# Patient Record
Sex: MALE | Race: WHITE | NOT HISPANIC OR LATINO | Employment: OTHER | URBAN - METROPOLITAN AREA
[De-identification: names, ages, dates, MRNs, and addresses within clinical notes are randomized per-mention and may not be internally consistent; named-entity substitution may affect disease eponyms.]

---

## 2023-02-27 LAB
ANION GAP IN SER/PLAS: 14 MMOL/L (ref 10–20)
CALCIUM (MG/DL) IN SER/PLAS: 9.9 MG/DL (ref 8.6–10.3)
CARBON DIOXIDE, TOTAL (MMOL/L) IN SER/PLAS: 28 MMOL/L (ref 21–32)
CHLORIDE (MMOL/L) IN SER/PLAS: 104 MMOL/L (ref 98–107)
CREATININE (MG/DL) IN SER/PLAS: 0.92 MG/DL (ref 0.5–1.3)
ERYTHROCYTE DISTRIBUTION WIDTH (RATIO) BY AUTOMATED COUNT: 11.9 % (ref 11.5–14.5)
ERYTHROCYTE MEAN CORPUSCULAR HEMOGLOBIN CONCENTRATION (G/DL) BY AUTOMATED: 32.7 G/DL (ref 32–36)
ERYTHROCYTE MEAN CORPUSCULAR VOLUME (FL) BY AUTOMATED COUNT: 91 FL (ref 80–100)
ERYTHROCYTES (10*6/UL) IN BLOOD BY AUTOMATED COUNT: 4.66 X10E12/L (ref 4.5–5.9)
GFR MALE: >90 ML/MIN/1.73M2
GLUCOSE (MG/DL) IN SER/PLAS: 93 MG/DL (ref 74–99)
HEMATOCRIT (%) IN BLOOD BY AUTOMATED COUNT: 42.5 % (ref 41–52)
HEMOGLOBIN (G/DL) IN BLOOD: 13.9 G/DL (ref 13.5–17.5)
LEUKOCYTES (10*3/UL) IN BLOOD BY AUTOMATED COUNT: 7.8 X10E9/L (ref 4.4–11.3)
PLATELETS (10*3/UL) IN BLOOD AUTOMATED COUNT: 214 X10E9/L (ref 150–450)
POTASSIUM (MMOL/L) IN SER/PLAS: 4.5 MMOL/L (ref 3.5–5.3)
SODIUM (MMOL/L) IN SER/PLAS: 141 MMOL/L (ref 136–145)
UREA NITROGEN (MG/DL) IN SER/PLAS: 17 MG/DL (ref 6–23)

## 2023-04-11 ENCOUNTER — TELEPHONE (OUTPATIENT)
Dept: PRIMARY CARE | Facility: CLINIC | Age: 68
End: 2023-04-11
Payer: MEDICARE

## 2023-05-02 LAB
ALBUMIN (G/DL) IN SER/PLAS: 4.7 G/DL (ref 3.4–5)
ALBUMIN (MG/L) IN URINE: 12.1 MG/L
ALBUMIN/CREATININE (UG/MG) IN URINE: 7.3 UG/MG CRT (ref 0–30)
ANION GAP IN SER/PLAS: 12 MMOL/L (ref 10–20)
CALCIUM (MG/DL) IN SER/PLAS: 9.8 MG/DL (ref 8.6–10.3)
CARBON DIOXIDE, TOTAL (MMOL/L) IN SER/PLAS: 29 MMOL/L (ref 21–32)
CHLORIDE (MMOL/L) IN SER/PLAS: 104 MMOL/L (ref 98–107)
CREATININE (MG/DL) IN SER/PLAS: 0.96 MG/DL (ref 0.5–1.3)
CREATININE (MG/DL) IN URINE: 165 MG/DL (ref 20–370)
ERYTHROCYTE DISTRIBUTION WIDTH (RATIO) BY AUTOMATED COUNT: 11.9 % (ref 11.5–14.5)
ERYTHROCYTE MEAN CORPUSCULAR HEMOGLOBIN CONCENTRATION (G/DL) BY AUTOMATED: 32.9 G/DL (ref 32–36)
ERYTHROCYTE MEAN CORPUSCULAR VOLUME (FL) BY AUTOMATED COUNT: 92 FL (ref 80–100)
ERYTHROCYTES (10*6/UL) IN BLOOD BY AUTOMATED COUNT: 4.56 X10E12/L (ref 4.5–5.9)
GFR MALE: 87 ML/MIN/1.73M2
GLUCOSE (MG/DL) IN SER/PLAS: 81 MG/DL (ref 74–99)
HEMATOCRIT (%) IN BLOOD BY AUTOMATED COUNT: 42 % (ref 41–52)
HEMOGLOBIN (G/DL) IN BLOOD: 13.8 G/DL (ref 13.5–17.5)
LEUKOCYTES (10*3/UL) IN BLOOD BY AUTOMATED COUNT: 7 X10E9/L (ref 4.4–11.3)
PHOSPHATE (MG/DL) IN SER/PLAS: 3.5 MG/DL (ref 2.5–4.9)
PLATELETS (10*3/UL) IN BLOOD AUTOMATED COUNT: 192 X10E9/L (ref 150–450)
POTASSIUM (MMOL/L) IN SER/PLAS: 4.4 MMOL/L (ref 3.5–5.3)
SODIUM (MMOL/L) IN SER/PLAS: 141 MMOL/L (ref 136–145)
UREA NITROGEN (MG/DL) IN SER/PLAS: 18 MG/DL (ref 6–23)

## 2023-10-18 ENCOUNTER — HOSPITAL ENCOUNTER (OUTPATIENT)
Dept: RADIOLOGY | Facility: HOSPITAL | Age: 68
Discharge: HOME | End: 2023-10-18
Payer: MEDICARE

## 2023-10-18 DIAGNOSIS — N28.1 CYST OF KIDNEY, ACQUIRED: ICD-10-CM

## 2023-10-18 PROCEDURE — 76770 US EXAM ABDO BACK WALL COMP: CPT

## 2023-10-18 PROCEDURE — 76770 US EXAM ABDO BACK WALL COMP: CPT | Performed by: RADIOLOGY

## 2023-10-25 ENCOUNTER — LAB (OUTPATIENT)
Dept: LAB | Facility: LAB | Age: 68
End: 2023-10-25
Payer: MEDICARE

## 2023-10-25 ENCOUNTER — OFFICE VISIT (OUTPATIENT)
Dept: PRIMARY CARE | Facility: CLINIC | Age: 68
End: 2023-10-25
Payer: MEDICARE

## 2023-10-25 VITALS
WEIGHT: 206 LBS | RESPIRATION RATE: 17 BRPM | HEART RATE: 67 BPM | SYSTOLIC BLOOD PRESSURE: 122 MMHG | BODY MASS INDEX: 33.11 KG/M2 | TEMPERATURE: 97.8 F | DIASTOLIC BLOOD PRESSURE: 82 MMHG | HEIGHT: 66 IN

## 2023-10-25 DIAGNOSIS — Z23 NEED FOR VACCINATION: ICD-10-CM

## 2023-10-25 DIAGNOSIS — Z00.00 MEDICARE ANNUAL WELLNESS VISIT, SUBSEQUENT: ICD-10-CM

## 2023-10-25 DIAGNOSIS — I25.10 CORONARY ARTERY DISEASE INVOLVING NATIVE CORONARY ARTERY OF NATIVE HEART, UNSPECIFIED WHETHER ANGINA PRESENT: ICD-10-CM

## 2023-10-25 DIAGNOSIS — N40.1 BPH ASSOCIATED WITH NOCTURIA: ICD-10-CM

## 2023-10-25 DIAGNOSIS — G47.33 OBSTRUCTIVE SLEEP APNEA: ICD-10-CM

## 2023-10-25 DIAGNOSIS — K21.9 GERD WITHOUT ESOPHAGITIS: ICD-10-CM

## 2023-10-25 DIAGNOSIS — R35.1 BPH ASSOCIATED WITH NOCTURIA: ICD-10-CM

## 2023-10-25 DIAGNOSIS — L40.9 PSORIASIS: ICD-10-CM

## 2023-10-25 DIAGNOSIS — R53.83 FATIGUE, UNSPECIFIED TYPE: ICD-10-CM

## 2023-10-25 DIAGNOSIS — Z00.00 MEDICARE ANNUAL WELLNESS VISIT, SUBSEQUENT: Primary | ICD-10-CM

## 2023-10-25 DIAGNOSIS — R73.9 HYPERGLYCEMIA: ICD-10-CM

## 2023-10-25 PROBLEM — L82.1 OTHER SEBORRHEIC KERATOSIS: Status: ACTIVE | Noted: 2022-03-22

## 2023-10-25 PROBLEM — L57.0 ACTINIC KERATOSIS: Status: ACTIVE | Noted: 2023-10-25

## 2023-10-25 PROBLEM — E66.9 OBESITY, CLASS I, BMI 30-34.9: Status: ACTIVE | Noted: 2018-06-01

## 2023-10-25 PROBLEM — R09.89 CHRONIC THROAT CLEARING: Status: ACTIVE | Noted: 2023-10-25

## 2023-10-25 PROBLEM — R93.1 ABNORMAL COMPUTED TOMOGRAPHY ANGIOGRAPHY OF HEART: Status: ACTIVE | Noted: 2023-10-25

## 2023-10-25 PROBLEM — J31.0 CHRONIC RHINITIS: Status: ACTIVE | Noted: 2023-10-25

## 2023-10-25 PROBLEM — R07.9 CHEST PAIN: Status: ACTIVE | Noted: 2023-10-25

## 2023-10-25 PROBLEM — L57.3 POIKILODERMA OF CIVATTE: Status: ACTIVE | Noted: 2022-03-22

## 2023-10-25 PROBLEM — R25.1 OCCASIONAL TREMORS: Status: ACTIVE | Noted: 2023-10-25

## 2023-10-25 PROBLEM — E78.5 HYPERLIPIDEMIA: Status: ACTIVE | Noted: 2023-10-25

## 2023-10-25 PROBLEM — G44.009 CLUSTER HEADACHES: Status: ACTIVE | Noted: 2023-10-25

## 2023-10-25 PROBLEM — N45.1 EPIDIDYMITIS: Status: ACTIVE | Noted: 2023-10-25

## 2023-10-25 PROBLEM — L72.0 EPIDERMAL CYST: Status: ACTIVE | Noted: 2022-03-22

## 2023-10-25 PROBLEM — E66.811 OBESITY, CLASS I, BMI 30-34.9: Status: ACTIVE | Noted: 2018-06-01

## 2023-10-25 PROBLEM — D48.5 NEOPLASM OF UNCERTAIN BEHAVIOR OF SKIN: Status: ACTIVE | Noted: 2022-03-22

## 2023-10-25 PROBLEM — L72.3 INFECTED SEBACEOUS CYST: Status: ACTIVE | Noted: 2023-10-25

## 2023-10-25 PROBLEM — N28.1 RENAL CYST, RIGHT: Status: ACTIVE | Noted: 2023-10-25

## 2023-10-25 PROBLEM — R35.81 NOCTURNAL POLYURIA: Status: ACTIVE | Noted: 2023-10-25

## 2023-10-25 PROBLEM — K63.5 COLON POLYPS: Status: ACTIVE | Noted: 2023-10-25

## 2023-10-25 PROBLEM — D18.01 HEMANGIOMA OF SKIN AND SUBCUTANEOUS TISSUE: Status: ACTIVE | Noted: 2022-03-22

## 2023-10-25 PROBLEM — R21 RASH AND OTHER NONSPECIFIC SKIN ERUPTION: Status: ACTIVE | Noted: 2022-03-22

## 2023-10-25 PROBLEM — B07.8 OTHER VIRAL WARTS: Status: ACTIVE | Noted: 2022-03-22

## 2023-10-25 PROBLEM — R10.31 RIGHT GROIN PAIN: Status: ACTIVE | Noted: 2023-10-25

## 2023-10-25 PROBLEM — L91.8 OTHER HYPERTROPHIC DISORDERS OF THE SKIN: Status: ACTIVE | Noted: 2022-03-22

## 2023-10-25 PROBLEM — R35.0 URINARY FREQUENCY: Status: ACTIVE | Noted: 2023-10-25

## 2023-10-25 PROBLEM — L08.9 INFECTED SEBACEOUS CYST: Status: ACTIVE | Noted: 2023-10-25

## 2023-10-25 PROBLEM — I10 BENIGN ESSENTIAL HYPERTENSION: Status: ACTIVE | Noted: 2023-10-25

## 2023-10-25 PROBLEM — M19.90 ARTHRITIS: Status: ACTIVE | Noted: 2023-10-25

## 2023-10-25 LAB
ALBUMIN SERPL BCP-MCNC: 4.3 G/DL (ref 3.4–5)
ALP SERPL-CCNC: 58 U/L (ref 33–136)
ALT SERPL W P-5'-P-CCNC: 38 U/L (ref 10–52)
ANION GAP SERPL CALC-SCNC: 11 MMOL/L (ref 10–20)
AST SERPL W P-5'-P-CCNC: 24 U/L (ref 9–39)
BASOPHILS # BLD AUTO: 0.07 X10*3/UL (ref 0–0.1)
BASOPHILS NFR BLD AUTO: 1.2 %
BILIRUB SERPL-MCNC: 1.2 MG/DL (ref 0–1.2)
BUN SERPL-MCNC: 16 MG/DL (ref 6–23)
CALCIUM SERPL-MCNC: 9 MG/DL (ref 8.6–10.3)
CHLORIDE SERPL-SCNC: 106 MMOL/L (ref 98–107)
CHOLEST SERPL-MCNC: 106 MG/DL (ref 0–199)
CHOLESTEROL/HDL RATIO: 2.7
CO2 SERPL-SCNC: 30 MMOL/L (ref 21–32)
CREAT SERPL-MCNC: 0.88 MG/DL (ref 0.5–1.3)
EOSINOPHIL # BLD AUTO: 0.24 X10*3/UL (ref 0–0.7)
EOSINOPHIL NFR BLD AUTO: 4.3 %
ERYTHROCYTE [DISTWIDTH] IN BLOOD BY AUTOMATED COUNT: 11.9 % (ref 11.5–14.5)
EST. AVERAGE GLUCOSE BLD GHB EST-MCNC: 103 MG/DL
GFR SERPL CREATININE-BSD FRML MDRD: >90 ML/MIN/1.73M*2
GLUCOSE SERPL-MCNC: 100 MG/DL (ref 74–99)
HBA1C MFR BLD: 5.2 %
HCT VFR BLD AUTO: 43.9 % (ref 41–52)
HDLC SERPL-MCNC: 40 MG/DL
HGB BLD-MCNC: 14.1 G/DL (ref 13.5–17.5)
IMM GRANULOCYTES # BLD AUTO: 0.02 X10*3/UL (ref 0–0.7)
IMM GRANULOCYTES NFR BLD AUTO: 0.4 % (ref 0–0.9)
LDLC SERPL CALC-MCNC: 55 MG/DL
LYMPHOCYTES # BLD AUTO: 1.3 X10*3/UL (ref 1.2–4.8)
LYMPHOCYTES NFR BLD AUTO: 23.2 %
MCH RBC QN AUTO: 29.4 PG (ref 26–34)
MCHC RBC AUTO-ENTMCNC: 32.1 G/DL (ref 32–36)
MCV RBC AUTO: 92 FL (ref 80–100)
MONOCYTES # BLD AUTO: 0.41 X10*3/UL (ref 0.1–1)
MONOCYTES NFR BLD AUTO: 7.3 %
NEUTROPHILS # BLD AUTO: 3.57 X10*3/UL (ref 1.2–7.7)
NEUTROPHILS NFR BLD AUTO: 63.6 %
NON HDL CHOLESTEROL: 66 MG/DL (ref 0–149)
NRBC BLD-RTO: 0 /100 WBCS (ref 0–0)
PLATELET # BLD AUTO: 179 X10*3/UL (ref 150–450)
PMV BLD AUTO: 9.6 FL (ref 7.5–11.5)
POTASSIUM SERPL-SCNC: 4.5 MMOL/L (ref 3.5–5.3)
PROT SERPL-MCNC: 6.4 G/DL (ref 6.4–8.2)
PSA SERPL-MCNC: 0.61 NG/ML
RBC # BLD AUTO: 4.79 X10*6/UL (ref 4.5–5.9)
SODIUM SERPL-SCNC: 142 MMOL/L (ref 136–145)
TRIGL SERPL-MCNC: 56 MG/DL (ref 0–149)
TSH SERPL-ACNC: 2.16 MIU/L (ref 0.44–3.98)
VLDL: 11 MG/DL (ref 0–40)
WBC # BLD AUTO: 5.6 X10*3/UL (ref 4.4–11.3)

## 2023-10-25 PROCEDURE — 84153 ASSAY OF PSA TOTAL: CPT

## 2023-10-25 PROCEDURE — 36415 COLL VENOUS BLD VENIPUNCTURE: CPT

## 2023-10-25 PROCEDURE — 83036 HEMOGLOBIN GLYCOSYLATED A1C: CPT

## 2023-10-25 PROCEDURE — 84443 ASSAY THYROID STIM HORMONE: CPT

## 2023-10-25 PROCEDURE — 80061 LIPID PANEL: CPT

## 2023-10-25 PROCEDURE — G0439 PPPS, SUBSEQ VISIT: HCPCS | Performed by: FAMILY MEDICINE

## 2023-10-25 PROCEDURE — 85025 COMPLETE CBC W/AUTO DIFF WBC: CPT

## 2023-10-25 PROCEDURE — G0009 ADMIN PNEUMOCOCCAL VACCINE: HCPCS | Performed by: FAMILY MEDICINE

## 2023-10-25 PROCEDURE — 90677 PCV20 VACCINE IM: CPT | Performed by: FAMILY MEDICINE

## 2023-10-25 PROCEDURE — 80053 COMPREHEN METABOLIC PANEL: CPT

## 2023-10-25 RX ORDER — ISOSORBIDE MONONITRATE 30 MG/1
30 TABLET, EXTENDED RELEASE ORAL EVERY 24 HOURS
Qty: 90 TABLET | Refills: 3 | Status: SHIPPED | OUTPATIENT
Start: 2023-10-25

## 2023-10-25 RX ORDER — AMLODIPINE BESYLATE 5 MG/1
5 TABLET ORAL DAILY
Qty: 90 TABLET | Refills: 3 | Status: SHIPPED | OUTPATIENT
Start: 2023-10-25

## 2023-10-25 RX ORDER — AMLODIPINE BESYLATE 5 MG/1
5 TABLET ORAL DAILY
COMMUNITY
Start: 2023-02-01 | End: 2023-10-25 | Stop reason: SDUPTHER

## 2023-10-25 RX ORDER — MOMETASONE FUROATE 1 MG/G
OINTMENT TOPICAL DAILY
Qty: 45 G | Refills: 2 | Status: SHIPPED | OUTPATIENT
Start: 2023-10-25 | End: 2024-10-24

## 2023-10-25 RX ORDER — ISOSORBIDE MONONITRATE 30 MG/1
TABLET, EXTENDED RELEASE ORAL EVERY 24 HOURS
COMMUNITY
Start: 2023-02-27 | End: 2023-10-25 | Stop reason: SDUPTHER

## 2023-10-25 RX ORDER — ATORVASTATIN CALCIUM 40 MG/1
TABLET, FILM COATED ORAL EVERY 24 HOURS
COMMUNITY
Start: 2023-02-01 | End: 2023-10-25 | Stop reason: SDUPTHER

## 2023-10-25 RX ORDER — NAPROXEN SODIUM 220 MG/1
1 TABLET, FILM COATED ORAL DAILY
COMMUNITY
Start: 2023-01-30 | End: 2023-10-25 | Stop reason: SDUPTHER

## 2023-10-25 RX ORDER — NITROGLYCERIN 0.4 MG/1
TABLET SUBLINGUAL
COMMUNITY
Start: 2023-01-30

## 2023-10-25 RX ORDER — ATORVASTATIN CALCIUM 40 MG/1
40 TABLET, FILM COATED ORAL DAILY
Qty: 90 TABLET | Refills: 3 | Status: SHIPPED | OUTPATIENT
Start: 2023-10-25

## 2023-10-25 RX ORDER — NAPROXEN SODIUM 220 MG/1
81 TABLET, FILM COATED ORAL DAILY
Qty: 90 TABLET | Refills: 3 | Status: SHIPPED | OUTPATIENT
Start: 2023-10-25

## 2023-10-25 ASSESSMENT — ACTIVITIES OF DAILY LIVING (ADL)
DRESSING: INDEPENDENT
TAKING_MEDICATION: INDEPENDENT
BATHING: INDEPENDENT
MANAGING_FINANCES: INDEPENDENT
DOING_HOUSEWORK: INDEPENDENT
GROCERY_SHOPPING: INDEPENDENT

## 2023-10-25 ASSESSMENT — ENCOUNTER SYMPTOMS
MUSCULOSKELETAL NEGATIVE: 1
ALLERGIC/IMMUNOLOGIC NEGATIVE: 1
ENDOCRINE NEGATIVE: 1
CONSTITUTIONAL NEGATIVE: 1
CARDIOVASCULAR NEGATIVE: 1
NEUROLOGICAL NEGATIVE: 1
PSYCHIATRIC NEGATIVE: 1
GASTROINTESTINAL NEGATIVE: 1
RESPIRATORY NEGATIVE: 1
HEMATOLOGIC/LYMPHATIC NEGATIVE: 1
EYES NEGATIVE: 1

## 2023-10-25 ASSESSMENT — PATIENT HEALTH QUESTIONNAIRE - PHQ9
1. LITTLE INTEREST OR PLEASURE IN DOING THINGS: NOT AT ALL
2. FEELING DOWN, DEPRESSED OR HOPELESS: NOT AT ALL
SUM OF ALL RESPONSES TO PHQ9 QUESTIONS 1 AND 2: 0

## 2023-10-25 NOTE — PROGRESS NOTES
"Subjective   Reason for Visit: Demetrio Malave is an 67 y.o. male here for a Medicare Wellness visit.     Past Medical, Surgical, and Family History reviewed and updated in chart.    Reviewed all medications by prescribing practitioner or clinical pharmacist (such as prescriptions, OTCs, herbal therapies and supplements) and documented in the medical record.    HPI    Patient Care Team:  Rizwan Norris DO as PCP - General  Darrell Durand DO as PCP - [facility] [place] Attributed Provider     Review of Systems   Constitutional: Negative.    HENT: Negative.     Eyes: Negative.    Respiratory: Negative.     Cardiovascular: Negative.    Gastrointestinal: Negative.    Endocrine: Negative.    Genitourinary: Negative.    Musculoskeletal: Negative.    Skin: Negative.    Allergic/Immunologic: Negative.    Neurological: Negative.    Hematological: Negative.    Psychiatric/Behavioral: Negative.         Objective   Vitals:  /82   Pulse 67   Temp 36.6 °C (97.8 °F)   Resp 17   Ht 1.676 m (5' 6\")   Wt 93.4 kg (206 lb)   BMI 33.25 kg/m²       Physical Exam  Vitals and nursing note reviewed.   Constitutional:       Appearance: Normal appearance.   HENT:      Head: Normocephalic and atraumatic.      Nose: Nose normal.      Mouth/Throat:      Pharynx: Oropharynx is clear.   Eyes:      Extraocular Movements: Extraocular movements intact.      Conjunctiva/sclera: Conjunctivae normal.      Pupils: Pupils are equal, round, and reactive to light.   Neck:      Vascular: No carotid bruit.   Cardiovascular:      Rate and Rhythm: Normal rate and regular rhythm.      Pulses: Normal pulses.      Heart sounds: Normal heart sounds.   Pulmonary:      Effort: Pulmonary effort is normal. No respiratory distress.      Breath sounds: Normal breath sounds. No wheezing, rhonchi or rales.   Abdominal:      General: Abdomen is flat. Bowel sounds are normal. There is no distension.      Palpations: Abdomen is soft.      Tenderness: There is no " abdominal tenderness.      Hernia: No hernia is present.   Musculoskeletal:         General: No swelling or tenderness. Normal range of motion.      Cervical back: Normal range of motion and neck supple. No tenderness.   Lymphadenopathy:      Cervical: No cervical adenopathy.   Skin:     General: Skin is warm and dry.      Capillary Refill: Capillary refill takes less than 2 seconds.   Neurological:      General: No focal deficit present.      Mental Status: He is alert and oriented to person, place, and time.      Cranial Nerves: No cranial nerve deficit.   Psychiatric:         Attention and Perception: Attention and perception normal.         Mood and Affect: Mood normal.         Behavior: Behavior normal.         Thought Content: Thought content normal.         Judgment: Judgment normal.         Assessment/Plan   Problem List Items Addressed This Visit       BPH associated with nocturia    Relevant Orders    CBC and Auto Differential (Completed)    Comprehensive Metabolic Panel    Hemoglobin A1C    Lipid Panel    Prostate Specific Antigen    TSH with reflex to Free T4 if abnormal    CAD (coronary artery disease)    Relevant Medications    amLODIPine (Norvasc) 5 mg tablet    isosorbide mononitrate ER (Imdur) 30 mg 24 hr tablet    nitroglycerin (Nitrostat) 0.4 mg SL tablet    Other Relevant Orders    CBC and Auto Differential (Completed)    Comprehensive Metabolic Panel    Hemoglobin A1C    Lipid Panel    Prostate Specific Antigen    TSH with reflex to Free T4 if abnormal    GERD without esophagitis    Relevant Orders    CBC and Auto Differential (Completed)    Comprehensive Metabolic Panel    Hemoglobin A1C    Lipid Panel    Prostate Specific Antigen    TSH with reflex to Free T4 if abnormal    Obstructive sleep apnea    Relevant Orders    CBC and Auto Differential (Completed)    Comprehensive Metabolic Panel    Hemoglobin A1C    Lipid Panel    Prostate Specific Antigen    TSH with reflex to Free T4 if abnormal      Other Visit Diagnoses       Medicare annual wellness visit, subsequent    -  Primary    Relevant Orders    CBC and Auto Differential (Completed)    Comprehensive Metabolic Panel    Hemoglobin A1C    Lipid Panel    Prostate Specific Antigen    TSH with reflex to Free T4 if abnormal    Need for vaccination        Relevant Orders    Pneumococcal conjugate vaccine, 20-valent (PREVNAR 20) (Completed)    CBC and Auto Differential (Completed)    Comprehensive Metabolic Panel    Hemoglobin A1C    Lipid Panel    Prostate Specific Antigen    TSH with reflex to Free T4 if abnormal    Hyperglycemia        Relevant Orders    Hemoglobin A1C    Fatigue, unspecified type        Relevant Orders    TSH with reflex to Free T4 if abnormal    Psoriasis        Relevant Medications    mometasone (Elocon) 0.1 % ointment

## 2024-01-02 ENCOUNTER — PATIENT MESSAGE (OUTPATIENT)
Dept: PRIMARY CARE | Facility: CLINIC | Age: 69
End: 2024-01-02
Payer: MEDICARE

## 2024-01-02 DIAGNOSIS — R25.1 TREMOR: Primary | ICD-10-CM

## 2024-01-02 NOTE — TELEPHONE ENCOUNTER
From: Demetrio Malave  To: Rizwan Norris,   Sent: 1/2/2024 12:15 PM EST  Subject: Tremors    Dr. Norris hope all is well. Happy New Year.    When I saw you in October we talked about my tremors. I think i should get things checked out further. When I try to clip my finger nails it is difficult to keep steady. Also when i am trying to cut vegetables up to cook.     I was wondering if you could recommend a Neurologist for me one that specializes in tremors. I really do not want to go out of the area. I don’t mind going to [facility] ([place]) or [place] or even [facility]. Or [place].  I really would like to stay in this area.    Thank You Senthil Malave

## 2024-02-02 ENCOUNTER — APPOINTMENT (OUTPATIENT)
Dept: RADIOLOGY | Facility: HOSPITAL | Age: 69
End: 2024-02-02
Payer: MEDICARE

## 2024-02-02 ENCOUNTER — HOSPITAL ENCOUNTER (EMERGENCY)
Facility: HOSPITAL | Age: 69
Discharge: HOME | End: 2024-02-02
Attending: EMERGENCY MEDICINE
Payer: MEDICARE

## 2024-02-02 ENCOUNTER — APPOINTMENT (OUTPATIENT)
Dept: CARDIOLOGY | Facility: HOSPITAL | Age: 69
End: 2024-02-02
Payer: MEDICARE

## 2024-02-02 VITALS
WEIGHT: 200 LBS | BODY MASS INDEX: 32.14 KG/M2 | OXYGEN SATURATION: 99 % | RESPIRATION RATE: 17 BRPM | HEART RATE: 71 BPM | TEMPERATURE: 97.7 F | DIASTOLIC BLOOD PRESSURE: 68 MMHG | SYSTOLIC BLOOD PRESSURE: 109 MMHG | HEIGHT: 66 IN

## 2024-02-02 DIAGNOSIS — N20.1 URETERAL STONE: Primary | ICD-10-CM

## 2024-02-02 LAB
ALBUMIN SERPL BCP-MCNC: 4.9 G/DL (ref 3.4–5)
ALP SERPL-CCNC: 68 U/L (ref 33–136)
ALT SERPL W P-5'-P-CCNC: 60 U/L (ref 10–52)
ANION GAP SERPL CALC-SCNC: 14 MMOL/L (ref 10–20)
APPEARANCE UR: CLEAR
AST SERPL W P-5'-P-CCNC: 36 U/L (ref 9–39)
ATRIAL RATE: 81 BPM
BASOPHILS # BLD AUTO: 0.08 X10*3/UL (ref 0–0.1)
BASOPHILS NFR BLD AUTO: 1.1 %
BILIRUB SERPL-MCNC: 1.7 MG/DL (ref 0–1.2)
BILIRUB UR STRIP.AUTO-MCNC: NEGATIVE MG/DL
BNP SERPL-MCNC: 25 PG/ML (ref 0–99)
BUN SERPL-MCNC: 14 MG/DL (ref 6–23)
CALCIUM SERPL-MCNC: 9.8 MG/DL (ref 8.6–10.3)
CARDIAC TROPONIN I PNL SERPL HS: 5 NG/L (ref 0–20)
CARDIAC TROPONIN I PNL SERPL HS: 5 NG/L (ref 0–20)
CHLORIDE SERPL-SCNC: 104 MMOL/L (ref 98–107)
CO2 SERPL-SCNC: 27 MMOL/L (ref 21–32)
COLOR UR: NORMAL
CREAT SERPL-MCNC: 0.87 MG/DL (ref 0.5–1.3)
EGFRCR SERPLBLD CKD-EPI 2021: >90 ML/MIN/1.73M*2
EOSINOPHIL # BLD AUTO: 0.27 X10*3/UL (ref 0–0.7)
EOSINOPHIL NFR BLD AUTO: 3.8 %
ERYTHROCYTE [DISTWIDTH] IN BLOOD BY AUTOMATED COUNT: 11.9 % (ref 11.5–14.5)
GLUCOSE SERPL-MCNC: 95 MG/DL (ref 74–99)
GLUCOSE UR STRIP.AUTO-MCNC: NEGATIVE MG/DL
HCT VFR BLD AUTO: 45.6 % (ref 41–52)
HGB BLD-MCNC: 15.4 G/DL (ref 13.5–17.5)
HOLD SPECIMEN: NORMAL
IMM GRANULOCYTES # BLD AUTO: 0.02 X10*3/UL (ref 0–0.7)
IMM GRANULOCYTES NFR BLD AUTO: 0.3 % (ref 0–0.9)
KETONES UR STRIP.AUTO-MCNC: NEGATIVE MG/DL
LEUKOCYTE ESTERASE UR QL STRIP.AUTO: NEGATIVE
LIPASE SERPL-CCNC: 20 U/L (ref 9–82)
LYMPHOCYTES # BLD AUTO: 1.42 X10*3/UL (ref 1.2–4.8)
LYMPHOCYTES NFR BLD AUTO: 20.2 %
MAGNESIUM SERPL-MCNC: 2.15 MG/DL (ref 1.6–2.4)
MCH RBC QN AUTO: 30 PG (ref 26–34)
MCHC RBC AUTO-ENTMCNC: 33.8 G/DL (ref 32–36)
MCV RBC AUTO: 89 FL (ref 80–100)
MONOCYTES # BLD AUTO: 0.51 X10*3/UL (ref 0.1–1)
MONOCYTES NFR BLD AUTO: 7.3 %
NEUTROPHILS # BLD AUTO: 4.73 X10*3/UL (ref 1.2–7.7)
NEUTROPHILS NFR BLD AUTO: 67.3 %
NITRITE UR QL STRIP.AUTO: NEGATIVE
NRBC BLD-RTO: 0 /100 WBCS (ref 0–0)
P AXIS: 50 DEGREES
P OFFSET: 179 MS
P ONSET: 124 MS
PH UR STRIP.AUTO: 7 [PH]
PLATELET # BLD AUTO: 199 X10*3/UL (ref 150–450)
POTASSIUM SERPL-SCNC: 3.9 MMOL/L (ref 3.5–5.3)
PR INTERVAL: 172 MS
PROT SERPL-MCNC: 7.3 G/DL (ref 6.4–8.2)
PROT UR STRIP.AUTO-MCNC: NEGATIVE MG/DL
Q ONSET: 210 MS
QRS COUNT: 13 BEATS
QRS DURATION: 94 MS
QT INTERVAL: 370 MS
QTC CALCULATION(BAZETT): 429 MS
QTC FREDERICIA: 408 MS
R AXIS: -52 DEGREES
RBC # BLD AUTO: 5.13 X10*6/UL (ref 4.5–5.9)
RBC # UR STRIP.AUTO: NEGATIVE /UL
SODIUM SERPL-SCNC: 141 MMOL/L (ref 136–145)
SP GR UR STRIP.AUTO: 1.01
T AXIS: 44 DEGREES
T OFFSET: 395 MS
UROBILINOGEN UR STRIP.AUTO-MCNC: <2 MG/DL
VENTRICULAR RATE: 81 BPM
WBC # BLD AUTO: 7 X10*3/UL (ref 4.4–11.3)

## 2024-02-02 PROCEDURE — 83690 ASSAY OF LIPASE: CPT | Performed by: EMERGENCY MEDICINE

## 2024-02-02 PROCEDURE — 84484 ASSAY OF TROPONIN QUANT: CPT | Performed by: EMERGENCY MEDICINE

## 2024-02-02 PROCEDURE — 76870 US EXAM SCROTUM: CPT | Mod: FOREIGN READ | Performed by: RADIOLOGY

## 2024-02-02 PROCEDURE — 2500000004 HC RX 250 GENERAL PHARMACY W/ HCPCS (ALT 636 FOR OP/ED): Performed by: PHYSICIAN ASSISTANT

## 2024-02-02 PROCEDURE — 81003 URINALYSIS AUTO W/O SCOPE: CPT | Performed by: PHYSICIAN ASSISTANT

## 2024-02-02 PROCEDURE — 36415 COLL VENOUS BLD VENIPUNCTURE: CPT | Performed by: EMERGENCY MEDICINE

## 2024-02-02 PROCEDURE — 96375 TX/PRO/DX INJ NEW DRUG ADDON: CPT

## 2024-02-02 PROCEDURE — 84075 ASSAY ALKALINE PHOSPHATASE: CPT | Performed by: EMERGENCY MEDICINE

## 2024-02-02 PROCEDURE — 71045 X-RAY EXAM CHEST 1 VIEW: CPT | Mod: FR

## 2024-02-02 PROCEDURE — 83880 ASSAY OF NATRIURETIC PEPTIDE: CPT | Performed by: EMERGENCY MEDICINE

## 2024-02-02 PROCEDURE — 99285 EMERGENCY DEPT VISIT HI MDM: CPT | Performed by: EMERGENCY MEDICINE

## 2024-02-02 PROCEDURE — 74176 CT ABD & PELVIS W/O CONTRAST: CPT | Mod: FOREIGN READ | Performed by: RADIOLOGY

## 2024-02-02 PROCEDURE — 2500000001 HC RX 250 WO HCPCS SELF ADMINISTERED DRUGS (ALT 637 FOR MEDICARE OP): Performed by: PHYSICIAN ASSISTANT

## 2024-02-02 PROCEDURE — 83735 ASSAY OF MAGNESIUM: CPT | Performed by: EMERGENCY MEDICINE

## 2024-02-02 PROCEDURE — 96374 THER/PROPH/DIAG INJ IV PUSH: CPT

## 2024-02-02 PROCEDURE — 93005 ELECTROCARDIOGRAM TRACING: CPT

## 2024-02-02 PROCEDURE — 74176 CT ABD & PELVIS W/O CONTRAST: CPT

## 2024-02-02 PROCEDURE — 85025 COMPLETE CBC W/AUTO DIFF WBC: CPT | Performed by: EMERGENCY MEDICINE

## 2024-02-02 PROCEDURE — 76870 US EXAM SCROTUM: CPT

## 2024-02-02 PROCEDURE — 71045 X-RAY EXAM CHEST 1 VIEW: CPT | Mod: FOREIGN READ | Performed by: RADIOLOGY

## 2024-02-02 RX ORDER — NALOXONE HYDROCHLORIDE 4 MG/.1ML
4 SPRAY NASAL AS NEEDED
Qty: 2 EACH | Refills: 0 | Status: SHIPPED | OUTPATIENT
Start: 2024-02-02 | End: 2024-04-15 | Stop reason: WASHOUT

## 2024-02-02 RX ORDER — MORPHINE SULFATE 4 MG/ML
4 INJECTION, SOLUTION INTRAMUSCULAR; INTRAVENOUS ONCE
Status: COMPLETED | OUTPATIENT
Start: 2024-02-02 | End: 2024-02-02

## 2024-02-02 RX ORDER — OXYCODONE AND ACETAMINOPHEN 5; 325 MG/1; MG/1
1 TABLET ORAL EVERY 8 HOURS PRN
Qty: 10 TABLET | Refills: 0 | Status: SHIPPED | OUTPATIENT
Start: 2024-02-02 | End: 2024-02-05

## 2024-02-02 RX ORDER — ONDANSETRON HYDROCHLORIDE 2 MG/ML
4 INJECTION, SOLUTION INTRAVENOUS ONCE
Status: COMPLETED | OUTPATIENT
Start: 2024-02-02 | End: 2024-02-02

## 2024-02-02 RX ORDER — KETOROLAC TROMETHAMINE 10 MG/1
10 TABLET, FILM COATED ORAL EVERY 6 HOURS PRN
Qty: 20 TABLET | Refills: 0 | Status: SHIPPED | OUTPATIENT
Start: 2024-02-02 | End: 2024-02-07

## 2024-02-02 RX ORDER — ASPIRIN 325 MG
325 TABLET ORAL ONCE
Status: COMPLETED | OUTPATIENT
Start: 2024-02-02 | End: 2024-02-02

## 2024-02-02 RX ORDER — ONDANSETRON 4 MG/1
4 TABLET, ORALLY DISINTEGRATING ORAL EVERY 8 HOURS PRN
Qty: 15 TABLET | Refills: 0 | Status: SHIPPED | OUTPATIENT
Start: 2024-02-02 | End: 2024-02-07

## 2024-02-02 RX ORDER — TAMSULOSIN HYDROCHLORIDE 0.4 MG/1
0.4 CAPSULE ORAL DAILY
Qty: 7 CAPSULE | Refills: 0 | Status: SHIPPED | OUTPATIENT
Start: 2024-02-02 | End: 2024-02-09

## 2024-02-02 RX ADMIN — MORPHINE SULFATE 4 MG: 4 INJECTION, SOLUTION INTRAMUSCULAR; INTRAVENOUS at 11:48

## 2024-02-02 RX ADMIN — ONDANSETRON 4 MG: 2 INJECTION INTRAMUSCULAR; INTRAVENOUS at 11:46

## 2024-02-02 RX ADMIN — ASPIRIN 325 MG: 325 TABLET ORAL at 11:45

## 2024-02-02 ASSESSMENT — PAIN SCALES - GENERAL
PAINLEVEL_OUTOF10: 4
PAINLEVEL_OUTOF10: 0 - NO PAIN
PAINLEVEL_OUTOF10: 3
PAINLEVEL_OUTOF10: 6
PAINLEVEL_OUTOF10: 3

## 2024-02-02 ASSESSMENT — LIFESTYLE VARIABLES
HAVE PEOPLE ANNOYED YOU BY CRITICIZING YOUR DRINKING: NO
EVER FELT BAD OR GUILTY ABOUT YOUR DRINKING: NO
EVER HAD A DRINK FIRST THING IN THE MORNING TO STEADY YOUR NERVES TO GET RID OF A HANGOVER: NO
HAVE YOU EVER FELT YOU SHOULD CUT DOWN ON YOUR DRINKING: NO

## 2024-02-02 ASSESSMENT — PAIN - FUNCTIONAL ASSESSMENT
PAIN_FUNCTIONAL_ASSESSMENT: 0-10
PAIN_FUNCTIONAL_ASSESSMENT: 0-10

## 2024-02-02 ASSESSMENT — COLUMBIA-SUICIDE SEVERITY RATING SCALE - C-SSRS
2. HAVE YOU ACTUALLY HAD ANY THOUGHTS OF KILLING YOURSELF?: NO
1. IN THE PAST MONTH, HAVE YOU WISHED YOU WERE DEAD OR WISHED YOU COULD GO TO SLEEP AND NOT WAKE UP?: NO
6. HAVE YOU EVER DONE ANYTHING, STARTED TO DO ANYTHING, OR PREPARED TO DO ANYTHING TO END YOUR LIFE?: NO

## 2024-02-02 NOTE — ED PROVIDER NOTES
HPI   Chief Complaint   Patient presents with    Chest Pain     Pt c/o chest pain and right flank pain       A 68-year-old male patient with history of hypertension, hyperlipidemia comes into the emergency department today with complaints of right-sided flank pain that radiates down to his right testicle.  States this started couple days ago then he felt okay then started again yesterday evening.  States since that time he has developed left-sided chest pain and some tingling in the left arm.  States about a year ago he had a cardiac cath but he was not clear enough for stent.  Otherwise denies any other cardiac conditions.  Denies a history of kidney stones.  Denies any scrotal swelling, erythema.  For this purpose patient comes in the emergency department today for further evaluation.  He does describe urinary frequency, urgency and was up all night frequently having out of the bathroom.                          Lisa Coma Scale Score: 15                  Patient History   History reviewed. No pertinent past medical history.  Past Surgical History:   Procedure Laterality Date    CT ANGIO CORONARY ART WITH HEARTFLOW IF SCORE >30%  2/22/2023    CT HEART CORONARY ANGIOGRAM [facility]Y [id number] CT    HERNIA REPAIR  02/16/2017    Hernia Repair    OTHER SURGICAL HISTORY  12/17/2018    Shoulder surgery     No family history on file.  Social History     Tobacco Use    Smoking status: Former     Types: Cigarettes    Smokeless tobacco: Never   Substance Use Topics    Alcohol use: Not Currently    Drug use: Never       Physical Exam   ED Triage Vitals [02/02/24 1022]   Temperature Heart Rate Respirations BP   36.5 °C (97.7 °F) 81 16 146/87      Pulse Ox Temp src Heart Rate Source Patient Position   98 % -- Monitor --      BP Location FiO2 (%)     -- --       Physical Exam  Constitutional:       General: He is in acute distress (Moderate distress).      Appearance: Normal appearance. He is not ill-appearing or diaphoretic.   HENT:       Head: Normocephalic and atraumatic.      Nose: Nose normal.   Eyes:      Extraocular Movements: Extraocular movements intact.      Conjunctiva/sclera: Conjunctivae normal.      Pupils: Pupils are equal, round, and reactive to light.   Cardiovascular:      Rate and Rhythm: Normal rate and regular rhythm.   Pulmonary:      Effort: Pulmonary effort is normal. No respiratory distress.      Breath sounds: Normal breath sounds. No stridor. No wheezing.   Chest:      Chest wall: No deformity, tenderness or crepitus.   Abdominal:      Tenderness: There is abdominal tenderness (Tenderness palpation in the right side of the abdomen.). There is no guarding.   Genitourinary:     Comments: No scrotal swelling, edema, mild tenderness palpation of the right testicle, no inguinal hernia  Musculoskeletal:         General: Normal range of motion.      Cervical back: Normal range of motion.   Skin:     General: Skin is warm and dry.   Neurological:      General: No focal deficit present.      Mental Status: He is alert and oriented to person, place, and time. Mental status is at baseline.   Psychiatric:         Mood and Affect: Mood normal.         ED Course & MDM   Diagnoses as of 02/02/24 1326   Ureteral stone       Medical Decision Making  A 68-year-old male patient with history of hypertension, hyperlipidemia comes into the emergency department today with complaints of right-sided flank pain that radiates down to his right testicle.  States this started couple days ago then he felt okay then started again yesterday evening.  States since that time he has developed left-sided chest pain and some tingling in the left arm.  States about a year ago he had a cardiac cath but he was not clear enough for stent.  Otherwise denies any other cardiac conditions.  Denies a history of kidney stones.  Denies any scrotal swelling, erythema.  For this purpose patient comes in the emergency department today for further evaluation.  He does  describe urinary frequency, urgency and was up all night frequently having out of the bathroom.    EKG, chest x-ray, laboratory studies are ordered as well as ultrasound of the scrotum CT of the abdomen pelvis.  Rule ACS, arrhythmia, pneumothorax, pneumonia, acute kidney injury, UTI, pyelonephritis, kidney stone, testicular torsion or epididymitis.    Patient's urinalysis is negative, negative lipase negative BNP negative troponins negative EKG findings negative CMP other than a total bili of 1.7 ALT of 60.  Patient CBC is negative.  CT scan is consistent with a 2 mm stone at the left UVJ.  Patient's scrotal ultrasound is negative as well.  Will discharge patient home with follow-up with urology.  Patient agrees with this plan expressed full verbal understanding.  Questions answered.  Will send patient home p.o. pain medication, p.o. Flomax, p.o. Zofran.  Patient agrees this plan expressed full verbal understanding.  Questions answered.    Historians patient    Diagnosis: Ureteral stone        Labs Reviewed   COMPREHENSIVE METABOLIC PANEL - Abnormal       Result Value    Glucose 95      Sodium 141      Potassium 3.9      Chloride 104      Bicarbonate 27      Anion Gap 14      Urea Nitrogen 14      Creatinine 0.87      eGFR >90      Calcium 9.8      Albumin 4.9      Alkaline Phosphatase 68      Total Protein 7.3      AST 36      Bilirubin, Total 1.7 (*)     ALT 60 (*)    MAGNESIUM - Normal    Magnesium 2.15     B-TYPE NATRIURETIC PEPTIDE - Normal    BNP 25      Narrative:        <100 pg/mL - Heart failure unlikely  100-299 pg/mL - Intermediate probability of acute heart                  failure exacerbation. Correlate with clinical                  context and patient history.    >=300 pg/mL - Heart Failure likely. Correlate with clinical                  context and patient history.    BNP testing is performed using different testing methodology at [facility] than at other [facility]. Direct  result comparisons should only be made within the same method.      SERIAL TROPONIN-INITIAL - Normal    Troponin I, High Sensitivity 5      Narrative:     Less than 99th percentile of normal range cutoff-  Female and children under 18 years old <14 ng/L; Male <21 ng/L: Negative  Repeat testing should be performed if clinically indicated.     Female and children under 18 years old 14-50 ng/L; Male 21-50 ng/L:  Consistent with possible cardiac damage and possible increased clinical   risk. Serial measurements may help to assess extent of myocardial damage.     >50 ng/L: Consistent with cardiac damage, increased clinical risk and  myocardial infarction. Serial measurements may help assess extent of   myocardial damage.      NOTE: Children less than 1 year old may have higher baseline troponin   levels and results should be interpreted in conjunction with the overall   clinical context.     NOTE: Troponin I testing is performed using a different   testing methodology at [facility] than at other   [facility]. Direct result comparisons should only   be made within the same method.   URINALYSIS WITH REFLEX CULTURE AND MICROSCOPIC - Normal    Color, Urine Straw      Appearance, Urine Clear      Specific Gravity, Urine 1.009      pH, Urine 7.0      Protein, Urine NEGATIVE      Glucose, Urine NEGATIVE      Blood, Urine NEGATIVE      Ketones, Urine NEGATIVE      Bilirubin, Urine NEGATIVE      Urobilinogen, Urine <2.0      Nitrite, Urine NEGATIVE      Leukocyte Esterase, Urine NEGATIVE     SERIAL TROPONIN, 1 HOUR - Normal    Troponin I, High Sensitivity 5      Narrative:     Less than 99th percentile of normal range cutoff-  Female and children under 18 years old <14 ng/L; Male <21 ng/L: Negative  Repeat testing should be performed if clinically indicated.     Female and children under 18 years old 14-50 ng/L; Male 21-50 ng/L:  Consistent with possible cardiac damage and possible increased clinical   risk.  Serial measurements may help to assess extent of myocardial damage.     >50 ng/L: Consistent with cardiac damage, increased clinical risk and  myocardial infarction. Serial measurements may help assess extent of   myocardial damage.      NOTE: Children less than 1 year old may have higher baseline troponin   levels and results should be interpreted in conjunction with the overall   clinical context.     NOTE: Troponin I testing is performed using a different   testing methodology at [facility] than at other   [facility]. Direct result comparisons should only   be made within the same method.   LIPASE - Normal    Lipase 20      Narrative:     Venipuncture immediately after or during the administration of Metamizole may lead to falsely low results. Testing should be performed immediately prior to Metamizole dosing.   TROPONIN SERIES- (INITIAL, 1 HR)    Narrative:     The following orders were created for panel order Troponin I Series, High Sensitivity (0, 1 HR).  Procedure                               Abnormality         Status                     ---------                               -----------         ------                     Troponin I, High Sensiti...[[id number]]  Normal              Final result               Troponin, High Sensitivi...[[id number]]  Normal              Final result                 Please view results for these tests on the individual orders.   CBC WITH AUTO DIFFERENTIAL    WBC 7.0      nRBC 0.0      RBC 5.13      Hemoglobin 15.4      Hematocrit 45.6      MCV 89      MCH 30.0      MCHC 33.8      RDW 11.9      Platelets 199      Neutrophils % 67.3      Immature Granulocytes %, Automated 0.3      Lymphocytes % 20.2      Monocytes % 7.3      Eosinophils % 3.8      Basophils % 1.1      Neutrophils Absolute 4.73      Immature Granulocytes Absolute, Automated 0.02      Lymphocytes Absolute 1.42      Monocytes Absolute 0.51      Eosinophils Absolute 0.27      Basophils Absolute  0.08     GRAY TOP    Extra Tube Hold for add-ons.     URINALYSIS WITH REFLEX CULTURE AND MICROSCOPIC    Narrative:     The following orders were created for panel order Urinalysis with Reflex Culture and Microscopic.  Procedure                               Abnormality         Status                     ---------                               -----------         ------                     Urinalysis with Reflex C...[[id number]]  Normal              Final result               Extra Urine Gray Tube[[id number]]                            In process                   Please view results for these tests on the individual orders.   EXTRA URINE GRAY TUBE        CT abdomen pelvis wo IV contrast   Final Result   2 mm nonobstructing stone at the left UVJ. No other renal or ureteral   calculi.   Cholelithiasis.   Scattered diverticula on the colon without diverticulitis.   No free air or fluid.   Normal appendix.   Signed by Louis Amaya MD      US scrotum   Final Result   Hypoechoic areas within both testes of uncertain etiology.  No   evidence of testicular torsion or orchitis   Signed by Boo Rizzo MD      XR chest 1 view   Final Result   No acute process.   Signed by Louis Amaya MD            Shared TARA Attestation:    This patient was seen by the advanced practice provider.  I personally saw the patient and made/approved the management plan and take responsibility for the patient management.    History: 68-year-old male presents with right flank pain.    Exam: Regular rate and rhythm cardiac exam with clear breath sounds bilaterally.  Abdomen is soft with some mild tenderness to the right abdomen.  Neurological exam is grossly intact.  Negative Homans' sign bilaterally.    MDM: Renal colic, pyelonephritis, epididymitis, ACS, muscle strain    I have seen and examined the patient, agree with the workup, evaluation, medical decision making, management and diagnosis.  The care plan has been discussed.    Aravind GROSS  MD Pranay        Procedure  ECG 12 lead    Performed by: Kelvin Coburn PA-C  Authorized by: Aravind EVANS MD    Interpretation:     Details:  My EKG interpretation  Rate:     ECG rate:  81    ECG rate assessment: normal    Rhythm:     Rhythm: sinus rhythm    ST segments:     ST segments:  Normal  T waves:     T waves: normal         Kelvin Coburn PA-C  02/02/24 1322

## 2024-02-03 DIAGNOSIS — N20.1 CALCULUS OF URETEROVESICAL JUNCTION (UVJ): ICD-10-CM

## 2024-02-03 LAB
ATRIAL RATE: 58 BPM
P AXIS: 41 DEGREES
P OFFSET: 187 MS
P ONSET: 135 MS
PR INTERVAL: 168 MS
Q ONSET: 219 MS
QRS COUNT: 10 BEATS
QRS DURATION: 98 MS
QT INTERVAL: 414 MS
QTC CALCULATION(BAZETT): 406 MS
QTC FREDERICIA: 409 MS
R AXIS: -43 DEGREES
T AXIS: 20 DEGREES
T OFFSET: 426 MS
VENTRICULAR RATE: 58 BPM

## 2024-02-05 ENCOUNTER — LAB (OUTPATIENT)
Dept: LAB | Facility: LAB | Age: 69
End: 2024-02-05
Payer: MEDICARE

## 2024-02-05 DIAGNOSIS — N20.1 CALCULUS OF URETEROVESICAL JUNCTION (UVJ): ICD-10-CM

## 2024-02-05 PROCEDURE — 87086 URINE CULTURE/COLONY COUNT: CPT

## 2024-02-05 PROCEDURE — 88112 CYTOPATH CELL ENHANCE TECH: CPT | Performed by: PATHOLOGY

## 2024-02-05 PROCEDURE — 88112 CYTOPATH CELL ENHANCE TECH: CPT

## 2024-02-06 ENCOUNTER — HOSPITAL ENCOUNTER (OUTPATIENT)
Dept: RADIOLOGY | Facility: HOSPITAL | Age: 69
Discharge: HOME | End: 2024-02-06
Payer: MEDICARE

## 2024-02-06 DIAGNOSIS — N20.1 CALCULUS OF URETEROVESICAL JUNCTION (UVJ): ICD-10-CM

## 2024-02-06 LAB
BACTERIA UR CULT: NO GROWTH
LABORATORY COMMENT REPORT: NORMAL
LABORATORY COMMENT REPORT: NORMAL
PATH REPORT.FINAL DX SPEC: NORMAL
PATH REPORT.GROSS SPEC: NORMAL
PATH REPORT.RELEVANT HX SPEC: NORMAL
PATH REPORT.TOTAL CANCER: NORMAL

## 2024-02-06 PROCEDURE — 74178 CT ABD&PLV WO CNTR FLWD CNTR: CPT | Performed by: RADIOLOGY

## 2024-02-06 PROCEDURE — 76377 3D RENDER W/INTRP POSTPROCES: CPT

## 2024-02-06 PROCEDURE — 2550000001 HC RX 255 CONTRASTS

## 2024-02-06 PROCEDURE — 76377 3D RENDER W/INTRP POSTPROCES: CPT | Performed by: RADIOLOGY

## 2024-02-06 RX ADMIN — IOHEXOL 100 ML: 350 INJECTION, SOLUTION INTRAVENOUS at 13:52

## 2024-02-19 ENCOUNTER — OFFICE VISIT (OUTPATIENT)
Dept: UROLOGY | Facility: CLINIC | Age: 69
End: 2024-02-19
Payer: MEDICARE

## 2024-02-19 VITALS
WEIGHT: 214.07 LBS | HEART RATE: 68 BPM | SYSTOLIC BLOOD PRESSURE: 134 MMHG | BODY MASS INDEX: 34.4 KG/M2 | HEIGHT: 66 IN | DIASTOLIC BLOOD PRESSURE: 84 MMHG | RESPIRATION RATE: 16 BRPM

## 2024-02-19 DIAGNOSIS — N20.1 URETERAL CALCULUS: ICD-10-CM

## 2024-02-19 DIAGNOSIS — N40.1 BPH ASSOCIATED WITH NOCTURIA: Primary | ICD-10-CM

## 2024-02-19 DIAGNOSIS — R35.1 BPH ASSOCIATED WITH NOCTURIA: Primary | ICD-10-CM

## 2024-02-19 PROCEDURE — 1159F MED LIST DOCD IN RCRD: CPT | Performed by: UROLOGY

## 2024-02-19 PROCEDURE — 1160F RVW MEDS BY RX/DR IN RCRD: CPT | Performed by: UROLOGY

## 2024-02-19 PROCEDURE — 1126F AMNT PAIN NOTED NONE PRSNT: CPT | Performed by: UROLOGY

## 2024-02-19 PROCEDURE — 3075F SYST BP GE 130 - 139MM HG: CPT | Performed by: UROLOGY

## 2024-02-19 PROCEDURE — 3079F DIAST BP 80-89 MM HG: CPT | Performed by: UROLOGY

## 2024-02-19 PROCEDURE — 99204 OFFICE O/P NEW MOD 45 MIN: CPT | Performed by: UROLOGY

## 2024-02-19 PROCEDURE — 1036F TOBACCO NON-USER: CPT | Performed by: UROLOGY

## 2024-02-19 ASSESSMENT — PAIN SCALES - GENERAL: PAINLEVEL: 0-NO PAIN

## 2024-02-19 ASSESSMENT — ENCOUNTER SYMPTOMS
HEMATURIA: 0
FLANK PAIN: 1
DYSURIA: 0
DIFFICULTY URINATING: 1
FREQUENCY: 0

## 2024-02-19 NOTE — PROGRESS NOTES
68-year-old white male referred by [facility] emergency room, Dr. Aravind Pires..  Creatinine 0.87.  Urinalysis is negative for blood.  PSA 0.61.  CAT scan identifies a 2 mm left UVJ stone.  Patient is retired in 2013 from a [employer] company.  No family history of prostate or breast cancer.  The patient has never smoked cigarettes.    February 19, 2024, patient arrives with his wife of 49 years, Ольга.  He states that he has nocturia x 8, however this has been consistent for the past 30 years and is not interested in evaluation at this time.  CT urogram does not show any evidence of nephrolithiasis, hydronephrosis or ureteral calculi.  3.5 cm right simple renal cyst.  Urinalysis is negative for blood and urine cytology showed no malignant cells.  Urine culture no growth.  He will return in November.    PLAN:    1.  The patient will return November 2024 with a PSA and renal colic CAT scan    Physical Exam  Vitals and nursing note reviewed.   Constitutional:       Appearance: Normal appearance.   HENT:      Head: Normocephalic and atraumatic.   Pulmonary:      Effort: Pulmonary effort is normal.   Abdominal:      Palpations: Abdomen is soft.      Tenderness: There is no abdominal tenderness.   Musculoskeletal:         General: Normal range of motion.      Cervical back: Normal range of motion and neck supple.   Neurological:      General: No focal deficit present.      Mental Status: He is alert and oriented to person, place, and time.   Psychiatric:         Mood and Affect: Mood normal.         Behavior: Behavior normal.        This note was created with voice-recognition software and was not corrected for typographical or grammatical errors.

## 2024-02-19 NOTE — PATIENT INSTRUCTIONS
It was very nice to meet you and your wife Ольга.  We reviewed your recent CT urogram which does not show any stones in the urinary tract.  You do have a simple, benign-appearing cyst in the right kidney which we will continue to monitor.  No blood in the urine, and no urinary tract infections noted.  Also, no malignant cells in the urine.  You are not having any pain at this time.  He also do wake at night approximately 8 times to urinate.  However you state that this is routine for you over the past 30 years and you are not interested in further evaluation at this time.  I will see you again in November.     This note was created with voice-recognition software and was not corrected for typographical or grammatical errors.

## 2024-02-19 NOTE — PROGRESS NOTES
Patient present with wife, Ольга. Patient denies any pain today. Patient has not had any recent surgeries or hospital admits. Patient denies any concerns about falling or safety. Patient has concerns for nocturia x8, weak stream. Per patient Dr. Bedolla told him he needs to see urology. CV     Review of Systems   Genitourinary:  Positive for difficulty urinating, enuresis and flank pain. Negative for dysuria, frequency, hematuria, penile pain, penile swelling, scrotal swelling, testicular pain and urgency.

## 2024-02-19 NOTE — LETTER
February 19, 2024     Rizwan Norris DO  [address]  [address]  [facility] [address]    Patient: Demetrio Malave   YOB: 1955   Date of Visit: 2/19/2024       Dear Dr. Rizwan Norris DO:    Thank you for referring Demetrio Malave to me for evaluation. Below are my notes for this consultation.  If you have questions, please do not hesitate to call me. I look forward to following your patient along with you.       Sincerely,     Renny Rain MD      CC: No Recipients  ______________________________________________________________________________________    68-year-old white male referred by [facility] emergency room, Dr. Aravind Pires..  Creatinine 0.87.  Urinalysis is negative for blood.  PSA 0.61.  CAT scan identifies a 2 mm left UVJ stone.  Patient is retired in 2013 from a [employer] company.  No family history of prostate or breast cancer.  The patient has never smoked cigarettes.    February 19, 2024, patient arrives with his wife of 49 years, Ольга.  He states that he has nocturia x 8, however this has been consistent for the past 30 years and is not interested in evaluation at this time.  CT urogram does not show any evidence of nephrolithiasis, hydronephrosis or ureteral calculi.  3.5 cm right simple renal cyst.  Urinalysis is negative for blood and urine cytology showed no malignant cells.  Urine culture no growth.  He will return in November.    PLAN:    1.  The patient will return November 2024 with a PSA and renal colic CAT scan    Physical Exam  Vitals and nursing note reviewed.   Constitutional:       Appearance: Normal appearance.   HENT:      Head: Normocephalic and atraumatic.   Pulmonary:      Effort: Pulmonary effort is normal.   Abdominal:      Palpations: Abdomen is soft.      Tenderness: There is no abdominal tenderness.   Musculoskeletal:         General: Normal range of motion.      Cervical back: Normal range of motion and neck supple.   Neurological:       General: No focal deficit present.      Mental Status: He is alert and oriented to person, place, and time.   Psychiatric:         Mood and Affect: Mood normal.         Behavior: Behavior normal.        This note was created with voice-recognition software and was not corrected for typographical or grammatical errors.

## 2024-02-28 DIAGNOSIS — Z12.5 SCREENING PSA (PROSTATE SPECIFIC ANTIGEN): ICD-10-CM

## 2024-02-28 DIAGNOSIS — N28.1 RENAL CYST: ICD-10-CM

## 2024-03-14 ENCOUNTER — LAB (OUTPATIENT)
Dept: LAB | Facility: LAB | Age: 69
End: 2024-03-14
Payer: MEDICARE

## 2024-03-14 DIAGNOSIS — I10 ESSENTIAL (PRIMARY) HYPERTENSION: ICD-10-CM

## 2024-03-14 DIAGNOSIS — E78.5 HYPERLIPIDEMIA, UNSPECIFIED: Primary | ICD-10-CM

## 2024-03-14 LAB
ALBUMIN SERPL BCP-MCNC: 4.3 G/DL (ref 3.4–5)
ALP SERPL-CCNC: 58 U/L (ref 33–136)
ALT SERPL W P-5'-P-CCNC: 34 U/L (ref 10–52)
ANION GAP SERPL CALC-SCNC: 9 MMOL/L (ref 10–20)
AST SERPL W P-5'-P-CCNC: 27 U/L (ref 9–39)
BILIRUB SERPL-MCNC: 1.4 MG/DL (ref 0–1.2)
BUN SERPL-MCNC: 18 MG/DL (ref 6–23)
CALCIUM SERPL-MCNC: 8.9 MG/DL (ref 8.6–10.3)
CHLORIDE SERPL-SCNC: 105 MMOL/L (ref 98–107)
CHOLEST SERPL-MCNC: 89 MG/DL (ref 0–199)
CHOLESTEROL/HDL RATIO: 2.4
CO2 SERPL-SCNC: 30 MMOL/L (ref 21–32)
CREAT SERPL-MCNC: 0.87 MG/DL (ref 0.5–1.3)
EGFRCR SERPLBLD CKD-EPI 2021: >90 ML/MIN/1.73M*2
GLUCOSE SERPL-MCNC: 88 MG/DL (ref 74–99)
HDLC SERPL-MCNC: 36.9 MG/DL
LDLC SERPL CALC-MCNC: 40 MG/DL
NON HDL CHOLESTEROL: 52 MG/DL (ref 0–149)
POTASSIUM SERPL-SCNC: 4.2 MMOL/L (ref 3.5–5.3)
PROT SERPL-MCNC: 6.3 G/DL (ref 6.4–8.2)
SODIUM SERPL-SCNC: 140 MMOL/L (ref 136–145)
TRIGL SERPL-MCNC: 62 MG/DL (ref 0–149)
VLDL: 12 MG/DL (ref 0–40)

## 2024-03-14 PROCEDURE — 36415 COLL VENOUS BLD VENIPUNCTURE: CPT

## 2024-03-14 PROCEDURE — 80053 COMPREHEN METABOLIC PANEL: CPT

## 2024-03-14 PROCEDURE — 80061 LIPID PANEL: CPT

## 2024-03-20 ENCOUNTER — HOSPITAL ENCOUNTER (EMERGENCY)
Facility: HOSPITAL | Age: 69
Discharge: HOME | End: 2024-03-20
Payer: MEDICARE

## 2024-03-20 ENCOUNTER — APPOINTMENT (OUTPATIENT)
Dept: RADIOLOGY | Facility: HOSPITAL | Age: 69
End: 2024-03-20
Payer: MEDICARE

## 2024-03-20 VITALS
BODY MASS INDEX: 32.95 KG/M2 | OXYGEN SATURATION: 98 % | RESPIRATION RATE: 17 BRPM | HEART RATE: 71 BPM | HEIGHT: 66 IN | DIASTOLIC BLOOD PRESSURE: 81 MMHG | TEMPERATURE: 97.9 F | WEIGHT: 205 LBS | SYSTOLIC BLOOD PRESSURE: 157 MMHG

## 2024-03-20 DIAGNOSIS — M25.562 ACUTE PAIN OF LEFT KNEE: Primary | ICD-10-CM

## 2024-03-20 PROCEDURE — 73700 CT LOWER EXTREMITY W/O DYE: CPT | Mod: LEFT SIDE | Performed by: RADIOLOGY

## 2024-03-20 PROCEDURE — 73590 X-RAY EXAM OF LOWER LEG: CPT | Mod: LT

## 2024-03-20 PROCEDURE — 2500000001 HC RX 250 WO HCPCS SELF ADMINISTERED DRUGS (ALT 637 FOR MEDICARE OP): Performed by: PHYSICIAN ASSISTANT

## 2024-03-20 PROCEDURE — 73590 X-RAY EXAM OF LOWER LEG: CPT | Mod: LEFT SIDE | Performed by: RADIOLOGY

## 2024-03-20 PROCEDURE — 73564 X-RAY EXAM KNEE 4 OR MORE: CPT | Mod: LEFT SIDE | Performed by: RADIOLOGY

## 2024-03-20 PROCEDURE — 99284 EMERGENCY DEPT VISIT MOD MDM: CPT | Mod: 25

## 2024-03-20 PROCEDURE — 73564 X-RAY EXAM KNEE 4 OR MORE: CPT | Mod: LT

## 2024-03-20 PROCEDURE — 73700 CT LOWER EXTREMITY W/O DYE: CPT | Mod: LT

## 2024-03-20 RX ORDER — HYDROCODONE BITARTRATE AND ACETAMINOPHEN 5; 325 MG/1; MG/1
1 TABLET ORAL ONCE
Status: COMPLETED | OUTPATIENT
Start: 2024-03-20 | End: 2024-03-20

## 2024-03-20 RX ADMIN — HYDROCODONE BITARTRATE AND ACETAMINOPHEN 1 TABLET: 5; 325 TABLET ORAL at 01:01

## 2024-03-20 ASSESSMENT — PAIN DESCRIPTION - PAIN TYPE: TYPE: ACUTE PAIN

## 2024-03-20 ASSESSMENT — COLUMBIA-SUICIDE SEVERITY RATING SCALE - C-SSRS
2. HAVE YOU ACTUALLY HAD ANY THOUGHTS OF KILLING YOURSELF?: NO
6. HAVE YOU EVER DONE ANYTHING, STARTED TO DO ANYTHING, OR PREPARED TO DO ANYTHING TO END YOUR LIFE?: NO
1. IN THE PAST MONTH, HAVE YOU WISHED YOU WERE DEAD OR WISHED YOU COULD GO TO SLEEP AND NOT WAKE UP?: NO

## 2024-03-20 ASSESSMENT — PAIN DESCRIPTION - LOCATION: LOCATION: KNEE

## 2024-03-20 ASSESSMENT — PAIN - FUNCTIONAL ASSESSMENT: PAIN_FUNCTIONAL_ASSESSMENT: 0-10

## 2024-03-20 ASSESSMENT — PAIN DESCRIPTION - DESCRIPTORS
DESCRIPTORS: ACHING
DESCRIPTORS: ACHING

## 2024-03-20 ASSESSMENT — PAIN SCALES - GENERAL
PAINLEVEL_OUTOF10: 8
PAINLEVEL_OUTOF10: 9

## 2024-03-20 ASSESSMENT — PAIN DESCRIPTION - ORIENTATION: ORIENTATION: LEFT

## 2024-03-20 NOTE — ED PROVIDER NOTES
"HPI   Chief Complaint   Patient presents with    Knee Pain     Left  knee pain that started this aternnon. Pt denies any injury o trauma. States pain \" came out of nowhere\"       68-year-old male with a history of hypertension, hyperlipidemia presenting to the ED today with pain to his left knee that started around 7 or 8 PM this evening.  Patient tells me at the time of the pain starting he was not doing any strenuous activity but he normally does walk on a treadmill every day however his pain started afterwards.  He did not fall onto the knee or have any twisting injury.  He states his pain is on the outside portion of the knee anytime he puts weight on it or tries to fully extend it.  He is also having pain in the front of the knee more towards the top of his tibia.  He has not noticed any bruising or redness or swelling and he does not have numbness, weakness or paresthesias.  He does not have any pain in the back portion of the knee or into the calf.  No previous surgeries or injuries to this knee or history of gout.  No further complaints.      History provided by:  Patient                      [place] Coma Scale Score: 15                     Patient History   Past Medical History:   Diagnosis Date    Prostatitis      Past Surgical History:   Procedure Laterality Date    CT ANGIO CORONARY ART WITH HEARTFLOW IF SCORE >30%  2023    CT HEART CORONARY ANGIOGRAM ELY [id number] CT    HERNIA REPAIR  2017    Hernia Repair    OTHER SURGICAL HISTORY  2018    Shoulder surgery     Family History   Problem Relation Name Age of Onset    Leukemia Father      Colon cancer Father       Social History     Tobacco Use    Smoking status: Former     Types: Cigars     Quit date: 1980     Years since quittin.2    Smokeless tobacco: Never   Substance Use Topics    Alcohol use: Not Currently    Drug use: Never       Physical Exam   ED Triage Vitals [24 0013]   Temperature Heart Rate Respirations BP   36.6 °C " (97.9 °F) 71 17 157/81      Pulse Ox Temp Source Heart Rate Source Patient Position   98 % Tympanic Monitor Sitting      BP Location FiO2 (%)     Right arm --       Physical Exam  Constitutional:       General: He is not in acute distress.  Eyes:      Conjunctiva/sclera: Conjunctivae normal.   Cardiovascular:      Comments: No peripheral edema.  Distal pulses intact and symmetric.  Cap refill less than 2 seconds.  Musculoskeletal:      Comments: Tenderness to lateral aspect of left knee and to proximal tibia without edema, crepitus, erythema or ecchymosis.  No calf tenderness or swelling bilaterally.  No popliteal tenderness or palpable cord.  Decreased extension and flexion secondary to pain.  Negative anterior/posterior drawer sign.  No patellar instability or patellar tenderness. NVI   Skin:     General: Skin is warm.      Capillary Refill: Capillary refill takes less than 2 seconds.   Neurological:      Mental Status: He is alert and oriented to person, place, and time.       ED Course & MDM   Diagnoses as of 03/20/24 0307   Acute pain of left knee       Medical Decision Making  68-year-old male with a history of hypertension, hyperlipidemia presenting to the ED today with anterior and lateral left knee pain that started around 7 or 8 PM without known trauma or injury or twisting mechanism.  There is no swelling redness or bruising to the knee and he denies numbness weakness or paresthesia.  Pain is exacerbated by extension and ambulating on the knee.  No further complaints and he arrives afebrile with stable vital signs.  On my exam there is no obvious signs of trauma or infection in the knee.  He is tender over the proximal tibia and the lateral aspect of the knee but there is no patellar tenderness or instability, negative anterior/posterior drawer sign.  He does not have any edema throughout the leg and there is no calf tenderness or swelling.  He has good distal pulses and is neurovascularly intact.  X-rays  ordered as well as pain medication.    X-rays of the left knee and tib-fib today show no acute osseous abnormality.  There is trace meniscal calcifications but no other acute abnormalities.  After pain medication we did attempt to ambulate the patient but he is stating that it is too painful to walk on.  With the patient's tenderness, pain and  inability to put weight through the leg due to the pain we will obtain a CT for further evaluation.    CT of the left knee shows no acute displaced fracture or dislocation.  There is some prepatellar soft tissue swelling and osteoarthrosis but no other acute abnormality.  Patient states that after pain medication has kicked him out while his pain has resolved and he is able to put some pressure through the leg.  He states that when he twisted his lower leg medially he is feeling the pain on the lateral aspect of the knee.  I do feel this could be from possible strain versus his arthritis.  We will discharge him home I discussed RICE therapy as well as use of Tylenol for pain relief and follow-up with orthopedics.  He expressed understanding and agreed with this plan.    Labs Reviewed - No data to display    CT knee left wo IV contrast   Final Result   No acute displaced fracture or dislocation. Prepatellar soft tissue   swelling. Patellofemoral joint osteoarthrosis.   Punctate areas of increased density in both the lateral and medial   compartments which may be seen with chondrocalcinosis.        MACRO:   None.        Signed by: Evan Finkelstein 3/20/2024 2:42 AM   Dictation workstation:   [id number]      XR knee left 4+ views   Final Result   1. No acute osseous abnormality identified.   2. Trace meniscal calcifications suggesting calcium pyrophosphate   deposition.                  Signed by: Silvio Wiggins 3/20/2024 1:23 AM   Dictation workstation:   [id number]      XR tibia fibula left 2 views   Final Result   1. No acute osseous abnormality identified.   2. Trace  meniscal calcifications suggesting calcium pyrophosphate   deposition.                  Signed by: Silvio Wiggins 3/20/2024 1:23 AM   Dictation workstation:   [id number]            Procedure  Procedures     Stefania Paniagua PA-C  03/20/24 0313

## 2024-04-15 ENCOUNTER — OFFICE VISIT (OUTPATIENT)
Dept: NEUROLOGY | Facility: CLINIC | Age: 69
End: 2024-04-15
Payer: MEDICARE

## 2024-04-15 VITALS
HEART RATE: 63 BPM | HEIGHT: 66 IN | DIASTOLIC BLOOD PRESSURE: 72 MMHG | SYSTOLIC BLOOD PRESSURE: 115 MMHG | BODY MASS INDEX: 34.67 KG/M2 | WEIGHT: 215.7 LBS

## 2024-04-15 DIAGNOSIS — R25.1 TREMOR: ICD-10-CM

## 2024-04-15 PROCEDURE — 1036F TOBACCO NON-USER: CPT | Performed by: PSYCHIATRY & NEUROLOGY

## 2024-04-15 PROCEDURE — 99204 OFFICE O/P NEW MOD 45 MIN: CPT | Performed by: PSYCHIATRY & NEUROLOGY

## 2024-04-15 PROCEDURE — 3074F SYST BP LT 130 MM HG: CPT | Performed by: PSYCHIATRY & NEUROLOGY

## 2024-04-15 PROCEDURE — 1160F RVW MEDS BY RX/DR IN RCRD: CPT | Performed by: PSYCHIATRY & NEUROLOGY

## 2024-04-15 PROCEDURE — 3078F DIAST BP <80 MM HG: CPT | Performed by: PSYCHIATRY & NEUROLOGY

## 2024-04-15 PROCEDURE — 1159F MED LIST DOCD IN RCRD: CPT | Performed by: PSYCHIATRY & NEUROLOGY

## 2024-04-15 ASSESSMENT — FAHN TOLOSA MARTIN TREMOR (FTM)
DRAWING B LEFT SCORE: 0
RLE TOTAL SCORE: 0
DRAWING A LEFT SCORE: 0
HANDWRITING WITH DOMINANT HAND: 0
PART A SUBTOTAL SCORE: 3
RUE TOTAL SCORE: 1
UE ARM AT REST: 0
UE ARMS OUTSTRECHED WRISTS MILDLY EXTENDED FINGERS SPREAD APART: 0
DRAWING C RIGHT SCORE: 1
UE FINGER TO NOSE AND OTHER ACTIONS: 1
LE LEG FLEXED AT HIPS AND KNEES AT POSTURE: 0
UE ARM AT REST: 0
LE AT REST: 0
FACE IN REPOSE: 0
LE TOE TO FINGER IN A FLEXED POSTURE: 0
DRAWING B RGHT SCORE: 2
LE TOE TO FINGER IN A FLEXED POSTURE: 0
TOUNGE AT REST: 0
UE FINGER TO NOSE AND OTHER ACTIONS: 1
TRUNK TOTAL SCORE: 0
UE ARMS OUTSTRECHED WRISTS MILDLY EXTENDED FINGERS SPREAD APART: 1
HEAD IN REPOSE: 0
HEAD TOTAL SCORE: 0
RUE TOTAL SCORE: 2
TRUNK  WHEN SITTING OR STANDING: 0
LE AT REST: 0
HEAD AT POSTURE WHEN STANDING OR SITTING: 0
VOICE IN ACTION: 0
TOUNGE WHEN PROTUDED: 0
FACE TOTAL: 0
DRAWING B TOTAL SCORE: 2
FACE AT POSTURE WHEN STANDING OR SITTING: 0
TRUNK IN REPOSE: 0
VOICE TOTAL SCORE: 0
LLE TOTAL SCORE: 0
DRAWING C LEFT SCORE: 0
DRAWING A RIGHT SCORE: 1
LE LEG FLEXED AT HIPS AND KNEES AT POSTURE: 0
DRAWING C TOTAL SCORE: 1
DRAWING A TOTAL SCORE: 1
TOUNGE TOTAL SCORE: 0

## 2024-04-15 ASSESSMENT — UNIFIED PARKINSONS DISEASE RATING SCALE (UPDRS)
TOETAPPING_RIGHT: 0
TOETAPPING_LEFT: 1
AMPLITUDE_RUE: 0
FINGER_TAPPING_LEFT: 0
CONSTANCY_TREMOR_ATREST: 0
RIGIDITY_RLE: 0
FACIAL_EXPRESSION: 0
AMPLITUDE_RLE: 0
AMPLITUDE_LLE: 0
GAIT: 0
RIGIDITY_RUE: 0
LEG_AGILITY_LEFT: 0
RIGIDITY_LLE: 0
FINGER_TAPPING_RIGHT: 0
LEG_AGILITY_RIGHT: 0
LEVODOPA: NO
RIGIDITY_NECK: 0
PRONATION_SUPINATION_RIGHT: 0
RIGIDITY_LUE: 0
SPEECH: 1
POSTURE: 0
TOTAL_SCORE: 5
FREEZING_GAIT: 0
HANDMOVEMENTS_RIGHT: 0
PRONATION_SUPINATION_LEFT: 0
POSTURAL_STABILITY: 0
POSTURAL_TREMOR_RIGHTHAND: 1
SPONTANEITY_OF_MOVEMENT: 0
POSTURAL_TREMOR_LEFTHAND: 0
KINETIC_TREMOR_LEFTHAND: 1
KINETIC_TREMOR_RIGHTHAND: 1
AMPLITUDE_LUE: 0
AMPLITUDE_LIP_JAW: 0
CHAIR_RISING_SCALE: 0

## 2024-04-15 ASSESSMENT — PATIENT HEALTH QUESTIONNAIRE - PHQ9
1. LITTLE INTEREST OR PLEASURE IN DOING THINGS: NOT AT ALL
1. LITTLE INTEREST OR PLEASURE IN DOING THINGS: NOT AT ALL
2. FEELING DOWN, DEPRESSED OR HOPELESS: NOT AT ALL
2. FEELING DOWN, DEPRESSED OR HOPELESS: NOT AT ALL
SUM OF ALL RESPONSES TO PHQ9 QUESTIONS 1 & 2: 0
SUM OF ALL RESPONSES TO PHQ9 QUESTIONS 1 AND 2: 0

## 2024-04-15 ASSESSMENT — ENCOUNTER SYMPTOMS
OCCASIONAL FEELINGS OF UNSTEADINESS: 0
DEPRESSION: 0
LOSS OF SENSATION IN FEET: 0

## 2024-04-15 NOTE — PATIENT INSTRUCTIONS
It was nice to see you today.     You have a mild tremor, but otherwise the rest of the exam was normal.   You have a strong family hx of Parkinson's disease, but there were findings to suggest same at this time on your exam. I will watch you over time, if needed we can do a Ashtyn scan - this is a dopamine transporter scan - in the future.     Please stay physically active (get heart rate up at least 3 times per week) as well as a healthy diet.     RTC in 9 months to a year or call for sooner appointment if something changes.     Tell your sisters about the PDGeneration study - which is offered both here at  or at [facility] if they get their care there, which checks for genetic markers for Parkinson's disease.

## 2024-04-15 NOTE — LETTER
April 15, 2024     Rizwan Norris DO  [address]  [address]  [facility] [address]    Patient: Demetrio Malave   YOB: 1955   Date of Visit: 4/15/2024       Dear Dr. Rizwan Norris DO:    Thank you for referring Demetrio Malave to me for evaluation. Below are my notes for this consultation.  If you have questions, please do not hesitate to call me. I look forward to following your patient along with you.       Sincerely,     Roxanne Gallo MD      CC: No Recipients  ______________________________________________________________________________________    Subjective    Demetrio Malave is a right handed  68 y.o. year old male who presents with Tremors (NPV FOR TREMORS). Patient is accompanied by: spouse  Visit type: new patient visit     Tremor of hands for many years, maybe 30 years back, has gradually worsened over the last few years, more so.  Was mostly in R hand but has progressed to involve the L hand. Tremor is mostly w fine motor skills, drinking something or eating something. Slicing is difficult.   Tries to stay away from soups etc. Tremor gets worse when excited or nervous. No clear etoh response, but does not drink much at all. Raymundo coffee cup up full. Shaves w normal razor. When was working sometimes had to hold w both hands, to prevent shaking.   No voice, head or leg tremor.     Mother had PD, oldest brother had PD and dementia (diagnosed around late 50s, early 60s, and passed away at 73). Two older sisters have PD, at age 78, and 73.   He is number 8 of 12 siblings.   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   Stiffness/rigidity: feels stiff and has some body aches also has  arthritis  Slowness:  little slow when first gets up from being seated for a while.   Trouble walking: no shuffling, can keep up w pairs.   Freezing of gait:  denies  Balance problems:  mild dizziness due to BP issues.   Falls:  denies  Changes in speech:  no change.   Swallowing difficulties:  denies  Abnormal postures:  denies  Handwriting: most of time okay, if nervous it is hard, messy, no micrographia  Activities of daily living (buttoning clothes, bathing, cutting food, etc):  independent    Review of Non-Motor symptoms:  Cognition:  Memory- some changes, can forget names of thing. Wife does not have concerns apart from being a little slower.          Hallucinations-  denies        Mood:        Depression-  denies                       Anxiety: denies  Sleep disturbances including REM behavior disorder: broken sleep, gets 5-7 hours per night, does fling out as part of dreams sometimes.   Sensory changes (ie, smell or taste):  ? Hyposmia, states he does not smell things as acutely as his wife.   Cramps/pain:  adeel horses at times  Gastrointestinal complaints/Constipation:  denies  Urinary retention or frequency:  frequency, especially at night.   Positional lightheadedness and/or syncope:  dizzy when stands up. Subsides quickly.   Excessive saliva/drooling:  denies.  Fatigue:  denies    Patient Active Problem List   Diagnosis   • Abnormal computed tomography angiography of heart   • Actinic keratosis   • Other seborrheic keratosis   • Arthritis   • BPH associated with nocturia   • Benign essential hypertension   • CAD (coronary artery disease)   • Chest pain   • Chronic rhinitis   • Chronic throat clearing   • Cluster headaches   • Colon polyps   • Epidermal cyst   • Epididymitis   • GERD without esophagitis   • Hemangioma of skin and subcutaneous tissue   • Hyperlipidemia   • Infected sebaceous cyst   • Neoplasm of uncertain behavior of skin   • Nocturnal polyuria   • Obesity, Class I, BMI 30-34.9   •  Obstructive sleep apnea   • Occasional tremors   • Other hypertrophic disorders of the skin   • Other viral warts   • Poikiloderma of [place]   • Rash and other nonspecific skin eruption   • Renal cyst, right   • Right groin pain   • Urinary frequency   • Ureteral calculus      Past Medical History:   Diagnosis Date   • Prostatitis       Past Surgical History:   Procedure Laterality Date   • CT ANGIO CORONARY ART WITH HEARTFLOW IF SCORE >30%  2023    CT HEART CORONARY ANGIOGRAM ELY [id number] CT   • HERNIA REPAIR  2017    Hernia Repair   • OTHER SURGICAL HISTORY  2018    Shoulder surgery      Social History     Socioeconomic History   • Marital status:      Spouse name: Not on file   • Number of children: Not on file   • Years of education: Not on file   • Highest education level: Not on file   Occupational History   • Not on file   Tobacco Use   • Smoking status: Former     Types: Cigars     Quit date:      Years since quittin.3   • Smokeless tobacco: Never   Substance and Sexual Activity   • Alcohol use: Not Currently   • Drug use: Never   • Sexual activity: Not on file   Other Topics Concern   • Not on file   Social History Narrative   • Not on file     Social Determinants of Health     Financial Resource Strain: Not on file   Food Insecurity: Not on file   Transportation Needs: Not on file   Physical Activity: Not on file   Stress: Not on file   Social Connections: Not on file   Intimate Partner Violence: Not on file   Housing Stability: Not on file      Family History   Problem Relation Name Age of Onset   • Leukemia Father     • Colon cancer Father        Patient Health Questionnaire-2 Score: 0        No Known Allergies       Current Outpatient Medications on File Prior to Visit   Medication Sig Dispense Refill   • amLODIPine (Norvasc) 5 mg tablet Take 1 tablet (5 mg) by mouth once daily. 90 tablet 3   • aspirin 81 mg chewable tablet Chew 1 tablet (81 mg) once daily. 90 tablet 3    • atorvastatin (Lipitor) 40 mg tablet Take 1 tablet (40 mg) by mouth once daily. 90 tablet 3   • isosorbide mononitrate ER (Imdur) 30 mg 24 hr tablet Take 1 tablet (30 mg) by mouth once every 24 hours. 90 tablet 3   • mometasone (Elocon) 0.1 % ointment Apply topically once daily. 45 g 2   • nitroglycerin (Nitrostat) 0.4 mg SL tablet as directed Sublingual     • [DISCONTINUED] naloxone (Narcan) 4 mg/0.1 mL nasal spray Administer 1 spray (4 mg) into affected nostril(s) if needed for opioid reversal or respiratory depression for up to 1 dose. May repeat every 2-3 minutes if needed, alternating nostrils, until medical assistance becomes available. (Patient not taking: Reported on 4/15/2024) 2 each 0     No current facility-administered medications on file prior to visit.        Review of Systems  All other system have been reviewed and are negative for complaint.  Objective  Vitals:    04/15/24 1330   BP: 115/72   Pulse: 63      Neurological Exam  Mental Status  Awake, alert and oriented to person, place and time.    Cranial Nerves  CN II: Visual fields full to confrontation.  CN III, IV, VI: Extraocular movements intact bilaterally. Pupils equal round and reactive to light bilaterally.  CN V: Facial sensation is normal.  CN VII: Full and symmetric facial movement.  CN VIII: Hearing is normal.  CN IX, X: Palate elevates symmetrically    Motor  Normal muscle bulk throughout. Strength is 5/5 throughout all four extremities.    Sensory  Light touch is normal in upper and lower extremities. Pinprick is normal in upper and lower extremities. Vibration is normal in upper and lower extremities.     Reflexes                                            Right                      Left  Brachioradialis                    2+                         2+  Biceps                                                         2+  Triceps                                2+                         2+  Patellar                                3+                          3+  Achilles                                2+                         2+  Right Plantar: downgoing  Left Plantar: downgoing    Coordination  Right: Finger-to-nose normal. Rapid alternating movement normal. Heel-to-shin normal.Left: Finger-to-nose normal. Rapid alternating movement normal. Heel-to-shin normal.    Gait  Normal casual, toe, heel and tandem gait.        MDS UPDRS 1st Score: Motor Examination  Is the patient on Levodopa?: No  Speech: 1  Facial Expression: 0  Rigidty Neck: 0  Rigidty RUE: 0  Rigidity - LUE: 0  Rigidity RLE: 0  Rigidity LLE: 0  Finger Tapping Right Hand: 0  Finger Tapping Left Hand: 0  Hand Movements- Right Hand: 0  Hand Movements- Left Hand: 0  Pronatiaon-Supination Movments - Right Hand: 0  Pronatiaon-Supination Movments Left Hand: 0  Toe Tapping Right Foot: 0  Toe Tapping - Left Foot: 1  Leg Agility - Right Le  Leg Agility - Left le  Arising from Chair: 0  Gait: 0  Freezing of Gait: 0  Postural Stability: 0  Posture: 0  Global Spontanteity of Movment ( Body Bradykinesia): 0  Postural Tremor - Right Hand: 1  Postural Tremor - Left hand: 0  Kinetic Tremor - Right hand: 1  Kinetic Tremor - Left hand: 1  Rest Tremor Amplitude - RUE: 0  Rest Tremor Amplitude - LUE: 0  Rest Tremor Amplitude - RLE: 0  Rest Tremor Amplitude - LLE: 0  Rest Tremor Amplitude - Lip/Jaw: 0  Constancy of Rest Tremor: 0  MDS UPDRS Total Score: 5      Fahn Dre García Tremor rating scale:   PART A Face at rest: 0, Face at posture: 0, Face Total: 0, Tongue at rest: 0, Tongue at posture: 0, Tongue total: 0, Voice in action: 0, Voice total: 0, Head at rest: 0, Head at posture: 0, Head total: 0, RUE at rest: 0, RUE at posture: 1, RUE in action: 1, RUE total: 2, LUE at rest: 0, LUE at posture: 0, LUE in action: 1, RUE total: 1, Trunk at rest: 0, Trunk at posture: 0, Trunk total: 0, RLE at rest: 0, RLE at posture: 0, RLE in action: 0, RLE total: 0, LLE at rest: 0, LLE at posture: 0, LLE in  action: 0, LLE total: 0, Part A subtotal: 3 PART B Handwriting dominant: 0, Drawing A - Right: 1, Drawing A - Left: 0, Drawing A total: 1, Drawing B - Right: 2, Drawing B - Left: 0, Drawing B total: 2, Drawing C - Right: 1, Drawing C - Left: 0, Drawing C total: 1         Hemoglobin A1C   Date Value Ref Range Status   10/25/2023 5.2 see below % Final     Estimated Average Glucose   Date Value Ref Range Status   10/25/2023 103 Not Established mg/dL Final     Thyroid Stimulating Hormone   Date Value Ref Range Status   10/25/2023 2.16 0.44 - 3.98 mIU/L Final           Assessment/Plan      Demetrio Malave is a 68 y.o. year old male here for evaluation of tremor, which is longstanding, and occurs primarily with fine motor tasks. He has a strong family hx of PD in his mother, and now 3 of his 11 siblings (12 children total). His exam shows a very slight kinetic tremor on FNF bilaterally and L hand w posture, but no significant parkinsonism including bradykinesia or rigidity. Walks briskly.  His exam and history are otherwise more consistent w essential tremor.   Does endorse maybe some slight hyposmia and maybe some dream enactment? And thus should be monitored for emerging parkinsonism. Discussed clinical monitoring and possibility of Ashtyn scan in future.   Also encouraged him to let his recently diagnosed sisters about PDGeneration study, which would allow for free genetic screening for markers for PD, offered her at  or also [facility] if this is where they get their care.   RTC in 9-12 months. All questions were answered.

## 2024-04-15 NOTE — PROGRESS NOTES
Subjective     Demetrio Malave is a right handed  68 y.o. year old male who presents with Tremors (NPV FOR TREMORS). Patient is accompanied by: spouse  Visit type: new patient visit     Tremor of hands for many years, maybe 30 years back, has gradually worsened over the last few years, more so.  Was mostly in R hand but has progressed to involve the L hand. Tremor is mostly w fine motor skills, drinking something or eating something. Slicing is difficult.   Tries to stay away from soups etc. Tremor gets worse when excited or nervous. No clear etoh response, but does not drink much at all. Raymundo coffee cup up full. Shaves w normal razor. When was working sometimes had to hold w both hands, to prevent shaking.   No voice, head or leg tremor.     Mother had PD, oldest brother had PD and dementia (diagnosed around late 50s, early 60s, and passed away at 73). Two older sisters have PD, at age 78, and 73.   He is number 8 of 12 siblings.   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   Stiffness/rigidity: feels stiff and has some body aches also has arthritis  Slowness:  little slow when first gets up from being seated for a while.   Trouble walking: no shuffling, can keep up w pairs.   Freezing of gait:  denies  Balance problems:  mild dizziness due to BP issues.   Falls:  denies  Changes in speech:  no change.   Swallowing difficulties:  denies  Abnormal postures:  denies  Handwriting: most of time okay, if nervous it is hard, messy, no micrographia  Activities of daily living (buttoning clothes, bathing, cutting food, etc):  independent    Review of Non-Motor symptoms:  Cognition:  Memory- some changes, can forget names  of thing. Wife does not have concerns apart from being a little slower.          Hallucinations-  denies        Mood:        Depression-  denies                       Anxiety: denies  Sleep disturbances including REM behavior disorder: broken sleep, gets 5-7 hours per night, does fling out as part of dreams sometimes.   Sensory changes (ie, smell or taste):  ? Hyposmia, states he does not smell things as acutely as his wife.   Cramps/pain:  adeel horses at times  Gastrointestinal complaints/Constipation:  denies  Urinary retention or frequency:  frequency, especially at night.   Positional lightheadedness and/or syncope:  dizzy when stands up. Subsides quickly.   Excessive saliva/drooling:  denies.  Fatigue:  denies    Patient Active Problem List   Diagnosis    Abnormal computed tomography angiography of heart    Actinic keratosis    Other seborrheic keratosis    Arthritis    BPH associated with nocturia    Benign essential hypertension    CAD (coronary artery disease)    Chest pain    Chronic rhinitis    Chronic throat clearing    Cluster headaches    Colon polyps    Epidermal cyst    Epididymitis    GERD without esophagitis    Hemangioma of skin and subcutaneous tissue    Hyperlipidemia    Infected sebaceous cyst    Neoplasm of uncertain behavior of skin    Nocturnal polyuria    Obesity, Class I, BMI 30-34.9    Obstructive sleep apnea    Occasional tremors    Other hypertrophic disorders of the skin    Other viral warts    Poikiloderma of [place]    Rash and other nonspecific skin eruption    Renal cyst, right    Right groin pain    Urinary frequency    Ureteral calculus      Past Medical History:   Diagnosis Date    Prostatitis       Past Surgical History:   Procedure Laterality Date    CT ANGIO CORONARY ART WITH HEARTFLOW IF SCORE >30%  2/22/2023    CT HEART CORONARY ANGIOGRAM [id number] CT    HERNIA REPAIR  02/16/2017    Hernia Repair    OTHER SURGICAL HISTORY  12/17/2018    Shoulder surgery      Social  History     Socioeconomic History    Marital status:      Spouse name: Not on file    Number of children: Not on file    Years of education: Not on file    Highest education level: Not on file   Occupational History    Not on file   Tobacco Use    Smoking status: Former     Types: Cigars     Quit date:      Years since quittin.3    Smokeless tobacco: Never   Substance and Sexual Activity    Alcohol use: Not Currently    Drug use: Never    Sexual activity: Not on file   Other Topics Concern    Not on file   Social History Narrative    Not on file     Social Determinants of Health     Financial Resource Strain: Not on file   Food Insecurity: Not on file   Transportation Needs: Not on file   Physical Activity: Not on file   Stress: Not on file   Social Connections: Not on file   Intimate Partner Violence: Not on file   Housing Stability: Not on file      Family History   Problem Relation Name Age of Onset    Leukemia Father      Colon cancer Father        Patient Health Questionnaire-2 Score: 0        No Known Allergies       Current Outpatient Medications on File Prior to Visit   Medication Sig Dispense Refill    amLODIPine (Norvasc) 5 mg tablet Take 1 tablet (5 mg) by mouth once daily. 90 tablet 3    aspirin 81 mg chewable tablet Chew 1 tablet (81 mg) once daily. 90 tablet 3    atorvastatin (Lipitor) 40 mg tablet Take 1 tablet (40 mg) by mouth once daily. 90 tablet 3    isosorbide mononitrate ER (Imdur) 30 mg 24 hr tablet Take 1 tablet (30 mg) by mouth once every 24 hours. 90 tablet 3    mometasone (Elocon) 0.1 % ointment Apply topically once daily. 45 g 2    nitroglycerin (Nitrostat) 0.4 mg SL tablet as directed Sublingual      [DISCONTINUED] naloxone (Narcan) 4 mg/0.1 mL nasal spray Administer 1 spray (4 mg) into affected nostril(s) if needed for opioid reversal or respiratory depression for up to 1 dose. May repeat every 2-3 minutes if needed, alternating nostrils, until medical assistance becomes  available. (Patient not taking: Reported on 4/15/2024) 2 each 0     No current facility-administered medications on file prior to visit.        Review of Systems  All other system have been reviewed and are negative for complaint.  Objective   Vitals:    04/15/24 1330   BP: 115/72   Pulse: 63      Neurological Exam  Mental Status  Awake, alert and oriented to person, place and time.    Cranial Nerves  CN II: Visual fields full to confrontation.  CN III, IV, VI: Extraocular movements intact bilaterally. Pupils equal round and reactive to light bilaterally.  CN V: Facial sensation is normal.  CN VII: Full and symmetric facial movement.  CN VIII: Hearing is normal.  CN IX, X: Palate elevates symmetrically    Motor  Normal muscle bulk throughout. Strength is 5/5 throughout all four extremities.    Sensory  Light touch is normal in upper and lower extremities. Pinprick is normal in upper and lower extremities. Vibration is normal in upper and lower extremities.     Reflexes                                            Right                      Left  Brachioradialis                    2+                         2+  Biceps                                                         2+  Triceps                                2+                         2+  Patellar                                3+                         3+  Achilles                                2+                         2+  Right Plantar: downgoing  Left Plantar: downgoing    Coordination  Right: Finger-to-nose normal. Rapid alternating movement normal. Heel-to-shin normal.Left: Finger-to-nose normal. Rapid alternating movement normal. Heel-to-shin normal.    Gait  Normal casual, toe, heel and tandem gait.        MDS UPDRS 1st Score: Motor Examination  Is the patient on Levodopa?: No  Speech: 1  Facial Expression: 0  Rigidty Neck: 0  Rigidty RUE: 0  Rigidity - LUE: 0  Rigidity RLE: 0  Rigidity LLE: 0  Finger Tapping Right Hand: 0  Finger Tapping Left Hand:  0  Hand Movements- Right Hand: 0  Hand Movements- Left Hand: 0  Pronatiaon-Supination Movments - Right Hand: 0  Pronatiaon-Supination Movments Left Hand: 0  Toe Tapping Right Foot: 0  Toe Tapping - Left Foot: 1  Leg Agility - Right Le  Leg Agility - Left le  Arising from Chair: 0  Gait: 0  Freezing of Gait: 0  Postural Stability: 0  Posture: 0  Global Spontanteity of Movment ( Body Bradykinesia): 0  Postural Tremor - Right Hand: 1  Postural Tremor - Left hand: 0  Kinetic Tremor - Right hand: 1  Kinetic Tremor - Left hand: 1  Rest Tremor Amplitude - RUE: 0  Rest Tremor Amplitude - LUE: 0  Rest Tremor Amplitude - RLE: 0  Rest Tremor Amplitude - LLE: 0  Rest Tremor Amplitude - Lip/Jaw: 0  Constancy of Rest Tremor: 0  MDS UPDRS Total Score: 5      [facility] T[facility] scale:   PART A Face at rest: 0, Face at posture: 0, Face Total: 0, Tongue at rest: 0, Tongue at posture: 0, Tongue total: 0, Voice in action: 0, Voice total: 0, Head at rest: 0, Head at posture: 0, Head total: 0, RUE at rest: 0, RUE at posture: 1, RUE in action: 1, RUE total: 2, LUE at rest: 0, LUE at posture: 0, LUE in action: 1, RUE total: 1, Trunk at rest: 0, Trunk at posture: 0, Trunk total: 0, RLE at rest: 0, RLE at posture: 0, RLE in action: 0, RLE total: 0, LLE at rest: 0, LLE at posture: 0, LLE in action: 0, LLE total: 0, Part A subtotal: 3 PART B Handwriting dominant: 0, Drawing A - Right: 1, Drawing A - Left: 0, Drawing A total: 1, Drawing B - Right: 2, Drawing B - Left: 0, Drawing B total: 2, Drawing C - Right: 1, Drawing C - Left: 0, Drawing C total: 1         Hemoglobin A1C   Date Value Ref Range Status   10/25/2023 5.2 see below % Final     Estimated Average Glucose   Date Value Ref Range Status   10/25/2023 103 Not Established mg/dL Final     Thyroid Stimulating Hormone   Date Value Ref Range Status   10/25/2023 2.16 0.44 - 3.98 mIU/L Final           Assessment/Plan       Demetrio SIERRA Ananda is a 68 y.o. year old male here for  evaluation of tremor, which is longstanding, and occurs primarily with fine motor tasks. He has a strong family hx of PD in his mother, and now 3 of his 11 siblings (12 children total). His exam shows a very slight kinetic tremor on FNF bilaterally and L hand w posture, but no significant parkinsonism including bradykinesia or rigidity. Walks briskly.  His exam and history are otherwise more consistent w essential tremor.   Does endorse maybe some slight hyposmia and maybe some dream enactment? And thus should be monitored for emerging parkinsonism. Discussed clinical monitoring and possibility of Ashtyn scan in future.   Also encouraged him to let his recently diagnosed sisters about PDGeneration study, which would allow for free genetic screening for markers for PD, offered her at  or also [facility] if this is where they get their care.   RTC in 9-12 months. All questions were answered.

## 2024-04-22 ENCOUNTER — OFFICE VISIT (OUTPATIENT)
Dept: CARDIOLOGY | Facility: CLINIC | Age: 69
End: 2024-04-22
Payer: MEDICARE

## 2024-04-22 VITALS
WEIGHT: 213.6 LBS | DIASTOLIC BLOOD PRESSURE: 82 MMHG | HEIGHT: 67 IN | HEART RATE: 61 BPM | SYSTOLIC BLOOD PRESSURE: 124 MMHG | BODY MASS INDEX: 33.53 KG/M2

## 2024-04-22 DIAGNOSIS — G47.33 OBSTRUCTIVE SLEEP APNEA: ICD-10-CM

## 2024-04-22 DIAGNOSIS — I10 BENIGN ESSENTIAL HYPERTENSION: ICD-10-CM

## 2024-04-22 DIAGNOSIS — Z87.891 FORMER SMOKER: ICD-10-CM

## 2024-04-22 DIAGNOSIS — E78.2 MIXED HYPERLIPIDEMIA: Primary | ICD-10-CM

## 2024-04-22 DIAGNOSIS — I25.10 CORONARY ARTERY DISEASE INVOLVING NATIVE CORONARY ARTERY OF NATIVE HEART, UNSPECIFIED WHETHER ANGINA PRESENT: ICD-10-CM

## 2024-04-22 PROCEDURE — 1160F RVW MEDS BY RX/DR IN RCRD: CPT | Performed by: INTERNAL MEDICINE

## 2024-04-22 PROCEDURE — 99214 OFFICE O/P EST MOD 30 MIN: CPT | Performed by: INTERNAL MEDICINE

## 2024-04-22 PROCEDURE — 3079F DIAST BP 80-89 MM HG: CPT | Performed by: INTERNAL MEDICINE

## 2024-04-22 PROCEDURE — 1159F MED LIST DOCD IN RCRD: CPT | Performed by: INTERNAL MEDICINE

## 2024-04-22 PROCEDURE — 3008F BODY MASS INDEX DOCD: CPT | Performed by: INTERNAL MEDICINE

## 2024-04-22 PROCEDURE — 3074F SYST BP LT 130 MM HG: CPT | Performed by: INTERNAL MEDICINE

## 2024-04-22 NOTE — PROGRESS NOTES
CARDIOLOGY OFFICE VISIT      CHIEF COMPLAINT  Chief Complaint   Patient presents with    Patient here for a 1 year follow up        HISTORY OF PRESENT ILLNESS  Patient is 68-year-old  male with past medical history significant for coronary artery disease, hypertension, hyperlipidemia, obstructive sleep apnea, BPH who presents for follow-up cardiovascular care.     Patient has infrequent brief chest pain unrelated to activity.  Denies dyspnea, palpitations, nausea, vomiting.  Occasional dizziness standing up without near syncope or manohar syncope.  Denies edema.  Currently drinks 34-64 ounces water daily.  Was seen in the ER for abdominal pain that lasted 2 days.  He had a CT that identified diverticulosis and cholelithiasis.  He is also had issues with nephrolithiasis.  He did not have follow-up with gastroenterology after this episode.  Patient has previously seen Dr. Jackson. He walks 3 times a week for 1 hour.     Past medical history:  As above     Past surgical history:  Right upper chest vascular trauma with repair 1975  Hernia repair x3     Social history:  Patient remotely smokes cigars quit 40 years ago  Rare alcohol use     Family history:  Father history of myocardial infarction  at age 72 from complications of diabetes and leukemia  Mother  in her late 80s     Review of systems have been reviewed and are negative, noncontributory, or as previous mentioned x12 systems.     I personally reviewed EKG 2023: Normal sinus rhythm     Assessment:  Coronary artery disease 50% LAD, 50% right PDA on cath 2023  Hypertension  Hyperlipidemia  Obstructive sleep apnea  BPH  For lithiasis  Cholelithiasis  Diverticulosis     Recommendations:  Patient appears to be stable from a cardiac standpoint. Symptoms have improved with medical therapy. No severe obstructive coronary artery disease on recent cardiac cath. Continue medical therapy as prescribed.  No evidence of heart failure.   No symptomatic arrhythmia.  I advised that the patient follow-up with his gastroenterologist to get input of recent abdominal pain and cholelithiasis identified on CT abdomen.  Advise diet, weight loss, exercise program 30 minutes/day. Increase water intake to 64 ounces per day. Follow-up in 1 year. Check CMP, lipid profile at that time.     Please excuse any errors in grammar or translation related to this dictation. Voice recognition software was utilized to prepare this document.     Recent Cardiovascular Testing:  The following results have been reviewed and updated.   Echo 1/30/2023  EF 60-65%  Negative Bubble Study     CTA Coronaries 2/21/23  Significant stenosis of mesfin mid right posterior descending artery with mixed plaque. Mid PDA FFR 0.73  LAD 50-70% luminal stenosis. Mid LAD FFR 0.86  Coronary Artery Calcium Score 228, 67% percentile      [facility] 3/2/23  1. Mid LAD 50% stenosis  2. Mid PDA RCA 50% stenosis  3. EF 55%      VITALS  Vitals:    04/22/24 1139   BP: 124/82   Pulse: 61     Wt Readings from Last 4 Encounters:   04/22/24 96.9 kg (213 lb 9.6 oz)   04/15/24 97.8 kg (215 lb 11.2 oz)   03/20/24 93 kg (205 lb)   02/19/24 97.1 kg (214 lb 1.1 oz)       PHYSICAL EXAM:  GENERAL:  Well developed, well nourished, in no acute distress.  CHEST:  Symmetric and nontender.  NEURO/PSYCH:  Alert and oriented times three with approppriate behavior and responses.  NECK:  Supple, no JVD, no bruit.  LUNGS:  Clear to auscultation bilaterally, normal respiratory effort.  HEART:  Rate and rhythm regularly with no evident murmur, no gallop appreciated.    There are no rubs, clicks or heaves.  EXTREMITIES:  Warm with good color, no clubbing or cyanosis.  There is no edema noted.  PERIPHERAL VASCULAR:  Pulses present and equally palpable; 2+ throughout.    ASSESSMENT AND PLAN  Diagnoses and all orders for this visit:  Mixed hyperlipidemia  -     Lipid panel; Future  Coronary artery disease involving native coronary artery of  native heart, unspecified whether angina present  -     Comprehensive metabolic panel; Future  Benign essential hypertension  Obstructive sleep apnea  BMI 33.0-33.9,adult  Former smoker      Past Medical History  Past Medical History:   Diagnosis Date    Prostatitis        Social History  Social History     Tobacco Use    Smoking status: Former     Types: Cigars     Quit date:      Years since quittin.3    Smokeless tobacco: Never   Substance Use Topics    Alcohol use: Not Currently    Drug use: Never       Family History     Family History   Problem Relation Name Age of Onset    Leukemia Father      Colon cancer Father          Allergies:  No Known Allergies     Outpatient Medications:  Current Outpatient Medications   Medication Instructions    amLODIPine (NORVASC) 5 mg, oral, Daily    aspirin 81 mg, oral, Daily    atorvastatin (LIPITOR) 40 mg, oral, Daily    isosorbide mononitrate ER (IMDUR) 30 mg, oral, Every 24 hours    mometasone (Elocon) 0.1 % ointment Topical, Daily    nitroglycerin (Nitrostat) 0.4 mg SL tablet as directed Sublingual        Recent Lab Results:    CMP:    Lab Results   Component Value Date     2024    K 4.2 2024     2024    CO2 30 2024    BUN 18 2024    CREATININE 0.87 2024    GLUCOSE 88 2024    CALCIUM 8.9 2024       Magnesium:    Lab Results   Component Value Date    MG 2.15 2024       Lipid Profile:    Lab Results   Component Value Date    TRIG 62 2024    HDL 36.9 2024    LDLCALC 40 2024       TSH:    Lab Results   Component Value Date    TSH 2.16 10/25/2023       BNP:   Lab Results   Component Value Date    BNP 25 2024        PT/INR:    Lab Results   Component Value Date    PROTIME 11.8 2023    INR 1.0 2023       HgBA1c:    Lab Results   Component Value Date    HGBA1C 5.2 10/25/2023       BMP:  Lab Results   Component Value Date     2024     2024      10/25/2023    K 4.2 03/14/2024    K 3.9 02/02/2024    K 4.5 10/25/2023     03/14/2024     02/02/2024     10/25/2023    CO2 30 03/14/2024    CO2 27 02/02/2024    CO2 30 10/25/2023    BUN 18 03/14/2024    BUN 14 02/02/2024    BUN 16 10/25/2023    CREATININE 0.87 03/14/2024    CREATININE 0.87 02/02/2024    CREATININE 0.88 10/25/2023       CBC:  Lab Results   Component Value Date    WBC 7.0 02/02/2024    WBC 5.6 10/25/2023    WBC 7.0 05/02/2023    WBC 7.0 04/11/2023    WBC 7.8 02/27/2023    RBC 5.13 02/02/2024    RBC 4.79 10/25/2023    RBC 4.56 05/02/2023    RBC 4.46 (L) 04/11/2023    RBC 4.66 02/27/2023    HGB 15.4 02/02/2024    HGB 14.1 10/25/2023    HGB 13.8 05/02/2023    HGB 13.4 (L) 04/11/2023    HGB 13.9 02/27/2023    HCT 45.6 02/02/2024    HCT 43.9 10/25/2023    HCT 42.0 05/02/2023    HCT 40.0 (L) 04/11/2023    HCT 42.5 02/27/2023    MCV 89 02/02/2024    MCV 92 10/25/2023    MCV 92 05/02/2023    MCV 90 04/11/2023    MCV 91 02/27/2023    MCH 30.0 02/02/2024    MCH 29.4 10/25/2023    MCHC 33.8 02/02/2024    MCHC 32.1 10/25/2023    MCHC 32.9 05/02/2023    MCHC 33.5 04/11/2023    MCHC 32.7 02/27/2023    RDW 11.9 02/02/2024    RDW 11.9 10/25/2023    RDW 11.9 05/02/2023    RDW 11.8 04/11/2023    RDW 11.9 02/27/2023     02/02/2024     10/25/2023     05/02/2023     04/11/2023     02/27/2023    MPV 9.6 10/25/2023       Cardiac Enzymes:    Lab Results   Component Value Date    TROPHS 5 02/02/2024    TROPHS 5 02/02/2024    TROPHS 4 04/11/2023       Hepatic Function Panel:    Lab Results   Component Value Date    ALKPHOS 58 03/14/2024    ALT 34 03/14/2024    AST 27 03/14/2024    PROT 6.3 (L) 03/14/2024    BILITOT 1.4 (H) 03/14/2024           Darrell Durand DO

## 2024-04-22 NOTE — PATIENT INSTRUCTIONS
FOLLOW UP WITH YOUR GI DOCTOR FOR YOUR STOMACH PAIN    FOLLOW UP IN 1 YEAR    OBTAIN LAB WORK PRIOR TO THIS VISIT, THIS WILL BE FASTING

## 2024-04-25 ENCOUNTER — APPOINTMENT (OUTPATIENT)
Dept: RADIOLOGY | Facility: HOSPITAL | Age: 69
End: 2024-04-25
Payer: MEDICARE

## 2024-05-28 ENCOUNTER — OFFICE VISIT (OUTPATIENT)
Dept: ORTHOPEDIC SURGERY | Facility: CLINIC | Age: 69
End: 2024-05-28
Payer: MEDICARE

## 2024-05-28 ENCOUNTER — HOSPITAL ENCOUNTER (OUTPATIENT)
Dept: RADIOLOGY | Facility: CLINIC | Age: 69
Discharge: HOME | End: 2024-05-28
Payer: MEDICARE

## 2024-05-28 DIAGNOSIS — M24.851 SNAPPING HIP SYNDROME, RIGHT: ICD-10-CM

## 2024-05-28 DIAGNOSIS — M54.40 ACUTE LOW BACK PAIN WITH SCIATICA, SCIATICA LATERALITY UNSPECIFIED, UNSPECIFIED BACK PAIN LATERALITY: ICD-10-CM

## 2024-05-28 DIAGNOSIS — M25.551 PAIN OF RIGHT HIP: ICD-10-CM

## 2024-05-28 DIAGNOSIS — S76.019A HIP STRAIN, INITIAL ENCOUNTER: ICD-10-CM

## 2024-05-28 PROCEDURE — 99214 OFFICE O/P EST MOD 30 MIN: CPT | Performed by: FAMILY MEDICINE

## 2024-05-28 PROCEDURE — 73502 X-RAY EXAM HIP UNI 2-3 VIEWS: CPT | Mod: RT

## 2024-05-28 PROCEDURE — 1160F RVW MEDS BY RX/DR IN RCRD: CPT | Performed by: FAMILY MEDICINE

## 2024-05-28 PROCEDURE — 72100 X-RAY EXAM L-S SPINE 2/3 VWS: CPT | Performed by: FAMILY MEDICINE

## 2024-05-28 PROCEDURE — 1036F TOBACCO NON-USER: CPT | Performed by: FAMILY MEDICINE

## 2024-05-28 PROCEDURE — 72100 X-RAY EXAM L-S SPINE 2/3 VWS: CPT

## 2024-05-28 PROCEDURE — 73502 X-RAY EXAM HIP UNI 2-3 VIEWS: CPT | Mod: RIGHT SIDE | Performed by: FAMILY MEDICINE

## 2024-05-28 PROCEDURE — 99204 OFFICE O/P NEW MOD 45 MIN: CPT | Performed by: FAMILY MEDICINE

## 2024-05-28 PROCEDURE — 3008F BODY MASS INDEX DOCD: CPT | Performed by: FAMILY MEDICINE

## 2024-05-28 PROCEDURE — 1159F MED LIST DOCD IN RCRD: CPT | Performed by: FAMILY MEDICINE

## 2024-05-28 RX ORDER — METHYLPREDNISOLONE 4 MG/1
TABLET ORAL
Qty: 1 EACH | Refills: 0 | Status: SHIPPED | OUTPATIENT
Start: 2024-05-28

## 2024-05-28 RX ORDER — NAPROXEN 500 MG/1
500 TABLET ORAL
Qty: 28 TABLET | Refills: 0 | Status: SHIPPED | OUTPATIENT
Start: 2024-05-28 | End: 2024-06-11

## 2024-05-29 NOTE — PROGRESS NOTES
Acute Injury New Patient Visit    CC:   Chief Complaint   Patient presents with    Right Hip - Pain    Lower Back - Pain       HPI: Demetrio is a 68 y.o.male who presents today with new complaints of no significant recent injury or trauma.  Has right-sided lateral hip and low back pain and discomfort.  He presents here today for further evaluation, at times he feels like his hip is weak and gives out often clicking and popping mostly with lateral sided discomfort.  He did state he noticed tingling or burning.  Denies any bowel or bladder incontinence.  No accident injury or trauma that he can recall.        Review of Systems   GENERAL: Negative for malaise, significant weight loss, fever  MUSCULOSKELETAL: See HPI  NEURO: Negative for numbness / tingling     Past Medical History  Past Medical History:   Diagnosis Date    Coronary artery disease     Hyperlipidemia     Hypertension     Prostatitis        Medication review  Medication Documentation Review Audit       Reviewed by Cole C Budinsky, MD (Physician) on 05/28/24 at 2221      Medication Order Taking? Sig Documenting Provider Last Dose Status   amLODIPine (Norvasc) 5 mg tablet [id number] No Take 1 tablet (5 mg) by mouth once daily. Rizwan Norris, DO Taking Active   aspirin 81 mg chewable tablet [id number] No Chew 1 tablet (81 mg) once daily. Rizwan Norris, DO Taking Active   atorvastatin (Lipitor) 40 mg tablet [id number] No Take 1 tablet (40 mg) by mouth once daily. Rizwan Norris, DO Taking Active   isosorbide mononitrate ER (Imdur) 30 mg 24 hr tablet [id number] No Take 1 tablet (30 mg) by mouth once every 24 hours. Rizwan Norris, DO Taking Active   methylPREDNISolone (Medrol Dospak) 4 mg tablets [id number]  Follow schedule on package instructions Cole C Budinsky, MD  Active   mometasone (Elocon) 0.1 % ointment [id number] No Apply topically once daily. Rizwan Norris DO Taking Active   naproxen (Naprosyn) 500 mg tablet [id number]  Take 1 tablet (500 mg) by  mouth 2 times daily (morning and late afternoon) for 14 days. Cole C Budinsky, MD  Active   nitroglycerin (Nitrostat) 0.4 mg SL tablet [id number] No as directed Sublingual Historical Provider, MD Taking Active                    Allergies  No Known Allergies    Social History  Social History     Socioeconomic History    Marital status:      Spouse name: Not on file    Number of children: Not on file    Years of education: Not on file    Highest education level: Not on file   Occupational History    Not on file   Tobacco Use    Smoking status: Former     Types: Cigars     Quit date:      Years since quittin.4    Smokeless tobacco: Never   Substance and Sexual Activity    Alcohol use: Not Currently    Drug use: Never    Sexual activity: Defer   Other Topics Concern    Not on file   Social History Narrative    Not on file     Social Determinants of Health     Financial Resource Strain: Not on file   Food Insecurity: Not on file   Transportation Needs: Not on file   Physical Activity: Not on file   Stress: Not on file   Social Connections: Not on file   Intimate Partner Violence: Not on file   Housing Stability: Not on file       Surgical History  Past Surgical History:   Procedure Laterality Date    CT ANGIO CORONARY ART WITH HEARTFLOW IF SCORE >30%  2023    CT HEART CORONARY ANGIOGRAM ELY [id number] CT    HERNIA REPAIR  2017    Hernia Repair    OTHER SURGICAL HISTORY  2018    Shoulder surgery       Physical Exam:  GENERAL:  Patient is awake, alert, and oriented to person place and time.  Patient appears well nourished and well kept.  Affect Calm, Not Acutely Distressed.  HEENT:  Normocephalic, Atraumatic, EOMI  CARDIOVASCULAR:  Hemodynamically stable.  RESPIRATORY:  Normal respirations with unlabored breathing.  NEURO: Gross sensation intact to the lower extremities bilaterally.  Extremity: Right hip exam demonstrates negative logroll mild soft tissue tenderness over the lateral aspect of  the soft tissues proximal to the greater trochanter bursa.  No significant bursa pain no significant IT band pain full intact extensor mechanism 5 out of 5 straight leg extension and hip flexion abduction and adduction.  Normal internal and external rotation about the bilateral hips.  Bilateral calves are soft and nontender.      Diagnostics: No significant osteoarthritic changes, no presence for fracture or dislocation about the hip or pelvis        Procedure: None  Procedures    Assessment:   Problem List Items Addressed This Visit    None  Visit Diagnoses       Acute low back pain with sciatica, sciatica laterality unspecified, unspecified back pain laterality        Relevant Medications    methylPREDNISolone (Medrol Dospak) 4 mg tablets    naproxen (Naprosyn) 500 mg tablet    Other Relevant Orders    XR lumbar spine 2-3 views    Referral to Physical Therapy    Pain of right hip        Relevant Medications    methylPREDNISolone (Medrol Dospak) 4 mg tablets    naproxen (Naprosyn) 500 mg tablet    Other Relevant Orders    XR hip right with pelvis when performed 2 or 3 views    Referral to Physical Therapy    Hip strain, initial encounter        Relevant Medications    methylPREDNISolone (Medrol Dospak) 4 mg tablets    naproxen (Naprosyn) 500 mg tablet    Other Relevant Orders    Referral to Physical Therapy    Snapping hip syndrome, right        Relevant Medications    methylPREDNISolone (Medrol Dospak) 4 mg tablets    naproxen (Naprosyn) 500 mg tablet    Other Relevant Orders    Referral to Physical Therapy             Plan: Discussed with the patient no evidence of obvious avulsion injury or significant bony abnormality, will consider physical therapy oral steroid anti-inflammatory going forward.  Should there be any worsening issues or concerns we will consider further advanced imaging.  We could also consider potential soft tissue injection.  No need for repeat x-rays at follow-up.  Orders Placed This Encounter     XR hip right with pelvis when performed 2 or 3 views    XR lumbar spine 2-3 views    Referral to Physical Therapy    methylPREDNISolone (Medrol Dospak) 4 mg tablets    naproxen (Naprosyn) 500 mg tablet      At the conclusion of the visit there were no further questions by the patient/family regarding their plan of care.  Patient was instructed to call or return with any issues, questions, or concerns regarding their injury and/or treatment plan described above.     05/28/24 at 10:25 PM - Cole C Budinsky, MD    Office: [phone]    This note was prepared using voice recognition software.  The details of this note are correct and have been reviewed, and corrected to the best of my ability.  Some grammatical errors may persist related to the Dragon software.

## 2024-07-10 ENCOUNTER — APPOINTMENT (OUTPATIENT)
Dept: ORTHOPEDIC SURGERY | Facility: CLINIC | Age: 69
End: 2024-07-10
Payer: MEDICARE

## 2024-10-10 ENCOUNTER — LAB (OUTPATIENT)
Dept: LAB | Facility: LAB | Age: 69
End: 2024-10-10
Payer: MEDICARE

## 2024-10-10 DIAGNOSIS — Z12.5 SCREENING PSA (PROSTATE SPECIFIC ANTIGEN): ICD-10-CM

## 2024-10-10 LAB — PSA SERPL-MCNC: 0.87 NG/ML

## 2024-10-10 PROCEDURE — G0103 PSA SCREENING: HCPCS

## 2024-10-21 ENCOUNTER — HOSPITAL ENCOUNTER (OUTPATIENT)
Dept: RADIOLOGY | Facility: HOSPITAL | Age: 69
Discharge: HOME | End: 2024-10-21
Payer: MEDICARE

## 2024-10-21 DIAGNOSIS — N28.1 RENAL CYST: ICD-10-CM

## 2024-10-21 PROCEDURE — 74176 CT ABD & PELVIS W/O CONTRAST: CPT

## 2024-10-25 ENCOUNTER — APPOINTMENT (OUTPATIENT)
Dept: PRIMARY CARE | Facility: CLINIC | Age: 69
End: 2024-10-25
Payer: MEDICARE

## 2024-10-25 VITALS
BODY MASS INDEX: 35.2 KG/M2 | HEART RATE: 62 BPM | SYSTOLIC BLOOD PRESSURE: 126 MMHG | DIASTOLIC BLOOD PRESSURE: 80 MMHG | RESPIRATION RATE: 17 BRPM | OXYGEN SATURATION: 98 % | TEMPERATURE: 97 F | HEIGHT: 66 IN | WEIGHT: 219 LBS

## 2024-10-25 DIAGNOSIS — R73.9 HYPERGLYCEMIA: ICD-10-CM

## 2024-10-25 DIAGNOSIS — N40.1 BPH ASSOCIATED WITH NOCTURIA: ICD-10-CM

## 2024-10-25 DIAGNOSIS — Z00.00 MEDICARE ANNUAL WELLNESS VISIT, SUBSEQUENT: Primary | ICD-10-CM

## 2024-10-25 DIAGNOSIS — R35.1 BPH ASSOCIATED WITH NOCTURIA: ICD-10-CM

## 2024-10-25 DIAGNOSIS — L40.9 PSORIASIS: ICD-10-CM

## 2024-10-25 DIAGNOSIS — I25.10 CORONARY ARTERY DISEASE INVOLVING NATIVE CORONARY ARTERY OF NATIVE HEART, UNSPECIFIED WHETHER ANGINA PRESENT: ICD-10-CM

## 2024-10-25 PROCEDURE — 99214 OFFICE O/P EST MOD 30 MIN: CPT | Performed by: FAMILY MEDICINE

## 2024-10-25 PROCEDURE — G0439 PPPS, SUBSEQ VISIT: HCPCS | Performed by: FAMILY MEDICINE

## 2024-10-25 RX ORDER — AMLODIPINE BESYLATE 5 MG/1
5 TABLET ORAL DAILY
Qty: 90 TABLET | Refills: 3 | Status: SHIPPED | OUTPATIENT
Start: 2024-10-25

## 2024-10-25 RX ORDER — ISOSORBIDE MONONITRATE 30 MG/1
30 TABLET, EXTENDED RELEASE ORAL EVERY 24 HOURS
Qty: 90 TABLET | Refills: 3 | Status: SHIPPED | OUTPATIENT
Start: 2024-10-25

## 2024-10-25 RX ORDER — NAPROXEN SODIUM 220 MG/1
81 TABLET, FILM COATED ORAL DAILY
Qty: 90 TABLET | Refills: 3 | Status: SHIPPED | OUTPATIENT
Start: 2024-10-25

## 2024-10-25 RX ORDER — NITROGLYCERIN 0.4 MG/1
0.4 TABLET SUBLINGUAL EVERY 5 MIN PRN
Qty: 30 TABLET | Refills: 2 | Status: SHIPPED | OUTPATIENT
Start: 2024-10-25 | End: 2025-10-25

## 2024-10-25 RX ORDER — MOMETASONE FUROATE 1 MG/G
OINTMENT TOPICAL DAILY
Qty: 45 G | Refills: 3 | Status: SHIPPED | OUTPATIENT
Start: 2024-10-25 | End: 2025-10-25

## 2024-10-25 RX ORDER — ATORVASTATIN CALCIUM 40 MG/1
40 TABLET, FILM COATED ORAL DAILY
Qty: 90 TABLET | Refills: 3 | Status: SHIPPED | OUTPATIENT
Start: 2024-10-25

## 2024-10-25 ASSESSMENT — ACTIVITIES OF DAILY LIVING (ADL)
BATHING: INDEPENDENT
DRESSING: INDEPENDENT
DOING_HOUSEWORK: INDEPENDENT
MANAGING_FINANCES: INDEPENDENT
GROCERY_SHOPPING: INDEPENDENT
TAKING_MEDICATION: INDEPENDENT

## 2024-10-25 ASSESSMENT — ENCOUNTER SYMPTOMS
NUMBNESS: 0
WEAKNESS: 0
MYALGIAS: 0
POLYDIPSIA: 0
VOMITING: 0
ARTHRALGIAS: 0
FREQUENCY: 0
DIARRHEA: 0
POLYPHAGIA: 0
FATIGUE: 0
NAUSEA: 0
HEADACHES: 0
DIZZINESS: 0
SHORTNESS OF BREATH: 0
APPETITE CHANGE: 0
CHEST TIGHTNESS: 0

## 2024-10-25 ASSESSMENT — PATIENT HEALTH QUESTIONNAIRE - PHQ9
2. FEELING DOWN, DEPRESSED OR HOPELESS: NOT AT ALL
1. LITTLE INTEREST OR PLEASURE IN DOING THINGS: NOT AT ALL
SUM OF ALL RESPONSES TO PHQ9 QUESTIONS 1 AND 2: 0

## 2024-10-25 NOTE — ASSESSMENT & PLAN NOTE
Orders:    amLODIPine (Norvasc) 5 mg tablet; Take 1 tablet (5 mg) by mouth once daily.    aspirin 81 mg chewable tablet; Chew 1 tablet (81 mg) once daily.

## 2024-10-25 NOTE — PROGRESS NOTES
"Subjective   Reason for Visit: Demetrio Malave is an 68 y.o. male here for a Medicare Wellness visit.     Past Medical, Surgical, and Family History reviewed and updated in chart.    Reviewed all medications by prescribing practitioner or clinical pharmacist (such as prescriptions, OTCs, herbal therapies and supplements) and documented in the medical record.    HPI    Patient Care Team:  Rizwan Norris DO as PCP - General  Darrell Durand DO as PCP - [facility] Attributed Provider     Review of Systems   Constitutional:  Negative for appetite change and fatigue.   Eyes:  Negative for visual disturbance.   Respiratory:  Negative for chest tightness and shortness of breath.    Cardiovascular:  Negative for chest pain and leg swelling.   Gastrointestinal:  Negative for diarrhea, nausea and vomiting.   Endocrine: Negative for polydipsia, polyphagia and polyuria.   Genitourinary:  Negative for frequency and urgency.   Musculoskeletal:  Negative for arthralgias and myalgias.   Neurological:  Negative for dizziness, syncope, weakness, numbness and headaches.       Objective   Vitals:  /80   Pulse 62   Temp 36.1 °C (97 °F)   Resp 17   Ht 1.676 m (5' 6\")   Wt 99.3 kg (219 lb)   SpO2 98%   BMI 35.35 kg/m²       Physical Exam  Constitutional:       Appearance: Normal appearance.   HENT:      Head: Normocephalic.      Right Ear: Tympanic membrane, ear canal and external ear normal.      Left Ear: Tympanic membrane, ear canal and external ear normal.      Nose: Nose normal.      Mouth/Throat:      Mouth: Mucous membranes are moist.      Pharynx: Oropharynx is clear.   Eyes:      Conjunctiva/sclera: Conjunctivae normal.   Cardiovascular:      Rate and Rhythm: Normal rate and regular rhythm.   Pulmonary:      Effort: Pulmonary effort is normal.      Breath sounds: Normal breath sounds.   Musculoskeletal:         General: Normal range of motion.      Cervical back: Neck supple.   Skin:     General: Skin is warm and " dry.   Neurological:      General: No focal deficit present.      Mental Status: He is alert and oriented to person, place, and time.   Psychiatric:         Mood and Affect: Mood normal.         Assessment & Plan  Medicare annual wellness visit, subsequent    Orders:    Follow Up In [facility] - PCP; Future    isosorbide mononitrate ER (Imdur) 30 mg 24 hr tablet; Take 1 tablet (30 mg) by mouth once every 24 hours.    Psoriasis    Orders:    mometasone (Elocon) 0.1 % ointment; Apply topically once daily.    BPH associated with nocturia    Orders:    amLODIPine (Norvasc) 5 mg tablet; Take 1 tablet (5 mg) by mouth once daily.    aspirin 81 mg chewable tablet; Chew 1 tablet (81 mg) once daily.    Hyperglycemia    Orders:    atorvastatin (Lipitor) 40 mg tablet; Take 1 tablet (40 mg) by mouth once daily.    Coronary artery disease involving native coronary artery of native heart, unspecified whether angina present    Orders:    nitroglycerin (Nitrostat) 0.4 mg SL tablet; Place 1 tablet (0.4 mg) under the tongue every 5 minutes if needed for chest pain. as directed

## 2024-10-25 NOTE — ASSESSMENT & PLAN NOTE
Orders:    nitroglycerin (Nitrostat) 0.4 mg SL tablet; Place 1 tablet (0.4 mg) under the tongue every 5 minutes if needed for chest pain. as directed

## 2024-10-28 ENCOUNTER — APPOINTMENT (OUTPATIENT)
Dept: UROLOGY | Facility: CLINIC | Age: 69
End: 2024-10-28
Payer: MEDICARE

## 2024-10-28 VITALS
SYSTOLIC BLOOD PRESSURE: 135 MMHG | WEIGHT: 220.46 LBS | BODY MASS INDEX: 35.43 KG/M2 | HEIGHT: 66 IN | DIASTOLIC BLOOD PRESSURE: 75 MMHG | RESPIRATION RATE: 16 BRPM | HEART RATE: 77 BPM

## 2024-10-28 DIAGNOSIS — N40.1 BPH ASSOCIATED WITH NOCTURIA: Primary | ICD-10-CM

## 2024-10-28 DIAGNOSIS — N45.1 EPIDIDYMITIS: ICD-10-CM

## 2024-10-28 DIAGNOSIS — N40.1 BPH ASSOCIATED WITH NOCTURIA: ICD-10-CM

## 2024-10-28 DIAGNOSIS — R35.1 BPH ASSOCIATED WITH NOCTURIA: ICD-10-CM

## 2024-10-28 DIAGNOSIS — N20.1 URETERAL CALCULUS: ICD-10-CM

## 2024-10-28 DIAGNOSIS — R35.1 BPH ASSOCIATED WITH NOCTURIA: Primary | ICD-10-CM

## 2024-10-28 DIAGNOSIS — N28.1 RENAL CYST: ICD-10-CM

## 2024-10-28 PROCEDURE — 1036F TOBACCO NON-USER: CPT | Performed by: UROLOGY

## 2024-10-28 PROCEDURE — 1123F ACP DISCUSS/DSCN MKR DOCD: CPT | Performed by: UROLOGY

## 2024-10-28 PROCEDURE — 1159F MED LIST DOCD IN RCRD: CPT | Performed by: UROLOGY

## 2024-10-28 PROCEDURE — 3078F DIAST BP <80 MM HG: CPT | Performed by: UROLOGY

## 2024-10-28 PROCEDURE — 3008F BODY MASS INDEX DOCD: CPT | Performed by: UROLOGY

## 2024-10-28 PROCEDURE — 1126F AMNT PAIN NOTED NONE PRSNT: CPT | Performed by: UROLOGY

## 2024-10-28 PROCEDURE — 1160F RVW MEDS BY RX/DR IN RCRD: CPT | Performed by: UROLOGY

## 2024-10-28 PROCEDURE — 3075F SYST BP GE 130 - 139MM HG: CPT | Performed by: UROLOGY

## 2024-10-28 PROCEDURE — 99214 OFFICE O/P EST MOD 30 MIN: CPT | Performed by: UROLOGY

## 2024-10-28 ASSESSMENT — PAIN SCALES - GENERAL: PAINLEVEL_OUTOF10: 0-NO PAIN

## 2024-10-28 ASSESSMENT — ENCOUNTER SYMPTOMS
HEMATURIA: 0
FREQUENCY: 0
DYSURIA: 0
DIFFICULTY URINATING: 0

## 2025-01-11 ENCOUNTER — APPOINTMENT (OUTPATIENT)
Dept: CARDIOLOGY | Facility: HOSPITAL | Age: 70
DRG: 287 | End: 2025-01-11
Payer: MEDICARE

## 2025-01-11 ENCOUNTER — HOSPITAL ENCOUNTER (INPATIENT)
Facility: HOSPITAL | Age: 70
End: 2025-01-11
Attending: EMERGENCY MEDICINE | Admitting: HOSPITALIST
Payer: MEDICARE

## 2025-01-11 ENCOUNTER — APPOINTMENT (OUTPATIENT)
Dept: RADIOLOGY | Facility: HOSPITAL | Age: 70
DRG: 287 | End: 2025-01-11
Payer: MEDICARE

## 2025-01-11 DIAGNOSIS — I25.110 CORONARY ARTERY DISEASE INVOLVING NATIVE CORONARY ARTERY OF NATIVE HEART WITH UNSTABLE ANGINA PECTORIS: ICD-10-CM

## 2025-01-11 DIAGNOSIS — R07.9 CHEST PAIN, UNSPECIFIED TYPE: Primary | ICD-10-CM

## 2025-01-11 DIAGNOSIS — I20.0 UNSTABLE ANGINA PECTORIS (MULTI): ICD-10-CM

## 2025-01-11 DIAGNOSIS — I20.9 ANGINA PECTORIS, UNSPECIFIED: ICD-10-CM

## 2025-01-11 LAB
ALBUMIN SERPL BCP-MCNC: 4.7 G/DL (ref 3.4–5)
ALP SERPL-CCNC: 70 U/L (ref 33–136)
ALT SERPL W P-5'-P-CCNC: 25 U/L (ref 10–52)
ANION GAP SERPL CALC-SCNC: 11 MMOL/L (ref 10–20)
AST SERPL W P-5'-P-CCNC: 18 U/L (ref 9–39)
ATRIAL RATE: 56 BPM
ATRIAL RATE: 65 BPM
BASOPHILS # BLD AUTO: 0.06 X10*3/UL (ref 0–0.1)
BASOPHILS NFR BLD AUTO: 0.9 %
BILIRUB SERPL-MCNC: 1.1 MG/DL (ref 0–1.2)
BNP SERPL-MCNC: 20 PG/ML (ref 0–99)
BUN SERPL-MCNC: 16 MG/DL (ref 6–23)
CALCIUM SERPL-MCNC: 9.6 MG/DL (ref 8.6–10.3)
CARDIAC TROPONIN I PNL SERPL HS: 5 NG/L (ref 0–20)
CARDIAC TROPONIN I PNL SERPL HS: 5 NG/L (ref 0–20)
CHLORIDE SERPL-SCNC: 106 MMOL/L (ref 98–107)
CO2 SERPL-SCNC: 26 MMOL/L (ref 21–32)
CREAT SERPL-MCNC: 0.86 MG/DL (ref 0.5–1.3)
D DIMER PPP FEU-MCNC: 235 NG/ML FEU
EGFRCR SERPLBLD CKD-EPI 2021: >90 ML/MIN/1.73M*2
EOSINOPHIL # BLD AUTO: 0.2 X10*3/UL (ref 0–0.7)
EOSINOPHIL NFR BLD AUTO: 3 %
ERYTHROCYTE [DISTWIDTH] IN BLOOD BY AUTOMATED COUNT: 11.9 % (ref 11.5–14.5)
GLUCOSE SERPL-MCNC: 124 MG/DL (ref 74–99)
HCT VFR BLD AUTO: 42.7 % (ref 41–52)
HGB BLD-MCNC: 14.4 G/DL (ref 13.5–17.5)
IMM GRANULOCYTES # BLD AUTO: 0.03 X10*3/UL (ref 0–0.7)
IMM GRANULOCYTES NFR BLD AUTO: 0.5 % (ref 0–0.9)
LIPASE SERPL-CCNC: 22 U/L (ref 9–82)
LYMPHOCYTES # BLD AUTO: 1.27 X10*3/UL (ref 1.2–4.8)
LYMPHOCYTES NFR BLD AUTO: 19.1 %
MAGNESIUM SERPL-MCNC: 2.15 MG/DL (ref 1.6–2.4)
MCH RBC QN AUTO: 30 PG (ref 26–34)
MCHC RBC AUTO-ENTMCNC: 33.7 G/DL (ref 32–36)
MCV RBC AUTO: 89 FL (ref 80–100)
MONOCYTES # BLD AUTO: 0.47 X10*3/UL (ref 0.1–1)
MONOCYTES NFR BLD AUTO: 7.1 %
NEUTROPHILS # BLD AUTO: 4.61 X10*3/UL (ref 1.2–7.7)
NEUTROPHILS NFR BLD AUTO: 69.4 %
NRBC BLD-RTO: 0 /100 WBCS (ref 0–0)
P AXIS: 57 DEGREES
P AXIS: 63 DEGREES
P OFFSET: 177 MS
P OFFSET: 190 MS
P ONSET: 119 MS
P ONSET: 135 MS
PLATELET # BLD AUTO: 184 X10*3/UL (ref 150–450)
POTASSIUM SERPL-SCNC: 4.1 MMOL/L (ref 3.5–5.3)
PR INTERVAL: 174 MS
PR INTERVAL: 176 MS
PROT SERPL-MCNC: 7.2 G/DL (ref 6.4–8.2)
Q ONSET: 207 MS
Q ONSET: 222 MS
QRS COUNT: 11 BEATS
QRS COUNT: 9 BEATS
QRS DURATION: 106 MS
QRS DURATION: 106 MS
QT INTERVAL: 408 MS
QT INTERVAL: 426 MS
QTC CALCULATION(BAZETT): 411 MS
QTC CALCULATION(BAZETT): 424 MS
QTC FREDERICIA: 416 MS
QTC FREDERICIA: 418 MS
R AXIS: -18 DEGREES
R AXIS: -39 DEGREES
RBC # BLD AUTO: 4.8 X10*6/UL (ref 4.5–5.9)
SODIUM SERPL-SCNC: 139 MMOL/L (ref 136–145)
T AXIS: 40 DEGREES
T AXIS: 69 DEGREES
T OFFSET: 411 MS
T OFFSET: 435 MS
VENTRICULAR RATE: 56 BPM
VENTRICULAR RATE: 65 BPM
WBC # BLD AUTO: 6.6 X10*3/UL (ref 4.4–11.3)

## 2025-01-11 PROCEDURE — 84484 ASSAY OF TROPONIN QUANT: CPT | Performed by: EMERGENCY MEDICINE

## 2025-01-11 PROCEDURE — 2500000004 HC RX 250 GENERAL PHARMACY W/ HCPCS (ALT 636 FOR OP/ED): Performed by: NURSE PRACTITIONER

## 2025-01-11 PROCEDURE — G0378 HOSPITAL OBSERVATION PER HR: HCPCS

## 2025-01-11 PROCEDURE — 96374 THER/PROPH/DIAG INJ IV PUSH: CPT | Mod: 59

## 2025-01-11 PROCEDURE — 2500000001 HC RX 250 WO HCPCS SELF ADMINISTERED DRUGS (ALT 637 FOR MEDICARE OP): Performed by: EMERGENCY MEDICINE

## 2025-01-11 PROCEDURE — 71045 X-RAY EXAM CHEST 1 VIEW: CPT

## 2025-01-11 PROCEDURE — 93005 ELECTROCARDIOGRAM TRACING: CPT

## 2025-01-11 PROCEDURE — 36415 COLL VENOUS BLD VENIPUNCTURE: CPT | Performed by: EMERGENCY MEDICINE

## 2025-01-11 PROCEDURE — 96375 TX/PRO/DX INJ NEW DRUG ADDON: CPT

## 2025-01-11 PROCEDURE — 85379 FIBRIN DEGRADATION QUANT: CPT | Performed by: EMERGENCY MEDICINE

## 2025-01-11 PROCEDURE — 80053 COMPREHEN METABOLIC PANEL: CPT | Performed by: EMERGENCY MEDICINE

## 2025-01-11 PROCEDURE — 71045 X-RAY EXAM CHEST 1 VIEW: CPT | Mod: FOREIGN READ | Performed by: RADIOLOGY

## 2025-01-11 PROCEDURE — 2500000001 HC RX 250 WO HCPCS SELF ADMINISTERED DRUGS (ALT 637 FOR MEDICARE OP): Performed by: NURSE PRACTITIONER

## 2025-01-11 PROCEDURE — 85025 COMPLETE CBC W/AUTO DIFF WBC: CPT | Performed by: EMERGENCY MEDICINE

## 2025-01-11 PROCEDURE — 96372 THER/PROPH/DIAG INJ SC/IM: CPT | Performed by: NURSE PRACTITIONER

## 2025-01-11 PROCEDURE — 99222 1ST HOSP IP/OBS MODERATE 55: CPT | Performed by: NURSE PRACTITIONER

## 2025-01-11 PROCEDURE — 83880 ASSAY OF NATRIURETIC PEPTIDE: CPT | Performed by: EMERGENCY MEDICINE

## 2025-01-11 PROCEDURE — 2500000004 HC RX 250 GENERAL PHARMACY W/ HCPCS (ALT 636 FOR OP/ED): Performed by: EMERGENCY MEDICINE

## 2025-01-11 PROCEDURE — 83690 ASSAY OF LIPASE: CPT | Performed by: EMERGENCY MEDICINE

## 2025-01-11 PROCEDURE — 99285 EMERGENCY DEPT VISIT HI MDM: CPT | Performed by: EMERGENCY MEDICINE

## 2025-01-11 PROCEDURE — 83735 ASSAY OF MAGNESIUM: CPT | Performed by: EMERGENCY MEDICINE

## 2025-01-11 RX ORDER — ACETAMINOPHEN 650 MG/1
650 SUPPOSITORY RECTAL EVERY 4 HOURS PRN
Status: ACTIVE | OUTPATIENT
Start: 2025-01-11

## 2025-01-11 RX ORDER — HEPARIN SODIUM 5000 [USP'U]/ML
5000 INJECTION, SOLUTION INTRAVENOUS; SUBCUTANEOUS EVERY 8 HOURS
Status: DISPENSED | OUTPATIENT
Start: 2025-01-11

## 2025-01-11 RX ORDER — ONDANSETRON HYDROCHLORIDE 2 MG/ML
4 INJECTION, SOLUTION INTRAVENOUS ONCE
Status: COMPLETED | OUTPATIENT
Start: 2025-01-11 | End: 2025-01-11

## 2025-01-11 RX ORDER — NITROGLYCERIN 0.4 MG/1
0.4 TABLET SUBLINGUAL EVERY 5 MIN PRN
Status: DISCONTINUED | OUTPATIENT
Start: 2025-01-11 | End: 2025-01-11

## 2025-01-11 RX ORDER — NAPROXEN SODIUM 220 MG/1
324 TABLET, FILM COATED ORAL ONCE
Status: COMPLETED | OUTPATIENT
Start: 2025-01-11 | End: 2025-01-11

## 2025-01-11 RX ORDER — NITROGLYCERIN 0.4 MG/1
0.4 TABLET SUBLINGUAL EVERY 5 MIN PRN
Status: ACTIVE | OUTPATIENT
Start: 2025-01-11

## 2025-01-11 RX ORDER — ACETAMINOPHEN 325 MG/1
650 TABLET ORAL EVERY 4 HOURS PRN
Status: ACTIVE | OUTPATIENT
Start: 2025-01-11

## 2025-01-11 RX ORDER — AMLODIPINE BESYLATE 5 MG/1
5 TABLET ORAL DAILY
Status: DISPENSED | OUTPATIENT
Start: 2025-01-12

## 2025-01-11 RX ORDER — MORPHINE SULFATE 4 MG/ML
4 INJECTION, SOLUTION INTRAMUSCULAR; INTRAVENOUS ONCE
Status: COMPLETED | OUTPATIENT
Start: 2025-01-11 | End: 2025-01-11

## 2025-01-11 RX ORDER — NAPROXEN SODIUM 220 MG/1
81 TABLET, FILM COATED ORAL DAILY
Status: DISPENSED | OUTPATIENT
Start: 2025-01-12

## 2025-01-11 RX ORDER — ACETAMINOPHEN 160 MG/5ML
650 SOLUTION ORAL EVERY 4 HOURS PRN
Status: ACTIVE | OUTPATIENT
Start: 2025-01-11

## 2025-01-11 RX ORDER — ISOSORBIDE MONONITRATE 30 MG/1
30 TABLET, EXTENDED RELEASE ORAL EVERY 24 HOURS
Status: DISPENSED | OUTPATIENT
Start: 2025-01-12

## 2025-01-11 RX ORDER — ATORVASTATIN CALCIUM 80 MG/1
80 TABLET, FILM COATED ORAL NIGHTLY
Status: DISPENSED | OUTPATIENT
Start: 2025-01-11

## 2025-01-11 RX ADMIN — ASPIRIN 324 MG: 81 TABLET, CHEWABLE ORAL at 15:32

## 2025-01-11 RX ADMIN — ATORVASTATIN CALCIUM 80 MG: 80 TABLET, FILM COATED ORAL at 22:20

## 2025-01-11 RX ADMIN — HEPARIN SODIUM 5000 UNITS: 5000 INJECTION INTRAVENOUS; SUBCUTANEOUS at 22:20

## 2025-01-11 RX ADMIN — ONDANSETRON 4 MG: 2 INJECTION INTRAMUSCULAR; INTRAVENOUS at 13:55

## 2025-01-11 RX ADMIN — MORPHINE SULFATE 4 MG: 4 INJECTION, SOLUTION INTRAMUSCULAR; INTRAVENOUS at 13:55

## 2025-01-11 SDOH — HEALTH STABILITY: MENTAL HEALTH: HOW OFTEN DO YOU HAVE SIX OR MORE DRINKS ON ONE OCCASION?: NEVER

## 2025-01-11 SDOH — SOCIAL STABILITY: SOCIAL NETWORK: HOW OFTEN DO YOU ATTEND MEETINGS OF THE CLUBS OR ORGANIZATIONS YOU BELONG TO?: NEVER

## 2025-01-11 SDOH — ECONOMIC STABILITY: HOUSING INSECURITY: AT ANY TIME IN THE PAST 12 MONTHS, WERE YOU HOMELESS OR LIVING IN A SHELTER (INCLUDING NOW)?: NO

## 2025-01-11 SDOH — SOCIAL STABILITY: SOCIAL INSECURITY: ARE YOU MARRIED, WIDOWED, DIVORCED, SEPARATED, NEVER MARRIED, OR LIVING WITH A PARTNER?: MARRIED

## 2025-01-11 SDOH — ECONOMIC STABILITY: TRANSPORTATION INSECURITY: IN THE PAST 12 MONTHS, HAS LACK OF TRANSPORTATION KEPT YOU FROM MEDICAL APPOINTMENTS OR FROM GETTING MEDICATIONS?: NO

## 2025-01-11 SDOH — SOCIAL STABILITY: SOCIAL INSECURITY
WITHIN THE LAST YEAR, HAVE YOU BEEN KICKED, HIT, SLAPPED, OR OTHERWISE PHYSICALLY HURT BY YOUR PARTNER OR EX-PARTNER?: NO

## 2025-01-11 SDOH — HEALTH STABILITY: MENTAL HEALTH: HOW MANY DRINKS CONTAINING ALCOHOL DO YOU HAVE ON A TYPICAL DAY WHEN YOU ARE DRINKING?: PATIENT DOES NOT DRINK

## 2025-01-11 SDOH — HEALTH STABILITY: PHYSICAL HEALTH: ON AVERAGE, HOW MANY MINUTES DO YOU ENGAGE IN EXERCISE AT THIS LEVEL?: 60 MIN

## 2025-01-11 SDOH — SOCIAL STABILITY: SOCIAL INSECURITY: HAS ANYONE EVER THREATENED TO HURT YOUR FAMILY OR YOUR PETS?: NO

## 2025-01-11 SDOH — ECONOMIC STABILITY: INCOME INSECURITY: IN THE PAST 12 MONTHS HAS THE ELECTRIC, GAS, OIL, OR WATER COMPANY THREATENED TO SHUT OFF SERVICES IN YOUR HOME?: NO

## 2025-01-11 SDOH — SOCIAL STABILITY: SOCIAL INSECURITY: WITHIN THE LAST YEAR, HAVE YOU BEEN AFRAID OF YOUR PARTNER OR EX-PARTNER?: NO

## 2025-01-11 SDOH — ECONOMIC STABILITY: FOOD INSECURITY: WITHIN THE PAST 12 MONTHS, THE FOOD YOU BOUGHT JUST DIDN'T LAST AND YOU DIDN'T HAVE MONEY TO GET MORE.: NEVER TRUE

## 2025-01-11 SDOH — ECONOMIC STABILITY: FOOD INSECURITY: WITHIN THE PAST 12 MONTHS, YOU WORRIED THAT YOUR FOOD WOULD RUN OUT BEFORE YOU GOT THE MONEY TO BUY MORE.: NEVER TRUE

## 2025-01-11 SDOH — SOCIAL STABILITY: SOCIAL NETWORK: HOW OFTEN DO YOU GET TOGETHER WITH FRIENDS OR RELATIVES?: ONCE A WEEK

## 2025-01-11 SDOH — ECONOMIC STABILITY: HOUSING INSECURITY: IN THE LAST 12 MONTHS, WAS THERE A TIME WHEN YOU WERE NOT ABLE TO PAY THE MORTGAGE OR RENT ON TIME?: NO

## 2025-01-11 SDOH — HEALTH STABILITY: MENTAL HEALTH: HOW OFTEN DO YOU HAVE A DRINK CONTAINING ALCOHOL?: NEVER

## 2025-01-11 SDOH — SOCIAL STABILITY: SOCIAL INSECURITY: ABUSE: ADULT

## 2025-01-11 SDOH — SOCIAL STABILITY: SOCIAL INSECURITY: DO YOU FEEL ANYONE HAS EXPLOITED OR TAKEN ADVANTAGE OF YOU FINANCIALLY OR OF YOUR PERSONAL PROPERTY?: NO

## 2025-01-11 SDOH — SOCIAL STABILITY: SOCIAL INSECURITY: ARE THERE ANY APPARENT SIGNS OF INJURIES/BEHAVIORS THAT COULD BE RELATED TO ABUSE/NEGLECT?: NO

## 2025-01-11 SDOH — HEALTH STABILITY: PHYSICAL HEALTH
HOW OFTEN DO YOU NEED TO HAVE SOMEONE HELP YOU WHEN YOU READ INSTRUCTIONS, PAMPHLETS, OR OTHER WRITTEN MATERIAL FROM YOUR DOCTOR OR PHARMACY?: NEVER

## 2025-01-11 SDOH — HEALTH STABILITY: MENTAL HEALTH
DO YOU FEEL STRESS - TENSE, RESTLESS, NERVOUS, OR ANXIOUS, OR UNABLE TO SLEEP AT NIGHT BECAUSE YOUR MIND IS TROUBLED ALL THE TIME - THESE DAYS?: TO SOME EXTENT

## 2025-01-11 SDOH — SOCIAL STABILITY: SOCIAL INSECURITY: DOES ANYONE TRY TO KEEP YOU FROM HAVING/CONTACTING OTHER FRIENDS OR DOING THINGS OUTSIDE YOUR HOME?: NO

## 2025-01-11 SDOH — SOCIAL STABILITY: SOCIAL INSECURITY: WITHIN THE LAST YEAR, HAVE YOU BEEN HUMILIATED OR EMOTIONALLY ABUSED IN OTHER WAYS BY YOUR PARTNER OR EX-PARTNER?: NO

## 2025-01-11 SDOH — SOCIAL STABILITY: SOCIAL INSECURITY: WERE YOU ABLE TO COMPLETE ALL THE BEHAVIORAL HEALTH SCREENINGS?: YES

## 2025-01-11 SDOH — ECONOMIC STABILITY: HOUSING INSECURITY: IN THE PAST 12 MONTHS, HOW MANY TIMES HAVE YOU MOVED WHERE YOU WERE LIVING?: 0

## 2025-01-11 SDOH — SOCIAL STABILITY: SOCIAL INSECURITY: HAVE YOU HAD ANY THOUGHTS OF HARMING ANYONE ELSE?: NO

## 2025-01-11 SDOH — SOCIAL STABILITY: SOCIAL NETWORK: HOW OFTEN DO YOU ATTEND CHURCH OR RELIGIOUS SERVICES?: NEVER

## 2025-01-11 SDOH — SOCIAL STABILITY: SOCIAL NETWORK: IN A TYPICAL WEEK, HOW MANY TIMES DO YOU TALK ON THE PHONE WITH FAMILY, FRIENDS, OR NEIGHBORS?: ONCE A WEEK

## 2025-01-11 SDOH — SOCIAL STABILITY: SOCIAL INSECURITY: HAVE YOU HAD THOUGHTS OF HARMING ANYONE ELSE?: NO

## 2025-01-11 SDOH — SOCIAL STABILITY: SOCIAL INSECURITY: DO YOU FEEL UNSAFE GOING BACK TO THE PLACE WHERE YOU ARE LIVING?: NO

## 2025-01-11 SDOH — HEALTH STABILITY: PHYSICAL HEALTH: ON AVERAGE, HOW MANY DAYS PER WEEK DO YOU ENGAGE IN MODERATE TO STRENUOUS EXERCISE (LIKE A BRISK WALK)?: 5 DAYS

## 2025-01-11 SDOH — SOCIAL STABILITY: SOCIAL INSECURITY
WITHIN THE LAST YEAR, HAVE YOU BEEN RAPED OR FORCED TO HAVE ANY KIND OF SEXUAL ACTIVITY BY YOUR PARTNER OR EX-PARTNER?: NO

## 2025-01-11 SDOH — SOCIAL STABILITY: SOCIAL NETWORK
DO YOU BELONG TO ANY CLUBS OR ORGANIZATIONS SUCH AS CHURCH GROUPS, UNIONS, FRATERNAL OR ATHLETIC GROUPS, OR SCHOOL GROUPS?: NO

## 2025-01-11 SDOH — SOCIAL STABILITY: SOCIAL INSECURITY: ARE YOU OR HAVE YOU BEEN THREATENED OR ABUSED PHYSICALLY, EMOTIONALLY, OR SEXUALLY BY ANYONE?: NO

## 2025-01-11 SDOH — ECONOMIC STABILITY: FOOD INSECURITY: HOW HARD IS IT FOR YOU TO PAY FOR THE VERY BASICS LIKE FOOD, HOUSING, MEDICAL CARE, AND HEATING?: NOT HARD AT ALL

## 2025-01-11 ASSESSMENT — COGNITIVE AND FUNCTIONAL STATUS - GENERAL
PATIENT BASELINE BEDBOUND: NO
DAILY ACTIVITIY SCORE: 24
MOBILITY SCORE: 24

## 2025-01-11 ASSESSMENT — ENCOUNTER SYMPTOMS
ABDOMINAL PAIN: 0
SHORTNESS OF BREATH: 0
HEMATURIA: 0
DYSURIA: 0
FEVER: 0
CHILLS: 0
CONSTIPATION: 0
FLANK PAIN: 0
VOMITING: 0
NAUSEA: 0
PALPITATIONS: 0
FREQUENCY: 0
DIARRHEA: 0

## 2025-01-11 ASSESSMENT — LIFESTYLE VARIABLES
HAVE PEOPLE ANNOYED YOU BY CRITICIZING YOUR DRINKING: NO
EVER FELT BAD OR GUILTY ABOUT YOUR DRINKING: NO
SKIP TO QUESTIONS 9-10: 1
SKIP TO QUESTIONS 9-10: 1
HOW OFTEN DO YOU HAVE A DRINK CONTAINING ALCOHOL: NEVER
HOW MANY STANDARD DRINKS CONTAINING ALCOHOL DO YOU HAVE ON A TYPICAL DAY: PATIENT DOES NOT DRINK
AUDIT-C TOTAL SCORE: 0
TOTAL SCORE: 0
SUBSTANCE_ABUSE_PAST_12_MONTHS: NO
HOW OFTEN DO YOU HAVE 6 OR MORE DRINKS ON ONE OCCASION: NEVER
PRESCIPTION_ABUSE_PAST_12_MONTHS: NO
EVER HAD A DRINK FIRST THING IN THE MORNING TO STEADY YOUR NERVES TO GET RID OF A HANGOVER: NO
AUDIT-C TOTAL SCORE: 0
AUDIT-C TOTAL SCORE: 0
HAVE YOU EVER FELT YOU SHOULD CUT DOWN ON YOUR DRINKING: NO

## 2025-01-11 ASSESSMENT — PAIN - FUNCTIONAL ASSESSMENT
PAIN_FUNCTIONAL_ASSESSMENT: 0-10
PAIN_FUNCTIONAL_ASSESSMENT: 0-10

## 2025-01-11 ASSESSMENT — ACTIVITIES OF DAILY LIVING (ADL)
LACK_OF_TRANSPORTATION: NO
FEEDING YOURSELF: INDEPENDENT
WALKS IN HOME: INDEPENDENT
PATIENT'S MEMORY ADEQUATE TO SAFELY COMPLETE DAILY ACTIVITIES?: YES
JUDGMENT_ADEQUATE_SAFELY_COMPLETE_DAILY_ACTIVITIES: YES
LACK_OF_TRANSPORTATION: NO
TOILETING: INDEPENDENT
HEARING - RIGHT EAR: FUNCTIONAL
GROOMING: INDEPENDENT
DRESSING YOURSELF: INDEPENDENT
ADEQUATE_TO_COMPLETE_ADL: YES
HEARING - LEFT EAR: FUNCTIONAL
BATHING: INDEPENDENT

## 2025-01-11 ASSESSMENT — PATIENT HEALTH QUESTIONNAIRE - PHQ9
1. LITTLE INTEREST OR PLEASURE IN DOING THINGS: NOT AT ALL
2. FEELING DOWN, DEPRESSED OR HOPELESS: NOT AT ALL
SUM OF ALL RESPONSES TO PHQ9 QUESTIONS 1 & 2: 0

## 2025-01-11 ASSESSMENT — COLUMBIA-SUICIDE SEVERITY RATING SCALE - C-SSRS
1. IN THE PAST MONTH, HAVE YOU WISHED YOU WERE DEAD OR WISHED YOU COULD GO TO SLEEP AND NOT WAKE UP?: NO
2. HAVE YOU ACTUALLY HAD ANY THOUGHTS OF KILLING YOURSELF?: NO
6. HAVE YOU EVER DONE ANYTHING, STARTED TO DO ANYTHING, OR PREPARED TO DO ANYTHING TO END YOUR LIFE?: NO

## 2025-01-11 ASSESSMENT — PAIN SCALES - GENERAL
PAINLEVEL_OUTOF10: 8
PAINLEVEL_OUTOF10: 2
PAINLEVEL_OUTOF10: 2

## 2025-01-11 ASSESSMENT — HEART SCORE
AGE: 65+
RISK FACTORS: >2 RISK FACTORS OR HX OF ATHEROSCLEROTIC DISEASE
ECG: NORMAL
TROPONIN: LESS THAN OR EQUAL TO NORMAL LIMIT
HISTORY: MODERATELY SUSPICIOUS
HEART SCORE: 5

## 2025-01-11 NOTE — Clinical Note
Closure device placed in the right radial artery. Site closed by radial compression system. Vasc Band 7cc of air

## 2025-01-11 NOTE — ED PROVIDER NOTES
69-year-old male presents emergency department accompanied by significant other with chief complaint of chest pain.  Patient states he has had midsternal to left-sided chest pain that sometimes goes into his left arm over the past 2 days.  He states his symptoms are worsening in intensity.  He denies any fevers, coughing, or congestion.  Denies any significant difficulty breathing or diaphoresis.  Patient denies nausea, vomiting, dysuria, diarrhea, constipation, black or bloody stools.  No reporting alleviating or exacerbating factors.  He does report headache that he states feels like his normal headache.  He has not tried any medications for his symptoms. Patient does report history of CAD states he takes aspirin regularly.  No history of cardiac stents.  Chart review shows history of hypertension, BPH, CAD, GERD, hyperlipidemia, elevated BMI, and previous tobacco use.  No reported illicit drug use or regular alcohol use.      History provided by:  Patient and spouse   used: No           ------------------------------------------------------------------------------------------------------------------------------------------    VS: As documented in the triage note and EMR flowsheet from this visit were reviewed.    Review of Systems  Constitutional: no fever, chills  Eyes: no redness, discharge, pain  HENT: no sore throat, nose bleeds, congestion, rhinorrhea   Cardiovascular: Reports chest pain no leg edema, palpitations  Respiratory: no shortness of breath, cough, wheezing  GI: no nausea, diarrhea, pain, vomiting, constipation, BRBPR, melena  : no dysuria, frequency, hematuria  Musculoskeletal: no neck pain, stiffness,  no joint deformity, swelling  Skin: no rash, erythema, wounds  Neurological: no headache, dizziness, lightheadedness, weakness, numbness, or tingling  Psychiatric: no suicidal thoughts, confusion, agitation  Metabolic: no polyuria or polydipsia  Hematologic: no increased  bleeding or bruising  Immunology: No immunocompromise state    [facility]  Nursing notes reviewed and confirmed by me.  Chart review performed including medications, allergies, and medical, surgical, and family history  Visit Vitals  /71   Pulse 60   Temp 36 °C (96.8 °F) (Temporal)   Resp 18     Physical Exam  Vitals and nursing note reviewed.   Constitutional:       General: He is not in acute distress.     Appearance: Normal appearance. He is not ill-appearing.   HENT:      Head: Normocephalic and atraumatic.      Right Ear: External ear normal.      Left Ear: External ear normal.      Nose: Nose normal. No congestion or rhinorrhea.      Mouth/Throat:      Mouth: Mucous membranes are moist.      Pharynx: No oropharyngeal exudate or posterior oropharyngeal erythema.   Eyes:      Extraocular Movements: Extraocular movements intact.      Conjunctiva/sclera: Conjunctivae normal.      Pupils: Pupils are equal, round, and reactive to light.   Cardiovascular:      Rate and Rhythm: Normal rate and regular rhythm.      Pulses: Normal pulses.      Heart sounds: Normal heart sounds.   Pulmonary:      Effort: Pulmonary effort is normal. No respiratory distress.      Breath sounds: Normal breath sounds. No stridor. No wheezing, rhonchi or rales.   Chest:      Chest wall: No tenderness (Chest pain is not reproducible with palpation).   Abdominal:      General: There is no distension.      Palpations: Abdomen is soft.      Tenderness: There is no abdominal tenderness. There is no guarding or rebound.   Musculoskeletal:         General: No swelling or deformity. Normal range of motion.      Cervical back: Normal range of motion and neck supple. No rigidity.      Right lower leg: No edema.      Left lower leg: No edema.      Comments: No calf tenderness    Skin:     General: Skin is warm and dry.      Capillary Refill: Capillary refill takes less than 2 seconds.      Coloration: Skin is not jaundiced.      Findings: No rash.    Neurological:      General: No focal deficit present.      Mental Status: He is alert and oriented to person, place, and time.      Sensory: No sensory deficit.      Motor: No weakness.   Psychiatric:         Mood and Affect: Mood normal.         Behavior: Behavior normal.        Past Medical History:   Diagnosis Date    Coronary artery disease     Hyperlipidemia     Hypertension     Prostatitis       Past Surgical History:   Procedure Laterality Date    CT ANGIO CORONARY ART WITH HEARTFLOW IF SCORE >30%  2023    CT HEART CORONARY ANGIOGRAM [id number] CT    HERNIA REPAIR  2017    Hernia Repair    OTHER SURGICAL HISTORY  2018    Shoulder surgery      Social History     Socioeconomic History    Marital status:    Tobacco Use    Smoking status: Former     Types: Cigars     Quit date: 1980     Years since quittin.0    Smokeless tobacco: Never   Substance and Sexual Activity    Alcohol use: Not Currently    Drug use: Never    Sexual activity: Defer      ------------------------------------------------------------------------------------------------------------------------------------------  XR chest 1 view   Final Result   Top normal heart size with no signs of active pulmonary parenchymal   infiltration.   Signed by Prema Dumont DO         Labs Reviewed   COMPREHENSIVE METABOLIC PANEL - Abnormal       Result Value    Glucose 124 (*)     Sodium 139      Potassium 4.1      Chloride 106      Bicarbonate 26      Anion Gap 11      Urea Nitrogen 16      Creatinine 0.86      eGFR >90      Calcium 9.6      Albumin 4.7      Alkaline Phosphatase 70      Total Protein 7.2      AST 18      Bilirubin, Total 1.1      ALT 25     MAGNESIUM - Normal    Magnesium 2.15     B-TYPE NATRIURETIC PEPTIDE - Normal    BNP 20      Narrative:        <100 pg/mL - Heart failure unlikely  100-299 pg/mL - Intermediate probability of acute heart                  failure exacerbation. Correlate with clinical                   context and patient history.    >=300 pg/mL - Heart Failure likely. Correlate with clinical                  context and patient history.    BNP testing is performed using different testing methodology at [facility] than at other [facility]. Direct result comparisons should only be made within the same method.      LIPASE - Normal    Lipase 22      Narrative:     Venipuncture immediately after or during the administration of Metamizole may lead to falsely low results. Testing should be performed immediately prior to Metamizole dosing.   D-DIMER, VTE EXCLUSION - Normal    D-Dimer, Quantitative VTE Exclusion 235      Narrative:     The VTE Exclusion D-Dimer assay is reported in ng/mL Fibrinogen Equivalent Units (FEU).    Per 's instructions for use, a value of less than 500 ng/mL (FEU) may help to exclude DVT or PE in outpatients when the assay is used with a clinical pretest probability assessment.(AE must utilize and document eCalc 'Wells Score Deep Vein Thrombosis Risk' for DVT exclusion only. Emergency Department should utilize  Guidelines for Emergency Department Use of the VTE Exclusion D-Dimer and Clinical Pretest probability assessment model for DVT or PE exclusion.)   SERIAL TROPONIN-INITIAL - Normal    Troponin I, High Sensitivity 5      Narrative:     Less than 99th percentile of normal range cutoff-  Female and children under 18 years old <14 ng/L; Male <21 ng/L: Negative  Repeat testing should be performed if clinically indicated.     Female and children under 18 years old 14-50 ng/L; Male 21-50 ng/L:  Consistent with possible cardiac damage and possible increased clinical   risk. Serial measurements may help to assess extent of myocardial damage.     >50 ng/L: Consistent with cardiac damage, increased clinical risk and  myocardial infarction. Serial measurements may help assess extent of   myocardial damage.      NOTE: Children less than 1 year old may have  higher baseline troponin   levels and results should be interpreted in conjunction with the overall   clinical context.     NOTE: Troponin I testing is performed using a different   testing methodology at [facility] than at other   [facility]. Direct result comparisons should only   be made within the same method.   SERIAL TROPONIN, 1 HOUR - Normal    Troponin I, High Sensitivity 5      Narrative:     Less than 99th percentile of normal range cutoff-  Female and children under 18 years old <14 ng/L; Male <21 ng/L: Negative  Repeat testing should be performed if clinically indicated.     Female and children under 18 years old 14-50 ng/L; Male 21-50 ng/L:  Consistent with possible cardiac damage and possible increased clinical   risk. Serial measurements may help to assess extent of myocardial damage.     >50 ng/L: Consistent with cardiac damage, increased clinical risk and  myocardial infarction. Serial measurements may help assess extent of   myocardial damage.      NOTE: Children less than 1 year old may have higher baseline troponin   levels and results should be interpreted in conjunction with the overall   clinical context.     NOTE: Troponin I testing is performed using a different   testing methodology at [facility] than at other   [facility]. Direct result comparisons should only   be made within the same method.   TROPONIN SERIES- (INITIAL, 1 HR)    Narrative:     The following orders were created for panel order Troponin I Series, High Sensitivity (0, 1 HR).  Procedure                               Abnormality         Status                     ---------                               -----------         ------                     Troponin I, High Sensiti...[[id number]]  Normal              Final result               Troponin, High Sensitivi...[[id number]]  Normal              Final result                 Please view results for these tests on the individual orders.    CBC WITH AUTO DIFFERENTIAL    WBC 6.6      nRBC 0.0      RBC 4.80      Hemoglobin 14.4      Hematocrit 42.7      MCV 89      MCH 30.0      MCHC 33.7      RDW 11.9      Platelets 184      Neutrophils % 69.4      Immature Granulocytes %, Automated 0.5      Lymphocytes % 19.1      Monocytes % 7.1      Eosinophils % 3.0      Basophils % 0.9      Neutrophils Absolute 4.61      Immature Granulocytes Absolute, Automated 0.03      Lymphocytes Absolute 1.27      Monocytes Absolute 0.47      Eosinophils Absolute 0.20      Basophils Absolute 0.06          Medical Decision Making  KG interpreted by ED physician: Normal sinus rhythm rate of 65.  IA, QRS, QTc intervals all within normal limits.  No significant ST elevations or depressions.  No significant Q waves.  Good R wave progression.  Normal axis.    Repeat EKG interpreted by ED physician: Sinus bradycardia rate 56.  IA, QRS, QTc intervals all within normal limits.  No significant ST elevations or depressions.  No significant Q waves.  Good R wave progression.  Normal axis.    69-year-old male presents emergency department with chief complaint of chest pain.  Patient states the chest pain is midsternal and going into his left chest and arm.  On my exam he is afebrile and nontoxic.  A thorough workup is obtained given his presenting symptoms.  Patient treated with aspirin, morphine, and Zofran.  On reevaluation he reports chest pain is improved.  CMP showed no significant metabolic abnormalities.  CBC did not show significant leukocytosis or anemia.  Patient does not have findings of sepsis.  Lipase within normal range I do not suspect pancreatitis.  BNP within normal range patient does not have findings of decompensated heart failure.  D-dimer is negative making blood clot unlikely.  Cardiac enzymes x 2 and EKG do not show findings of acute ACS.  Chest x-ray shows no acute cardiopulmonary process such as pneumonia, pleural effusion, or pulmonary edema.  Patient does have  significantly elevated heart score at 5.  I discussed with him close outpatient follow-up versus admission for further cardiac evaluation.  Patient states he would prefer to be admitted to the hospital as he does not feel he can follow-up closely outpatient with his cardiologist.  In addition, he states he has been told he has partial blockage of his heart and is concerned that this is worsening given these new symptoms.  Patient also reports that he noticed his symptoms worsened after shoveling his driveway and we discussed that this could be consistent with him failing his own stress test.  Therefore, I recommend admission.  Patient agreeable with this plan.  Case discussed with hospitalist on-call.       Diagnoses as of 01/11/25 2027   Chest pain, unspecified type      1. Chest pain, unspecified type           Procedures     This note was dictated using dragon software and may contain errors related to dictation interpretation errors.      Bryce Brown, DO  01/11/25 2027

## 2025-01-12 VITALS
OXYGEN SATURATION: 96 % | HEART RATE: 63 BPM | RESPIRATION RATE: 18 BRPM | SYSTOLIC BLOOD PRESSURE: 119 MMHG | HEIGHT: 66 IN | BODY MASS INDEX: 33.75 KG/M2 | TEMPERATURE: 97.2 F | WEIGHT: 210 LBS | DIASTOLIC BLOOD PRESSURE: 56 MMHG

## 2025-01-12 PROBLEM — R07.9 CHEST PAIN, UNSPECIFIED TYPE: Status: ACTIVE | Noted: 2025-01-12

## 2025-01-12 LAB
ANION GAP SERPL CALC-SCNC: 9 MMOL/L (ref 10–20)
BUN SERPL-MCNC: 17 MG/DL (ref 6–23)
CALCIUM SERPL-MCNC: 8.9 MG/DL (ref 8.6–10.3)
CHLORIDE SERPL-SCNC: 107 MMOL/L (ref 98–107)
CO2 SERPL-SCNC: 25 MMOL/L (ref 21–32)
CREAT SERPL-MCNC: 0.94 MG/DL (ref 0.5–1.3)
EGFRCR SERPLBLD CKD-EPI 2021: 88 ML/MIN/1.73M*2
ERYTHROCYTE [DISTWIDTH] IN BLOOD BY AUTOMATED COUNT: 12 % (ref 11.5–14.5)
GLUCOSE SERPL-MCNC: 99 MG/DL (ref 74–99)
HCT VFR BLD AUTO: 40.2 % (ref 41–52)
HGB BLD-MCNC: 13.7 G/DL (ref 13.5–17.5)
HOLD SPECIMEN: NORMAL
HOLD SPECIMEN: NORMAL
MAGNESIUM SERPL-MCNC: 2.08 MG/DL (ref 1.6–2.4)
MCH RBC QN AUTO: 30.2 PG (ref 26–34)
MCHC RBC AUTO-ENTMCNC: 34.1 G/DL (ref 32–36)
MCV RBC AUTO: 89 FL (ref 80–100)
NRBC BLD-RTO: 0 /100 WBCS (ref 0–0)
PLATELET # BLD AUTO: 171 X10*3/UL (ref 150–450)
POTASSIUM SERPL-SCNC: 4 MMOL/L (ref 3.5–5.3)
RBC # BLD AUTO: 4.54 X10*6/UL (ref 4.5–5.9)
SODIUM SERPL-SCNC: 137 MMOL/L (ref 136–145)
WBC # BLD AUTO: 6.4 X10*3/UL (ref 4.4–11.3)

## 2025-01-12 PROCEDURE — 96372 THER/PROPH/DIAG INJ SC/IM: CPT | Performed by: NURSE PRACTITIONER

## 2025-01-12 PROCEDURE — 2500000004 HC RX 250 GENERAL PHARMACY W/ HCPCS (ALT 636 FOR OP/ED): Performed by: NURSE PRACTITIONER

## 2025-01-12 PROCEDURE — 83735 ASSAY OF MAGNESIUM: CPT

## 2025-01-12 PROCEDURE — 2500000001 HC RX 250 WO HCPCS SELF ADMINISTERED DRUGS (ALT 637 FOR MEDICARE OP): Performed by: NURSE PRACTITIONER

## 2025-01-12 PROCEDURE — 36415 COLL VENOUS BLD VENIPUNCTURE: CPT

## 2025-01-12 PROCEDURE — 80048 BASIC METABOLIC PNL TOTAL CA: CPT

## 2025-01-12 PROCEDURE — 99232 SBSQ HOSP IP/OBS MODERATE 35: CPT

## 2025-01-12 PROCEDURE — 99223 1ST HOSP IP/OBS HIGH 75: CPT | Performed by: INTERNAL MEDICINE

## 2025-01-12 PROCEDURE — 85027 COMPLETE CBC AUTOMATED: CPT

## 2025-01-12 PROCEDURE — 1200000002 HC GENERAL ROOM WITH TELEMETRY DAILY

## 2025-01-12 RX ADMIN — HEPARIN SODIUM 5000 UNITS: 5000 INJECTION INTRAVENOUS; SUBCUTANEOUS at 06:16

## 2025-01-12 RX ADMIN — HEPARIN SODIUM 5000 UNITS: 5000 INJECTION INTRAVENOUS; SUBCUTANEOUS at 14:37

## 2025-01-12 RX ADMIN — ATORVASTATIN CALCIUM 80 MG: 80 TABLET, FILM COATED ORAL at 21:31

## 2025-01-12 RX ADMIN — AMLODIPINE BESYLATE 5 MG: 5 TABLET ORAL at 09:06

## 2025-01-12 RX ADMIN — ASPIRIN 81 MG: 81 TABLET, CHEWABLE ORAL at 09:06

## 2025-01-12 RX ADMIN — HEPARIN SODIUM 5000 UNITS: 5000 INJECTION INTRAVENOUS; SUBCUTANEOUS at 21:31

## 2025-01-12 RX ADMIN — ISOSORBIDE MONONITRATE 30 MG: 30 TABLET, EXTENDED RELEASE ORAL at 09:06

## 2025-01-12 SDOH — ECONOMIC STABILITY: FOOD INSECURITY: WITHIN THE PAST 12 MONTHS, YOU WORRIED THAT YOUR FOOD WOULD RUN OUT BEFORE YOU GOT MONEY TO BUY MORE.: NEVER TRUE

## 2025-01-12 SDOH — ECONOMIC STABILITY: TRANSPORTATION INSECURITY
IN THE PAST 12 MONTHS, HAS LACK OF TRANSPORTATION KEPT YOU FROM MEETINGS, WORK, OR FROM GETTING THINGS NEEDED FOR DAILY LIVING?: NO

## 2025-01-12 SDOH — ECONOMIC STABILITY: FOOD INSECURITY: WITHIN THE PAST 12 MONTHS, THE FOOD YOU BOUGHT JUST DIDN'T LAST AND YOU DIDN'T HAVE MONEY TO GET MORE.: NEVER TRUE

## 2025-01-12 SDOH — ECONOMIC STABILITY: HOUSING INSECURITY: IN THE PAST 12 MONTHS HAS THE ELECTRIC, GAS, OIL, OR WATER COMPANY THREATENED TO SHUT OFF SERVICES IN YOUR HOME?: NO

## 2025-01-12 SDOH — ECONOMIC STABILITY: HOUSING INSECURITY
IN THE LAST 12 MONTHS, WAS THERE A TIME WHEN YOU DID NOT HAVE A STEADY PLACE TO SLEEP OR SLEPT IN A SHELTER (INCLUDING NOW)?: NO

## 2025-01-12 SDOH — ECONOMIC STABILITY: HOUSING INSECURITY

## 2025-01-12 SDOH — ECONOMIC STABILITY: INCOME INSECURITY: IN THE LAST 12 MONTHS, WAS THERE A TIME WHEN YOU WERE NOT ABLE TO PAY THE MORTGAGE OR RENT ON TIME?: NO

## 2025-01-12 SDOH — ECONOMIC STABILITY: HOUSING INSECURITY: IN THE LAST 12 MONTHS, WAS THERE A TIME WHEN YOU WERE NOT ABLE TO PAY THE MORTGAGE OR RENT ON TIME?: NO

## 2025-01-12 SDOH — ECONOMIC STABILITY: HOUSING INSECURITY: IN THE LAST 12 MONTHS, HOW MANY PLACES HAVE YOU LIVED?: 1

## 2025-01-12 SDOH — ECONOMIC STABILITY: FOOD INSECURITY

## 2025-01-12 SDOH — ECONOMIC STABILITY: FOOD INSECURITY: WITHIN THE PAST 12 MONTHS, YOU WORRIED THAT YOUR FOOD WOULD RUN OUT BEFORE YOU GOT THE MONEY TO BUY MORE.: NEVER TRUE

## 2025-01-12 SDOH — ECONOMIC STABILITY: TRANSPORTATION INSECURITY: IN THE PAST 12 MONTHS, HAS LACK OF TRANSPORTATION KEPT YOU FROM MEDICAL APPOINTMENTS OR FROM GETTING MEDICATIONS?: NO

## 2025-01-12 SDOH — ECONOMIC STABILITY: GENERAL

## 2025-01-12 SDOH — ECONOMIC STABILITY: TRANSPORTATION INSECURITY
IN THE PAST 12 MONTHS, HAS THE LACK OF TRANSPORTATION KEPT YOU FROM MEDICAL APPOINTMENTS OR FROM GETTING MEDICATIONS?: NO

## 2025-01-12 SDOH — ECONOMIC STABILITY: TRANSPORTATION INSECURITY

## 2025-01-12 ASSESSMENT — COGNITIVE AND FUNCTIONAL STATUS - GENERAL
MOBILITY SCORE: 24
DAILY ACTIVITIY SCORE: 24
DAILY ACTIVITIY SCORE: 24
MOBILITY SCORE: 24

## 2025-01-12 ASSESSMENT — SOCIAL DETERMINANTS OF HEALTH (SDOH): IN THE PAST 12 MONTHS, HAS THE ELECTRIC, GAS, OIL, OR WATER COMPANY THREATENED TO SHUT OFF SERVICE IN YOUR HOME?: NO

## 2025-01-12 ASSESSMENT — PAIN SCALES - GENERAL: PAINLEVEL_OUTOF10: 0 - NO PAIN

## 2025-01-12 ASSESSMENT — ACTIVITIES OF DAILY LIVING (ADL): LACK_OF_TRANSPORTATION: NO

## 2025-01-12 NOTE — PROGRESS NOTES
"Daily Progress Note    Demetrio Malave is a 69 y.o. male on day 0 of admission presenting with Chest pain.    Subjective   Patient seen and examined, labs and vitals reviewed. States he feels well today, continues to have left sided chest pressure. Denies SOB, abd pain, headache, dizziness, n/v/d. Cardiology consult, plan for [facility] tomorrow.        Objective   Physical Exam  Constitutional:       Appearance: Normal appearance. He is obese.   HENT:      Head: Normocephalic.      Mouth/Throat:      Mouth: Mucous membranes are moist.   Eyes:      Extraocular Movements: Extraocular movements intact.      Pupils: Pupils are equal, round, and reactive to light.   Cardiovascular:      Rate and Rhythm: Normal rate and regular rhythm.      Heart sounds: Normal heart sounds, S1 normal and S2 normal.   Pulmonary:      Effort: Pulmonary effort is normal.      Breath sounds: Normal breath sounds.   Chest:      Chest wall: No tenderness.   Abdominal:      General: Bowel sounds are normal.      Palpations: Abdomen is soft.      Tenderness: There is no abdominal tenderness.   Musculoskeletal:         General: Normal range of motion.      Cervical back: Neck supple.      Right lower leg: No edema.      Left lower leg: No edema.   Skin:     General: Skin is warm.   Neurological:      Mental Status: He is alert and oriented to person, place, and time.   Psychiatric:         Mood and Affect: Mood normal.         Behavior: Behavior normal.         Last Recorded Vitals  Blood pressure 119/66, pulse 58, temperature 36.4 °C (97.5 °F), resp. rate 18, height 1.676 m (5' 6\"), weight 95.3 kg (210 lb), SpO2 96%.  Intake/Output last 3 Shifts:  No intake/output data recorded.    Medications  Scheduled medications  amLODIPine, 5 mg, oral, Daily  aspirin, 81 mg, oral, Daily  atorvastatin, 80 mg, oral, Nightly  heparin (porcine), 5,000 Units, subcutaneous, q8h  isosorbide mononitrate ER, 30 mg, oral, q24h      Continuous medications     PRN " medications  PRN medications: acetaminophen **OR** acetaminophen **OR** acetaminophen, nitroglycerin    Labs  CBC:   Results from last 7 days   Lab Units 01/12/25  0836 01/11/25  1347   WBC AUTO x10*3/uL 6.4 6.6   RBC AUTO x10*6/uL 4.54 4.80   HEMOGLOBIN g/dL 13.7 14.4   HEMATOCRIT % 40.2* 42.7   MCV fL 89 89   MCH pg 30.2 30.0   MCHC g/dL 34.1 33.7   RDW % 12.0 11.9   PLATELETS AUTO x10*3/uL 171 184     CMP:    Results from last 7 days   Lab Units 01/12/25  0836 01/11/25  1347   SODIUM mmol/L 137 139   POTASSIUM mmol/L 4.0 4.1   CHLORIDE mmol/L 107 106   CO2 mmol/L 25 26   BUN mg/dL 17 16   CREATININE mg/dL 0.94 0.86   GLUCOSE mg/dL 99 124*   PROTEIN TOTAL g/dL  --  7.2   CALCIUM mg/dL 8.9 9.6   BILIRUBIN TOTAL mg/dL  --  1.1   ALK PHOS U/L  --  70   AST U/L  --  18   ALT U/L  --  25     BMP:    Results from last 7 days   Lab Units 01/12/25  0836 01/11/25  1347   SODIUM mmol/L 137 139   POTASSIUM mmol/L 4.0 4.1   CHLORIDE mmol/L 107 106   CO2 mmol/L 25 26   BUN mg/dL 17 16   CREATININE mg/dL 0.94 0.86   CALCIUM mg/dL 8.9 9.6   GLUCOSE mg/dL 99 124*     Magnesium:  Results from last 7 days   Lab Units 01/12/25  0836 01/11/25  1347   MAGNESIUM mg/dL 2.08 2.15     Troponin:    Results from last 7 days   Lab Units 01/11/25  1538 01/11/25  1347   TROPHS ng/L 5 5     BNP:   Results from last 7 days   Lab Units 01/11/25  1347   BNP pg/mL 20     Lipid Panel:         Nutrition             Relevant Results  Results from last 7 days   Lab Units 01/12/25  0836 01/11/25  1347   GLUCOSE mg/dL 99 124*     Lab Results   Component Value Date    HGBA1C 5.2 10/25/2023        Assessment/Plan    Chest pain  CAD  HTN/HLD  -TTE 1/2023 with LVEF 60-65%, impaired LV diastolic filling  -[facility] 2/2023 showed double vessel disease, 50% LAD and 50% PDA stenosis  -CXR unremarkable  -Troponin negative x 2, 5/5  -EKG without acute ischemic changes  -Tele monitoring  -Daily ASA, statin, Imdur  -Cardiology consult, plan for [facility] tomorrow, further recs  after procedure  -Daily CBC, BMP, Mg    DVTp: heparin, SCDs  PLAN: anticipate home    Maria Ines Castellanos PA-C    Plan of care was discussed extensively with patient.  Patient verbalized understanding through teach back method.  All question and concerns addressed upon examination.    Of note, this documentation is completed using the Dragon Dictation system (voice recognition software). There may be spelling and/or grammatical errors that were not corrected prior to final submission.

## 2025-01-12 NOTE — H&P
History Of Present Illness  Demetrio Malave is a 69 y.o. male with a past medical history of CAD, HTN, and HLD who presented to the ED with chest pain. The pain is described as midsternal to left sided, with intermittent radiation to the left arm. The symptoms are reported to be worsening, however he is unclear as to any specific aggravating or alleviating factors. He denies any associated nausea, shortness of breath, or palpitation. He had a LHC in 3/2023 with 50% stenosis of the mid LAD and mid RCA     Past Medical History  Past Medical History:   Diagnosis Date    Coronary artery disease     Hyperlipidemia     Hypertension     Prostatitis        Surgical History  Past Surgical History:   Procedure Laterality Date    CT ANGIO CORONARY ART WITH HEARTFLOW IF SCORE >30%  2/22/2023    CT HEART CORONARY ANGIOGRAM [id number] CT    HERNIA REPAIR  02/16/2017    Hernia Repair    OTHER SURGICAL HISTORY  12/17/2018    Shoulder surgery        Social History  He reports that he quit smoking about 45 years ago. His smoking use included cigars. He has never used smokeless tobacco. He reports that he does not currently use alcohol. He reports that he does not use drugs.    Family History  Family History   Problem Relation Name Age of Onset    Leukemia Father      Colon cancer Father          Allergies  Patient has no known allergies.    Review of Systems   Constitutional:  Negative for chills and fever.   Respiratory:  Negative for shortness of breath.    Cardiovascular:  Positive for chest pain. Negative for palpitations.   Gastrointestinal:  Negative for abdominal pain, constipation, diarrhea, nausea and vomiting.   Genitourinary:  Negative for dysuria, flank pain, frequency, hematuria and urgency.   All other systems reviewed and are negative.       Physical Exam  Vitals reviewed.   HENT:      Head: Normocephalic and atraumatic.   Cardiovascular:      Rate and Rhythm: Normal rate and regular rhythm.      Heart sounds: Normal  "heart sounds.   Pulmonary:      Effort: Pulmonary effort is normal.      Breath sounds: Normal air entry.   Abdominal:      General: Bowel sounds are normal.      Palpations: Abdomen is soft.      Tenderness: There is no abdominal tenderness.   Musculoskeletal:         General: No deformity.   Skin:     General: Skin is warm and dry.   Neurological:      General: No focal deficit present.      Mental Status: He is alert and oriented to person, place, and time.   Psychiatric:         Mood and Affect: Mood normal.         Behavior: Behavior normal.          Last Recorded Vitals  Blood pressure 136/71, pulse 60, temperature 36 °C (96.8 °F), temperature source Temporal, resp. rate 18, height 1.676 m (5' 6\"), weight 95.3 kg (210 lb), SpO2 99%.    Relevant Results      Lab Results   Component Value Date    WBC 6.6 01/11/2025    HGB 14.4 01/11/2025    HCT 42.7 01/11/2025    MCV 89 01/11/2025     01/11/2025     Lab Results   Component Value Date    GLUCOSE 124 (H) 01/11/2025    CALCIUM 9.6 01/11/2025     01/11/2025    K 4.1 01/11/2025    CO2 26 01/11/2025     01/11/2025    BUN 16 01/11/2025    CREATININE 0.86 01/11/2025     ECG 12 lead  Sinus bradycardia  Low voltage QRS  Borderline ECG  When compared with ECG of 11-JAN-2025 13:08, (unconfirmed)  No significant change was found  XR chest 1 view  Narrative: STUDY:  Chest Radiograph;  1/11/2025 2:02 PM  INDICATION:  Chest pain.  COMPARISON:  XR chest 2/2/2024.  CT abdomen and pelvis 10/21/2024  ACCESSION NUMBER(S):  [id number]  ORDERING CLINICIAN:  DARLENE CERRATO  TECHNIQUE:  Frontal chest was obtained at 14:01 hours.  FINDINGS:  CARDIOMEDIASTINAL SILHOUETTE:  Cardiomediastinal silhouette is top normal in size accentuated by  slightly lower lung volumes.     LUNGS:  Lungs are clear.     ABDOMEN:  No acute upper abdominal findings.  A small density in the left of  upper abdomen adjacent to the spine appears to be related to summation  artifact.   "   BONES:  No acute osseous changes.  Impression: Top normal heart size with no signs of active pulmonary parenchymal  infiltration.  Signed by Prema Dumont DO  ECG 12 lead  Normal sinus rhythm  Left axis deviation  Abnormal ECG  When compared with ECG of 02-FEB-2024 12:36,  Incomplete right bundle branch block is no longer Present    ED Medication Administration from 01/11/2025 1303 to 01/11/2025 1951         Date/Time Order Dose Route Action Action by     01/11/2025 1355 EST morphine injection 4 mg 4 mg intravenous Given Toyoda, P     01/11/2025 1355 EST ondansetron (Zofran) injection 4 mg 4 mg intravenous Given Toyoda, P     01/11/2025 1532 EST aspirin chewable tablet 324 mg 324 mg oral Given Ayere, P               Assessment/Plan   Assessment & Plan  Chest pain      #Chest pain  #CAD  -Troponin -ve x2  -EKG without acute ischemic changes  -Telemetry  -NTG SL  -ASA, statin  -Consult cardiology  -Will keep NPO MN    #HTN  #HLD  -Continue home medications pending nursing review           Alex Patel, LEODAN-CNP

## 2025-01-12 NOTE — CONSULTS
Cardiology Consult Note      Date:   1/12/2025  Patient name:  Demetrio Malave  Date of admission:  1/11/2025  1:11 PM  MRN:   [id number]  YOB: 1955  Time of Consult:  10:18 AM    Consulting Cardiologist: Dr. Darrell Maxwell      Primary Cardiologist:  Dr. Darrell Durand    Referring Provider: Dr. wolff      Admission Diagnosis:     Chest pain      History of Present Illness:      Demetrio Malave is a 69 y.o.  male patient who is being at the request of Dr. Wolff for inpatient consultation of chest pain angina.  He was admitted on 1/11/2025.  Previous [facility] and [facility] records have been reviewed in detail.      This is a 69-year-old gentleman who returns to the hospital with chest pain type syndrome suggestive of angina pectoris.  He had undergone heart catheterization in March 2023 showing moderate coronary disease with normal LV function.  He follows regular with Dr. Durand.  He is retired.  He says he started to develop symptomatology which became worse over the last day or 2 so he came to the emergency room.  Initial EKG and labs showed no significant abnormalities.  He has been observed.  He still having some recurrent symptoms at this time.  Does have a history of hypertension hyperlipidemia and some prostate issues in the past.  He is a former smoker.  Again he is retired and is on usual medications in the outpatient setting under the care of Dr. Durand.  Allergies:     No Known Allergies      Past Medical History:     Past Medical History:   Diagnosis Date    Coronary artery disease     Hyperlipidemia     Hypertension     Prostatitis        Past Surgical History:     Past Surgical History:   Procedure Laterality Date    CT ANGIO CORONARY ART WITH HEARTFLOW IF SCORE >30%  2/22/2023    CT HEART CORONARY ANGIOGRAM [id number] CT    HERNIA REPAIR  02/16/2017    Hernia Repair    OTHER SURGICAL HISTORY  12/17/2018    Shoulder surgery       Family History:     Family History   Problem Relation Name  Age of Onset    Leukemia Father      Colon cancer Father         Social History:     Social History     Tobacco Use    Smoking status: Former     Types: Cigars     Quit date:      Years since quittin.0    Smokeless tobacco: Never   Substance Use Topics    Alcohol use: Not Currently    Drug use: Never       CURRENT MEDICATIONS    amLODIPine, 5 mg, oral, Daily  aspirin, 81 mg, oral, Daily  atorvastatin, 80 mg, oral, Nightly  heparin (porcine), 5,000 Units, subcutaneous, q8h  isosorbide mononitrate ER, 30 mg, oral, q24h         Current Outpatient Medications   Medication Instructions    amLODIPine (NORVASC) 5 mg, oral, Daily    aspirin 81 mg, oral, Daily    atorvastatin (LIPITOR) 40 mg, oral, Daily    isosorbide mononitrate ER (IMDUR) 30 mg, oral, Every 24 hours    mometasone (Elocon) 0.1 % ointment Topical, Daily    nitroglycerin (NITROSTAT) 0.4 mg, sublingual, Every 5 min PRN, as directed        Review of Systems:      12 point review of systems was obtained in detail and is negative other than that detailed above.    Vital Signs:     Vitals:    25 2045 25 2136 25 0500 25 0725   BP: 135/89 133/74 136/76 119/66   BP Location: Left arm Left arm Right arm    Patient Position: Sitting Sitting Sitting    Pulse: 62 56 58    Resp: (!)  18     Temp:  36.2 °C (97.2 °F) 36.3 °C (97.3 °F) 36.4 °C (97.5 °F)   TempSrc:  Temporal Temporal    SpO2: 96% 96% 96% 96%   Weight:       Height:         No intake or output data in the 24 hours ending 25 1018    Wt Readings from Last 4 Encounters:   25 95.3 kg (210 lb)   10/28/24 100 kg (220 lb 7.4 oz)   10/25/24 99.3 kg (219 lb)   24 96.9 kg (213 lb 9.6 oz)       Physical Examination:     GENERAL APPEARANCE: Well developed, well nourished, in no acute distress.  CHEST: Symmetric and non-tender.  INTEGUMENT: Skin warm and dry, without gross excoriationis or lesions.  HEENT: No gross abnormalities of conjunctiva, teeth, gums, oral  "mucosa  NECK: Supple, no JVD, no bruit. Thyroid not palpable. Carotid upstrokes normal.  NEURO/PSHCY: Alert and oriented x3; appropriate behavior and responses and responses, grossly normal cerebellar function with normal balance and coordination  LUNGS: Clear to auscultation bilaterally; normal respiratory effort.  HEART: Rate and rhythm regular with no evident murmur; no gallop appreciated. There are no rubs, clicks or heaves. PMI nondisplaced.  ABDOMEN: Soft, nontender, no palpable hepatosplenomegaly, no mases, no bruits. Abdominal aorta not noted to be enlarged.  MUSCULOSKELETAL: Ambulatory with normal tandem gait.  EXTREMITIES: Warm with good color, no clubbing or cyanois. There is no edema noted.  PERIPHERAL VASCULAR: Pulses present and equally palpable; 2+ throughout. No femoral bruits.      Lab:     CBC:   Lab Results   Component Value Date    WBC 6.4 01/12/2025    RBC 4.54 01/12/2025    HGB 13.7 01/12/2025    HCT 40.2 (L) 01/12/2025     01/12/2025        CMP:    Lab Results   Component Value Date     01/12/2025    K 4.0 01/12/2025     01/12/2025    CO2 25 01/12/2025    BUN 17 01/12/2025    CREATININE 0.94 01/12/2025    GLUCOSE 99 01/12/2025    CALCIUM 8.9 01/12/2025       Magnesium:    Lab Results   Component Value Date    MG 2.08 01/12/2025       Lipid Profile:    No results found for: \"CHLPL\", \"TRIG\", \"HDL\", \"LDLCALC\", \"LDLDIRECT\"    TSH:    No results found for: \"TSH\"    BNP:   Lab Results   Component Value Date    BNP 20 01/11/2025        PT/INR:    No results found for: \"PROTIME\", \"INR\"    HgBA1c:    Lab Results   Component Value Date    HGBA1C 5.2 10/25/2023       BMP:  Lab Results   Component Value Date     01/12/2025     01/11/2025    K 4.0 01/12/2025    K 4.1 01/11/2025     01/12/2025     01/11/2025    CO2 25 01/12/2025    CO2 26 01/11/2025    BUN 17 01/12/2025    BUN 16 01/11/2025    CREATININE 0.94 01/12/2025    CREATININE 0.86 01/11/2025       CBC:  Lab " Results   Component Value Date    WBC 6.4 01/12/2025    WBC 6.6 01/11/2025    RBC 4.54 01/12/2025    RBC 4.80 01/11/2025    HGB 13.7 01/12/2025    HGB 14.4 01/11/2025    HCT 40.2 (L) 01/12/2025    HCT 42.7 01/11/2025    MCV 89 01/12/2025    MCV 89 01/11/2025    MCH 30.2 01/12/2025    MCH 30.0 01/11/2025    MCHC 34.1 01/12/2025    MCHC 33.7 01/11/2025    RDW 12.0 01/12/2025    RDW 11.9 01/11/2025     01/12/2025     01/11/2025       Cardiac Enzymes:    Lab Results   Component Value Date    TROPHS 5 01/11/2025    TROPHS 5 01/11/2025    TROPHS 5 02/02/2024       Hepatic Function Panel:    Lab Results   Component Value Date    ALKPHOS 70 01/11/2025    ALT 25 01/11/2025    AST 18 01/11/2025    PROT 7.2 01/11/2025    BILITOT 1.1 01/11/2025       Diagnostic Studies:     ECG 12 lead    Result Date: 1/11/2025  Sinus bradycardia Low voltage QRS Borderline ECG When compared with ECG of 11-JAN-2025 13:08, (unconfirmed) No significant change was found    XR chest 1 view    Result Date: 1/11/2025  STUDY: Chest Radiograph;  1/11/2025 2:02 PM INDICATION: Chest pain. COMPARISON: XR chest 2/2/2024.  CT abdomen and pelvis 10/21/2024 ACCESSION NUMBER(S): [id number] ORDERING CLINICIAN: DARLENE CERRATO TECHNIQUE:  Frontal chest was obtained at 14:01 hours. FINDINGS: CARDIOMEDIASTINAL SILHOUETTE: Cardiomediastinal silhouette is top normal in size accentuated by slightly lower lung volumes.  LUNGS: Lungs are clear.  ABDOMEN: No acute upper abdominal findings.  A small density in the left of upper abdomen adjacent to the spine appears to be related to summation artifact.  BONES: No acute osseous changes.    Top normal heart size with no signs of active pulmonary parenchymal infiltration. Signed by Prema Laakso, DO    ECG 12 lead    Result Date: 1/11/2025  Normal sinus rhythm Left axis deviation Abnormal ECG When compared with ECG of 02-FEB-2024 12:36, Incomplete right bundle branch block is no longer Present    Cardiac Cath  Transition of Care Summary:  Post Procedure Diagnosis: Double vessel disease.  Blood Loss:               Estimated blood loss during the procedure was 0 mls.  Specimens Removed:        Number of specimen(s) removed: none.     ____________________________________________________________________________________  CONCLUSIONS:  1. The entire Left Main: 0% stenosis.  2. Mid LAD Lesion: The percent stenosis is 50%.  3. Prox 1st Diag LAD Lesion: The percent stenosis is 30%.  4. Mid CX Lesion: The percent stenosis is 10-30%.  5. Mid 2nd OM CX Lesion: The percent stenosis is 10-30 %.  6. Proximal RCA Lesion: The percent stenosis is 40%.  7. Mid PDA RCA Lesion: The percent stenosis is 50 %.  8. The Left Ventricular Ejection Fraction is 55%.     ____________________________________________________________________________________  CPT Codes:  Left Heart Cath (visualization of coronaries) and LV-17096; Moderate Sedation Services initial 15 minutes patient >5 years-[id number]     ICD 10 Codes:  I25.119-Atherosclerotic heart disease of native coronary artery with unspecified angina pectoris     [id number] Jerald Mauricio MD  Performing Physician  Electronically signed by [id number] Jerald Mauricio MD on 3/2/2023 at 9:15:47  AM        cc Report to: JERALD MAURICIO     cc Report to: [id number] Jerald Mauricio MD     cc Report to: [place] [id number]   Tel [phone] Fax [phone]     TRANSTHORACIC ECHOCARDIOGRAM REPORT        Patient Name:     RAE Hayes Physician:  [id number] Braden Moore MD  Study Date:       1/30/2023           Referring           BRIGITTE MARTELL                                        Physician:  MRN/PID:          [id number]            PCP:  Accession/Order#: [id number]           Department          [facility] Echo                                        Location:            Lab  YOB: 1955           Fellow:  Gender:           M                   Nurse:              Demetrio Mcclure RN  Admit Date:       1/29/2023           Sonographer:        Marnie Henao                                                            [facility]  Admission Status: Observation -       Additional Staff:                    Priority discharge  Height:           170.00 cm           CC Report to:       [facility]  Weight:           101.01 kg           Study Type:         Echocardiogram  BSA:              2.11 m2  Blood Pressure: 145 /93 mmHg     Diagnosis/ICD: R07.89-Other chest pain; R01.1-Cardiac murmur, unspecified  Indication:    Chest Pain  Procedure/CPT: Echo Complete w Full Doppler-[id number]     Patient History:  Smoker:            Former.  Pertinent History: Cancer.     Study Detail: The following Echo studies were performed: 2D, M-Mode, Doppler and                color flow. Agitated saline used as a contrast agent for                intraseptal flow evaluation.        PHYSICIAN INTERPRETATION:  Left Ventricle: Left ventricular systolic function is normal, with an estimated ejection fraction of 60-65%. There are no regional wall motion abnormalities. The left ventricular cavity size is normal. Spectral Doppler shows an impaired relaxation pattern of left ventricular diastolic filling.  Left Atrium: The left atrium is normal in size. A bubble study using agitated saline was performed. Bubble study is negative.  Right Ventricle: The right ventricle is normal in size. There is normal right ventricular global systolic function.  Right Atrium: The right atrium is normal in size.  Aortic Valve: The aortic valve is trileaflet. There is mild aortic valve cusp calcification. There is no evidence of aortic valve regurgitation. The peak instantaneous gradient of the aortic valve is 10.9 mmHg. The mean gradient of the aortic valve is 6.0 mmHg.  Mitral Valve: The mitral valve is mildly  thickened. There is mild mitral valve regurgitation.  Tricuspid Valve: The tricuspid valve is structurally normal. There is trace tricuspid regurgitation.  Pulmonic Valve: The pulmonic valve is not well visualized. There is no indication of pulmonic valve regurgitation.  Pericardium: There is no pericardial effusion noted.  Aorta: The aortic root is normal.        CONCLUSIONS:   1. Left ventricular systolic function is normal with a 60-65% estimated ejection fraction.   2. Spectral Doppler shows an impaired relaxation pattern of left ventricular diastolic filling.   3. Negative bubble study.  Radiology:     XR chest 1 view   Final Result   Top normal heart size with no signs of active pulmonary parenchymal   infiltration.   Signed by Prema Dumont DO          Problem List:     Patient Active Problem List   Diagnosis    Abnormal computed tomography angiography of heart    Actinic keratosis    Other seborrheic keratosis    Arthritis    BPH associated with nocturia    Benign essential hypertension    CAD (coronary artery disease)    Chest pain    Chronic rhinitis    Chronic throat clearing    Cluster headaches    Colon polyps    Epidermal cyst    Epididymitis    GERD without esophagitis    Hemangioma of skin and subcutaneous tissue    Hyperlipidemia    Infected sebaceous cyst    Neoplasm of uncertain behavior of skin    Nocturnal polyuria    Obesity, Class I, BMI 30-34.9    Obstructive sleep apnea    Occasional tremors    Other hypertrophic disorders of the skin    Other viral warts    Poikiloderma of [place]    Rash and other nonspecific skin eruption    Renal cyst, right    Right groin pain    Urinary frequency    Ureteral calculus    BMI 33.0-33.9,adult    Former smoker       Assessment:         69-year-old gentleman seen evaluated bedside in the telemetry and.    Bedside examination evaluation were performed by me.    Chart reviewed in details cussed the patient at length.    Impression:  ASHD class  II  Catheterization March 2000 23 to 50% LAD 50% PDA  Normal LV function  Hypertension  Hyperlipidemia  Obstructive sleep apnea  BPH  Cholelithiasis  Diverticulosis  Plan:   Recommendation:  Under the circumstances patient and I discussed repeat invasive testing to rule out any progression of disease causing symptomatology for which he agrees  All risk complication alternatives were discussed  Will continue medical therapy and telemetry  Recheck EKG and lab  Catheterization tomorrow with disposition to follow  Ultimate follow-up with Dr. Durand      Thank you for the opportunity to participate in the care of your patient.  Please do not hesitate to call if you have any questions.    Electronically signed by Darrell Maxwell DO, on 1/12/2025 at 10:18 AM

## 2025-01-12 NOTE — H&P (VIEW-ONLY)
Cardiology Consult Note      Date:   1/12/2025  Patient name:  Demetrio Malave  Date of admission:  1/11/2025  1:11 PM  MRN:   [id number]  YOB: 1955  Time of Consult:  10:18 AM    Consulting Cardiologist: Dr. Darrell Maxwell      Primary Cardiologist:  Dr. Darrell Durand    Referring Provider: Dr. wolff      Admission Diagnosis:     Chest pain      History of Present Illness:      Demetrio Malave is a 69 y.o.  male patient who is being at the request of Dr. Wolff for inpatient consultation of chest pain angina.  He was admitted on 1/11/2025.  Previous [facility] and [facility] records have been reviewed in detail.      This is a 69-year-old gentleman who returns to the hospital with chest pain type syndrome suggestive of angina pectoris.  He had undergone heart catheterization in March 2023 showing moderate coronary disease with normal LV function.  He follows regular with Dr. Durand.  He is retired.  He says he started to develop symptomatology which became worse over the last day or 2 so he came to the emergency room.  Initial EKG and labs showed no significant abnormalities.  He has been observed.  He still having some recurrent symptoms at this time.  Does have a history of hypertension hyperlipidemia and some prostate issues in the past.  He is a former smoker.  Again he is retired and is on usual medications in the outpatient setting under the care of Dr. Durand.  Allergies:     No Known Allergies      Past Medical History:     Past Medical History:   Diagnosis Date    Coronary artery disease     Hyperlipidemia     Hypertension     Prostatitis        Past Surgical History:     Past Surgical History:   Procedure Laterality Date    CT ANGIO CORONARY ART WITH HEARTFLOW IF SCORE >30%  2/22/2023    CT HEART CORONARY ANGIOGRAM ELY [id number] CT    HERNIA REPAIR  02/16/2017    Hernia Repair    OTHER SURGICAL HISTORY  12/17/2018    Shoulder surgery       Family History:     Family History   Problem Relation Name  Age of Onset    Leukemia Father      Colon cancer Father         Social History:     Social History     Tobacco Use    Smoking status: Former     Types: Cigars     Quit date:      Years since quittin.0    Smokeless tobacco: Never   Substance Use Topics    Alcohol use: Not Currently    Drug use: Never       CURRENT MEDICATIONS    amLODIPine, 5 mg, oral, Daily  aspirin, 81 mg, oral, Daily  atorvastatin, 80 mg, oral, Nightly  heparin (porcine), 5,000 Units, subcutaneous, q8h  isosorbide mononitrate ER, 30 mg, oral, q24h         Current Outpatient Medications   Medication Instructions    amLODIPine (NORVASC) 5 mg, oral, Daily    aspirin 81 mg, oral, Daily    atorvastatin (LIPITOR) 40 mg, oral, Daily    isosorbide mononitrate ER (IMDUR) 30 mg, oral, Every 24 hours    mometasone (Elocon) 0.1 % ointment Topical, Daily    nitroglycerin (NITROSTAT) 0.4 mg, sublingual, Every 5 min PRN, as directed        Review of Systems:      12 point review of systems was obtained in detail and is negative other than that detailed above.    Vital Signs:     Vitals:    25 2045 25 2136 25 0500 25 0725   BP: 135/89 133/74 136/76 119/66   BP Location: Left arm Left arm Right arm    Patient Position: Sitting Sitting Sitting    Pulse: 62 56 58    Resp: (!)  18     Temp:  36.2 °C (97.2 °F) 36.3 °C (97.3 °F) 36.4 °C (97.5 °F)   TempSrc:  Temporal Temporal    SpO2: 96% 96% 96% 96%   Weight:       Height:         No intake or output data in the 24 hours ending 25 1018    Wt Readings from Last 4 Encounters:   25 95.3 kg (210 lb)   10/28/24 100 kg (220 lb 7.4 oz)   10/25/24 99.3 kg (219 lb)   24 96.9 kg (213 lb 9.6 oz)       Physical Examination:     GENERAL APPEARANCE: Well developed, well nourished, in no acute distress.  CHEST: Symmetric and non-tender.  INTEGUMENT: Skin warm and dry, without gross excoriationis or lesions.  HEENT: No gross abnormalities of conjunctiva, teeth, gums, oral  "mucosa  NECK: Supple, no JVD, no bruit. Thyroid not palpable. Carotid upstrokes normal.  NEURO/PSHCY: Alert and oriented x3; appropriate behavior and responses and responses, grossly normal cerebellar function with normal balance and coordination  LUNGS: Clear to auscultation bilaterally; normal respiratory effort.  HEART: Rate and rhythm regular with no evident murmur; no gallop appreciated. There are no rubs, clicks or heaves. PMI nondisplaced.  ABDOMEN: Soft, nontender, no palpable hepatosplenomegaly, no mases, no bruits. Abdominal aorta not noted to be enlarged.  MUSCULOSKELETAL: Ambulatory with normal tandem gait.  EXTREMITIES: Warm with good color, no clubbing or cyanois. There is no edema noted.  PERIPHERAL VASCULAR: Pulses present and equally palpable; 2+ throughout. No femoral bruits.      Lab:     CBC:   Lab Results   Component Value Date    WBC 6.4 01/12/2025    RBC 4.54 01/12/2025    HGB 13.7 01/12/2025    HCT 40.2 (L) 01/12/2025     01/12/2025        CMP:    Lab Results   Component Value Date     01/12/2025    K 4.0 01/12/2025     01/12/2025    CO2 25 01/12/2025    BUN 17 01/12/2025    CREATININE 0.94 01/12/2025    GLUCOSE 99 01/12/2025    CALCIUM 8.9 01/12/2025       Magnesium:    Lab Results   Component Value Date    MG 2.08 01/12/2025       Lipid Profile:    No results found for: \"CHLPL\", \"TRIG\", \"HDL\", \"LDLCALC\", \"LDLDIRECT\"    TSH:    No results found for: \"TSH\"    BNP:   Lab Results   Component Value Date    BNP 20 01/11/2025        PT/INR:    No results found for: \"PROTIME\", \"INR\"    HgBA1c:    Lab Results   Component Value Date    HGBA1C 5.2 10/25/2023       BMP:  Lab Results   Component Value Date     01/12/2025     01/11/2025    K 4.0 01/12/2025    K 4.1 01/11/2025     01/12/2025     01/11/2025    CO2 25 01/12/2025    CO2 26 01/11/2025    BUN 17 01/12/2025    BUN 16 01/11/2025    CREATININE 0.94 01/12/2025    CREATININE 0.86 01/11/2025       CBC:  Lab " Results   Component Value Date    WBC 6.4 01/12/2025    WBC 6.6 01/11/2025    RBC 4.54 01/12/2025    RBC 4.80 01/11/2025    HGB 13.7 01/12/2025    HGB 14.4 01/11/2025    HCT 40.2 (L) 01/12/2025    HCT 42.7 01/11/2025    MCV 89 01/12/2025    MCV 89 01/11/2025    MCH 30.2 01/12/2025    MCH 30.0 01/11/2025    MCHC 34.1 01/12/2025    MCHC 33.7 01/11/2025    RDW 12.0 01/12/2025    RDW 11.9 01/11/2025     01/12/2025     01/11/2025       Cardiac Enzymes:    Lab Results   Component Value Date    TROPHS 5 01/11/2025    TROPHS 5 01/11/2025    TROPHS 5 02/02/2024       Hepatic Function Panel:    Lab Results   Component Value Date    ALKPHOS 70 01/11/2025    ALT 25 01/11/2025    AST 18 01/11/2025    PROT 7.2 01/11/2025    BILITOT 1.1 01/11/2025       Diagnostic Studies:     ECG 12 lead    Result Date: 1/11/2025  Sinus bradycardia Low voltage QRS Borderline ECG When compared with ECG of 11-JAN-2025 13:08, (unconfirmed) No significant change was found    XR chest 1 view    Result Date: 1/11/2025  STUDY: Chest Radiograph;  1/11/2025 2:02 PM INDICATION: Chest pain. COMPARISON: XR chest 2/2/2024.  CT abdomen and pelvis 10/21/2024 ACCESSION NUMBER(S): [id number] ORDERING CLINICIAN: DARLENE CERRATO TECHNIQUE:  Frontal chest was obtained at 14:01 hours. FINDINGS: CARDIOMEDIASTINAL SILHOUETTE: Cardiomediastinal silhouette is top normal in size accentuated by slightly lower lung volumes.  LUNGS: Lungs are clear.  ABDOMEN: No acute upper abdominal findings.  A small density in the left of upper abdomen adjacent to the spine appears to be related to summation artifact.  BONES: No acute osseous changes.    Top normal heart size with no signs of active pulmonary parenchymal infiltration. Signed by Prema Laakso, DO    ECG 12 lead    Result Date: 1/11/2025  Normal sinus rhythm Left axis deviation Abnormal ECG When compared with ECG of 02-FEB-2024 12:36, Incomplete right bundle branch block is no longer Present    Cardiac Cath  Transition of Care Summary:  Post Procedure Diagnosis: Double vessel disease.  Blood Loss:               Estimated blood loss during the procedure was 0 mls.  Specimens Removed:        Number of specimen(s) removed: none.     ____________________________________________________________________________________  CONCLUSIONS:  1. The entire Left Main: 0% stenosis.  2. Mid LAD Lesion: The percent stenosis is 50%.  3. Prox 1st Diag LAD Lesion: The percent stenosis is 30%.  4. Mid CX Lesion: The percent stenosis is 10-30%.  5. Mid 2nd OM CX Lesion: The percent stenosis is 10-30 %.  6. Proximal RCA Lesion: The percent stenosis is 40%.  7. Mid PDA RCA Lesion: The percent stenosis is 50 %.  8. The Left Ventricular Ejection Fraction is 55%.     ____________________________________________________________________________________  CPT Codes:  Left Heart Cath (visualization of coronaries) and LV-30472; Moderate Sedation Services initial 15 minutes patient >5 [id number]     ICD 10 Codes:  I25.119-Atherosclerotic heart disease of native coronary artery with unspecified angina pectoris     [id number] Jerald Mauricio MD  Performing Physician  Electronically signed by [id number] Jerald Mauricio MD on 3/2/2023 at 9:15:47  AM        cc Report to: JERALD MAURICIO     cc Report to: [id number] Jerald Mauricio MD     cc Report to: [place] [id number]   Tel [phone] Fax [phone]     TRANSTHORACIC ECHOCARDIOGRAM REPORT        Patient Name:     RAE Hayes Physician:  [id number] Braden Moore MD  Study Date:       1/30/2023           Referring           BRIGITTE MARTELL                                        Physician:  MRN/PID:          [id number]            PCP:  Accession/Order#: [id number]           Department          [facility] Echo                                        Location:            Lab  YOB: 1955           Fellow:  Gender:           M                   Nurse:              Demetrio Mcclure RN  Admit Date:       1/29/2023           Sonographer:        Marnie Henao                                                            [facility]  Admission Status: Observation -       Additional Staff:                    Priority discharge  Height:           170.00 cm           CC Report to:       [facility]  Weight:           101.01 kg           Study Type:         Echocardiogram  BSA:              2.11 m2  Blood Pressure: 145 /93 mmHg     Diagnosis/ICD: R07.89-Other chest pain; R01.1-Cardiac murmur, unspecified  Indication:    Chest Pain  Procedure/CPT: Echo Complete w Full [id number]     Patient History:  Smoker:            Former.  Pertinent History: Cancer.     Study Detail: The following Echo studies were performed: 2D, M-Mode, Doppler and                color flow. Agitated saline used as a contrast agent for                intraseptal flow evaluation.        PHYSICIAN INTERPRETATION:  Left Ventricle: Left ventricular systolic function is normal, with an estimated ejection fraction of 60-65%. There are no regional wall motion abnormalities. The left ventricular cavity size is normal. Spectral Doppler shows an impaired relaxation pattern of left ventricular diastolic filling.  Left Atrium: The left atrium is normal in size. A bubble study using agitated saline was performed. Bubble study is negative.  Right Ventricle: The right ventricle is normal in size. There is normal right ventricular global systolic function.  Right Atrium: The right atrium is normal in size.  Aortic Valve: The aortic valve is trileaflet. There is mild aortic valve cusp calcification. There is no evidence of aortic valve regurgitation. The peak instantaneous gradient of the aortic valve is 10.9 mmHg. The mean gradient of the aortic valve is 6.0 mmHg.  Mitral Valve: The mitral valve is mildly  thickened. There is mild mitral valve regurgitation.  Tricuspid Valve: The tricuspid valve is structurally normal. There is trace tricuspid regurgitation.  Pulmonic Valve: The pulmonic valve is not well visualized. There is no indication of pulmonic valve regurgitation.  Pericardium: There is no pericardial effusion noted.  Aorta: The aortic root is normal.        CONCLUSIONS:   1. Left ventricular systolic function is normal with a 60-65% estimated ejection fraction.   2. Spectral Doppler shows an impaired relaxation pattern of left ventricular diastolic filling.   3. Negative bubble study.  Radiology:     XR chest 1 view   Final Result   Top normal heart size with no signs of active pulmonary parenchymal   infiltration.   Signed by Prema Dumont DO          Problem List:     Patient Active Problem List   Diagnosis    Abnormal computed tomography angiography of heart    Actinic keratosis    Other seborrheic keratosis    Arthritis    BPH associated with nocturia    Benign essential hypertension    CAD (coronary artery disease)    Chest pain    Chronic rhinitis    Chronic throat clearing    Cluster headaches    Colon polyps    Epidermal cyst    Epididymitis    GERD without esophagitis    Hemangioma of skin and subcutaneous tissue    Hyperlipidemia    Infected sebaceous cyst    Neoplasm of uncertain behavior of skin    Nocturnal polyuria    Obesity, Class I, BMI 30-34.9    Obstructive sleep apnea    Occasional tremors    Other hypertrophic disorders of the skin    Other viral warts    Poikiloderma of [place]    Rash and other nonspecific skin eruption    Renal cyst, right    Right groin pain    Urinary frequency    Ureteral calculus    BMI 33.0-33.9,adult    Former smoker       Assessment:         69-year-old gentleman seen evaluated bedside in the telemetry and.    Bedside examination evaluation were performed by me.    Chart reviewed in details cussed the patient at length.    Impression:  ASHD class  II  Catheterization March 2000 23 to 50% LAD 50% PDA  Normal LV function  Hypertension  Hyperlipidemia  Obstructive sleep apnea  BPH  Cholelithiasis  Diverticulosis  Plan:   Recommendation:  Under the circumstances patient and I discussed repeat invasive testing to rule out any progression of disease causing symptomatology for which he agrees  All risk complication alternatives were discussed  Will continue medical therapy and telemetry  Recheck EKG and lab  Catheterization tomorrow with disposition to follow  Ultimate follow-up with Dr. Durand      Thank you for the opportunity to participate in the care of your patient.  Please do not hesitate to call if you have any questions.    Electronically signed by Darrell Maxwell DO, on 1/12/2025 at 10:18 AM

## 2025-01-13 VITALS
HEART RATE: 67 BPM | TEMPERATURE: 97.7 F | SYSTOLIC BLOOD PRESSURE: 118 MMHG | BODY MASS INDEX: 33.75 KG/M2 | HEIGHT: 66 IN | RESPIRATION RATE: 18 BRPM | OXYGEN SATURATION: 93 % | DIASTOLIC BLOOD PRESSURE: 63 MMHG | WEIGHT: 210 LBS

## 2025-01-13 LAB
ANION GAP SERPL CALC-SCNC: 10 MMOL/L (ref 10–20)
BUN SERPL-MCNC: 15 MG/DL (ref 6–23)
CALCIUM SERPL-MCNC: 9 MG/DL (ref 8.6–10.3)
CHLORIDE SERPL-SCNC: 106 MMOL/L (ref 98–107)
CO2 SERPL-SCNC: 27 MMOL/L (ref 21–32)
CREAT SERPL-MCNC: 0.87 MG/DL (ref 0.5–1.3)
EGFRCR SERPLBLD CKD-EPI 2021: >90 ML/MIN/1.73M*2
ERYTHROCYTE [DISTWIDTH] IN BLOOD BY AUTOMATED COUNT: 12 % (ref 11.5–14.5)
GLUCOSE SERPL-MCNC: 93 MG/DL (ref 74–99)
HCT VFR BLD AUTO: 40.8 % (ref 41–52)
HGB BLD-MCNC: 13.7 G/DL (ref 13.5–17.5)
HOLD SPECIMEN: NORMAL
MAGNESIUM SERPL-MCNC: 2.18 MG/DL (ref 1.6–2.4)
MCH RBC QN AUTO: 30 PG (ref 26–34)
MCHC RBC AUTO-ENTMCNC: 33.6 G/DL (ref 32–36)
MCV RBC AUTO: 89 FL (ref 80–100)
NRBC BLD-RTO: 0 /100 WBCS (ref 0–0)
PLATELET # BLD AUTO: 178 X10*3/UL (ref 150–450)
POTASSIUM SERPL-SCNC: 3.8 MMOL/L (ref 3.5–5.3)
RBC # BLD AUTO: 4.57 X10*6/UL (ref 4.5–5.9)
SODIUM SERPL-SCNC: 139 MMOL/L (ref 136–145)
WBC # BLD AUTO: 5.7 X10*3/UL (ref 4.4–11.3)

## 2025-01-13 PROCEDURE — C1894 INTRO/SHEATH, NON-LASER: HCPCS | Performed by: INTERNAL MEDICINE

## 2025-01-13 PROCEDURE — 4A023N7 MEASUREMENT OF CARDIAC SAMPLING AND PRESSURE, LEFT HEART, PERCUTANEOUS APPROACH: ICD-10-PCS | Performed by: INTERNAL MEDICINE

## 2025-01-13 PROCEDURE — 7100000001 HC RECOVERY ROOM TIME - INITIAL BASE CHARGE: Performed by: INTERNAL MEDICINE

## 2025-01-13 PROCEDURE — 7100000009 HC PHASE TWO TIME - INITIAL BASE CHARGE: Performed by: INTERNAL MEDICINE

## 2025-01-13 PROCEDURE — 85027 COMPLETE CBC AUTOMATED: CPT

## 2025-01-13 PROCEDURE — 99024 POSTOP FOLLOW-UP VISIT: CPT | Performed by: NURSE PRACTITIONER

## 2025-01-13 PROCEDURE — 7100000010 HC PHASE TWO TIME - EACH INCREMENTAL 1 MINUTE: Performed by: INTERNAL MEDICINE

## 2025-01-13 PROCEDURE — 99233 SBSQ HOSP IP/OBS HIGH 50: CPT | Performed by: INTERNAL MEDICINE

## 2025-01-13 PROCEDURE — 99239 HOSP IP/OBS DSCHRG MGMT >30: CPT

## 2025-01-13 PROCEDURE — 2500000001 HC RX 250 WO HCPCS SELF ADMINISTERED DRUGS (ALT 637 FOR MEDICARE OP): Performed by: NURSE PRACTITIONER

## 2025-01-13 PROCEDURE — 7100000002 HC RECOVERY ROOM TIME - EACH INCREMENTAL 1 MINUTE: Performed by: INTERNAL MEDICINE

## 2025-01-13 PROCEDURE — 2720000007 HC OR 272 NO HCPCS: Performed by: INTERNAL MEDICINE

## 2025-01-13 PROCEDURE — 99152 MOD SED SAME PHYS/QHP 5/>YRS: CPT | Performed by: INTERNAL MEDICINE

## 2025-01-13 PROCEDURE — 83735 ASSAY OF MAGNESIUM: CPT

## 2025-01-13 PROCEDURE — C1887 CATHETER, GUIDING: HCPCS | Performed by: INTERNAL MEDICINE

## 2025-01-13 PROCEDURE — B2111ZZ FLUOROSCOPY OF MULTIPLE CORONARY ARTERIES USING LOW OSMOLAR CONTRAST: ICD-10-PCS | Performed by: INTERNAL MEDICINE

## 2025-01-13 PROCEDURE — 93458 L HRT ARTERY/VENTRICLE ANGIO: CPT | Performed by: INTERNAL MEDICINE

## 2025-01-13 PROCEDURE — 80048 BASIC METABOLIC PNL TOTAL CA: CPT

## 2025-01-13 PROCEDURE — 2500000004 HC RX 250 GENERAL PHARMACY W/ HCPCS (ALT 636 FOR OP/ED): Performed by: INTERNAL MEDICINE

## 2025-01-13 PROCEDURE — 2500000004 HC RX 250 GENERAL PHARMACY W/ HCPCS (ALT 636 FOR OP/ED): Performed by: NURSE PRACTITIONER

## 2025-01-13 PROCEDURE — 2550000001 HC RX 255 CONTRASTS: Performed by: INTERNAL MEDICINE

## 2025-01-13 PROCEDURE — 36415 COLL VENOUS BLD VENIPUNCTURE: CPT

## 2025-01-13 RX ORDER — MIDAZOLAM HYDROCHLORIDE 1 MG/ML
INJECTION, SOLUTION INTRAMUSCULAR; INTRAVENOUS AS NEEDED
Status: DISCONTINUED | OUTPATIENT
Start: 2025-01-13 | End: 2025-01-13 | Stop reason: HOSPADM

## 2025-01-13 RX ORDER — LIDOCAINE HYDROCHLORIDE 20 MG/ML
INJECTION, SOLUTION INFILTRATION; PERINEURAL AS NEEDED
Status: DISCONTINUED | OUTPATIENT
Start: 2025-01-13 | End: 2025-01-13 | Stop reason: HOSPADM

## 2025-01-13 RX ORDER — NITROGLYCERIN 5 MG/ML
INJECTION, SOLUTION INTRAVENOUS AS NEEDED
Status: DISCONTINUED | OUTPATIENT
Start: 2025-01-13 | End: 2025-01-13 | Stop reason: HOSPADM

## 2025-01-13 RX ORDER — HEPARIN SODIUM 1000 [USP'U]/ML
INJECTION, SOLUTION INTRAVENOUS; SUBCUTANEOUS AS NEEDED
Status: DISCONTINUED | OUTPATIENT
Start: 2025-01-13 | End: 2025-01-13 | Stop reason: HOSPADM

## 2025-01-13 RX ORDER — FENTANYL CITRATE 50 UG/ML
INJECTION, SOLUTION INTRAMUSCULAR; INTRAVENOUS AS NEEDED
Status: DISCONTINUED | OUTPATIENT
Start: 2025-01-13 | End: 2025-01-13 | Stop reason: HOSPADM

## 2025-01-13 RX ADMIN — HEPARIN SODIUM 5000 UNITS: 5000 INJECTION INTRAVENOUS; SUBCUTANEOUS at 06:14

## 2025-01-13 RX ADMIN — AMLODIPINE BESYLATE 5 MG: 5 TABLET ORAL at 08:03

## 2025-01-13 RX ADMIN — ASPIRIN 81 MG: 81 TABLET, CHEWABLE ORAL at 08:03

## 2025-01-13 RX ADMIN — ISOSORBIDE MONONITRATE 30 MG: 30 TABLET, EXTENDED RELEASE ORAL at 08:03

## 2025-01-13 ASSESSMENT — COGNITIVE AND FUNCTIONAL STATUS - GENERAL
DAILY ACTIVITIY SCORE: 24
MOBILITY SCORE: 24

## 2025-01-13 ASSESSMENT — PAIN SCALES - GENERAL
PAINLEVEL_OUTOF10: 0 - NO PAIN

## 2025-01-13 NOTE — NURSING NOTE
Patient transferring back to 9S post Recovery. Report called to Wendi [place] and patient placed in Patient Movement for transport back to floor.  On transfer, patient's Right Radial is stable.  Dressing is clean/dry/intact; site is soft with no oozing or hematoma noted.

## 2025-01-13 NOTE — CARE PLAN
The patient's goals for the shift include no occurence of chest pain within the whole shift    The clinical goals for the shift include patieont will remain free from injury    Problem: Pain  Goal: Takes deep breaths with improved pain control throughout the shift  Outcome: Adequate for Discharge

## 2025-01-13 NOTE — NURSING NOTE
Patient ambulated in halls and used the bathroom.  No complaints of dizziness or light-headedness after ambulating.  Right Radial remains stable after ambulation.

## 2025-01-13 NOTE — PROGRESS NOTES
[facility] Heart Progress Note           Rounding TARA/Cardiologist:  Darrell Maxwell DO, Dr. Darrell Maxwell  Primary Cardiologist: Dr. Darrell Durand    Date:  1/13/2025  Patient:  Demetrio Malave  YOB: 1955  MRN:  [id number]   Admit Date:  1/11/2025      SUBJECTIVE:    Demetrio Malave was seen and examined today at bedside.  He is sitting in reclining chair.  His wife is at the bedside.  He he denies any chest pain or shortness of breath overnight.  He is awaiting cardiac catheterization to be performed today.  Questions regarding procedure were answered.  Review of telemetry reveals sinus bradycardia to sinus rhythm, lowest heart rate noted 45 bpm.  Heart rate averaged between 50s and 60s.    VITALS:     Vitals:    01/12/25 1507 01/12/25 2000 01/13/25 0500 01/13/25 0728   BP: 93/53 119/56 110/78 125/72   BP Location:  Right arm Right arm Left arm   Patient Position:  Sitting Sitting Sitting   Pulse: 58 63 61 62   Resp:  18 18 16   Temp: 36.2 °C (97.2 °F) 36.2 °C (97.2 °F) 35.1 °C (95.2 °F) 36.2 °C (97.2 °F)   TempSrc:  Temporal Temporal Temporal   SpO2: 95% 96% 95% 94%   Weight:       Height:         No intake or output data in the 24 hours ending 01/13/25 0945    Wt Readings from Last 4 Encounters:   01/11/25 95.3 kg (210 lb)   10/28/24 100 kg (220 lb 7.4 oz)   10/25/24 99.3 kg (219 lb)   04/22/24 96.9 kg (213 lb 9.6 oz)       CURRENT HOSPITAL MEDICATIONS:   amLODIPine, 5 mg, oral, Daily  aspirin, 81 mg, oral, Daily  atorvastatin, 80 mg, oral, Nightly  heparin (porcine), 5,000 Units, subcutaneous, q8h  isosorbide mononitrate ER, 30 mg, oral, q24h         Current Outpatient Medications   Medication Instructions    amLODIPine (NORVASC) 5 mg, oral, Daily    aspirin 81 mg, oral, Daily    atorvastatin (LIPITOR) 40 mg, oral, Daily    isosorbide mononitrate ER (IMDUR) 30 mg, oral, Every 24 hours    [facility] (Elocon) 0.1 % ointment Topical, Daily    nitroglycerin (NITROSTAT) 0.4 mg, sublingual, Every  5 min PRN, as directed        PHYSICAL EXAMINATION:   GENERAL:  Well developed, well nourished, in no acute distress.  CHEST:  Symmetric and nontender.  NEURO/PSYCH:  Alert and oriented times three with approppriate behavior and responses.  NECK:  Supple, no JVD, no bruit.  LUNGS:  Clear to auscultation bilaterally, normal respiratory effort.  HEART:  Rate and rhythm regular no murmur, no gallop appreciated.   EXTREMITIES:  Warm with good color, no clubbing or cyanosis.  No lower extremity edema noted.  PERIPHERAL VASCULAR:  Pulses present and equally palpable; 2+ radial pulse bilateral    LAB DATA:     CBC:   Results from last 7 days   Lab Units 01/13/25  0635 01/12/25  0836 01/11/25  1347   WBC AUTO x10*3/uL 5.7 6.4 6.6   RBC AUTO x10*6/uL 4.57 4.54 4.80   HEMOGLOBIN g/dL 13.7 13.7 14.4   HEMATOCRIT % 40.8* 40.2* 42.7   MCV fL 89 89 89   MCH pg 30.0 30.2 30.0   MCHC g/dL 33.6 34.1 33.7   RDW % 12.0 12.0 11.9   PLATELETS AUTO x10*3/uL 178 171 184     CMP:    Results from last 7 days   Lab Units 01/13/25  0635 01/12/25  0836 01/11/25  1347   SODIUM mmol/L 139 137 139   POTASSIUM mmol/L 3.8 4.0 4.1   CHLORIDE mmol/L 106 107 106   CO2 mmol/L 27 25 26   BUN mg/dL 15 17 16   CREATININE mg/dL 0.87 0.94 0.86   GLUCOSE mg/dL 93 99 124*   PROTEIN TOTAL g/dL  --   --  7.2   CALCIUM mg/dL 9.0 8.9 9.6   BILIRUBIN TOTAL mg/dL  --   --  1.1   ALK PHOS U/L  --   --  70   AST U/L  --   --  18   ALT U/L  --   --  25     BMP:    Results from last 7 days   Lab Units 01/13/25  0635 01/12/25  0836 01/11/25  1347   SODIUM mmol/L 139 137 139   POTASSIUM mmol/L 3.8 4.0 4.1   CHLORIDE mmol/L 106 107 106   CO2 mmol/L 27 25 26   BUN mg/dL 15 17 16   CREATININE mg/dL 0.87 0.94 0.86   CALCIUM mg/dL 9.0 8.9 9.6   GLUCOSE mg/dL 93 99 124*     Magnesium:  Results from last 7 days   Lab Units 01/13/25  0635 01/12/25  0836 01/11/25  1347   MAGNESIUM mg/dL 2.18 2.08 2.15     Troponin:    Results from last 7 days   Lab Units 01/11/25  1538  01/11/25  1347   TROPHS ng/L 5 5     BNP:   Results from last 7 days   Lab Units 01/11/25  1347   BNP pg/mL 20     Lipid Panel:         DIAGNOSTIC TESTING:   @No results found for this or any previous visit.    ECG 12 lead    Result Date: 1/11/2025  Sinus bradycardia Low voltage QRS Borderline ECG When compared with ECG of 11-JAN-2025 13:08, (unconfirmed) No significant change was found    XR chest 1 view    Result Date: 1/11/2025  STUDY: Chest Radiograph;  1/11/2025 2:02 PM INDICATION: Chest pain. COMPARISON: XR chest 2/2/2024.  CT abdomen and pelvis 10/21/2024 ACCESSION NUMBER(S): [id number] ORDERING CLINICIAN: DARLENE CERRATO TECHNIQUE:  Frontal chest was obtained at 14:01 hours. FINDINGS: CARDIOMEDIASTINAL SILHOUETTE: Cardiomediastinal silhouette is top normal in size accentuated by slightly lower lung volumes.  LUNGS: Lungs are clear.  ABDOMEN: No acute upper abdominal findings.  A small density in the left of upper abdomen adjacent to the spine appears to be related to summation artifact.  BONES: No acute osseous changes.    Top normal heart size with no signs of active pulmonary parenchymal infiltration. Signed by Prema Dumont DO    ECG 12 lead    Result Date: 1/11/2025  Normal sinus rhythm Left axis deviation Abnormal ECG When compared with ECG of 02-FEB-2024 12:36, Incomplete right bundle branch block is no longer Present       XR chest 1 view   Final Result   Top normal heart size with no signs of active pulmonary parenchymal   infiltration.   Signed by Prema Dumont DO      Cardiac Catheterization Procedure    (Results Pending)       No echocardiogram results found for the past 14 days    RADIOLOGY:     XR chest 1 view   Final Result   Top normal heart size with no signs of active pulmonary parenchymal   infiltration.   Signed by Prema Dumont DO      Cardiac Catheterization Procedure    (Results Pending)       PROBLEM LIST     Patient Active Problem List   Diagnosis    Abnormal computed tomography  angiography of heart    Actinic keratosis    Other seborrheic keratosis    Arthritis    BPH associated with nocturia    Benign essential hypertension    CAD (coronary artery disease)    Chest pain    Chronic rhinitis    Chronic throat clearing    Cluster headaches    Colon polyps    Epidermal cyst    Epididymitis    GERD without esophagitis    Hemangioma of skin and subcutaneous tissue    Hyperlipidemia    Infected sebaceous cyst    Neoplasm of uncertain behavior of skin    Nocturnal polyuria    Obesity, Class I, BMI 30-34.9    Obstructive sleep apnea    Occasional tremors    Other hypertrophic disorders of the skin    Other viral warts    Poikiloderma of [place]    Rash and other nonspecific skin eruption    Renal cyst, right    Right groin pain    Urinary frequency    Ureteral calculus    BMI 33.0-33.9,adult    Former smoker    Chest pain, unspecified type       ASSESSMENT:   69-year-old gentleman seen evaluated bedside in telemetry and in conjunction with Griselda Lord RN, CNP.    Bedside examination evaluation were performed by me.    Chart was reviewed in detail discussed the patient and his wife.    Impression:    ASHD class II  Catheterization March 2023 to 50% LAD 50% PDA  Normal LV function  Hypertension  Hyperlipidemia  Obstructive sleep apnea  BPH  Cholelithiasis  Diverticulosis  Sinus bradycardia      PLAN:     Continue aspirin, high intensity statin, antianginals including amlodipine and Imdur  No beta-blocker due to baseline sinus bradycardia  Cardiac catheterization scheduled for today with further recommendations based on findings  Outpatient cardiology follow-up with primary cardiologist Dr. Kizzy aMxwell,DO,FACC      Griselda Lord APRN-CNP  [facility]    Of note, this documentation is completed using the Dragon Dictation system (voice recognition software). There may be spelling and/or grammatical errors that were not corrected prior to  final submission.    Electronically signed by Darrell Maxwell DO on 1/13/2025 at 9:45 AM

## 2025-01-13 NOTE — CARE PLAN
The patient's goals for the shift include no occurence of chest pain within the whole shift    The clinical goals for the shift include Patient will remain hemodynamically stable

## 2025-01-13 NOTE — DISCHARGE SUMMARY
Discharge Diagnosis  Chest pain secondary to unstable angina  CAD    Issues Requiring Follow-Up  Follow-up with cardiology in 2 weeks  Follow-up with PCP    Discharge Meds     Medication List      CONTINUE taking these medications     amLODIPine 5 mg tablet; Commonly known as: Norvasc; Take 1 tablet (5 mg)   by mouth once daily.   aspirin 81 mg chewable tablet; Chew 1 tablet (81 mg) once daily.   atorvastatin 40 mg tablet; Commonly known as: Lipitor; Take 1 tablet (40   mg) by mouth once daily.   isosorbide mononitrate ER 30 mg 24 hr tablet; Commonly known as: Imdur;   Take 1 tablet (30 mg) by mouth once every 24 hours.   mometasone 0.1 % ointment; Commonly known as: Elocon; Apply topically   once daily.   nitroglycerin 0.4 mg SL tablet; Commonly known as: Nitrostat; Place 1   tablet (0.4 mg) under the tongue every 5 minutes if needed for chest pain.   as directed       Test Results Pending At Discharge  Pending Labs       No current pending labs.            Hospital Course   This is a 69-year-old male with past medical history including CAD, HTN, and HLD who presented on 1/11 for chest pain secondary to unstable angina.  CXR unremarkable.  Troponin negative x 2, 5/5.  EKG without acute ischemic changes.  Telemetry consistently showing sinus bradycardia.  Patient was initiated on heparin subcu and home meds were resumed.  Cardiology consulted, performed LHC and found mild CAD with 40% stenosis in the mid LAD, 30% stenosis in the PDA, normal LVEF.  Medical management is advised at this time.  Recommend continuing current home cardiac regimen with amlodipine, aspirin, atorvastatin, and Imdur.  Patient will need to follow-up with Dr. Durand in 2 to 3 weeks.  He should also follow-up with his PCP within 1 week.  Discussed return precautions.  With treatment, the patient's symptoms have subsided.  Patient will be discharged home today.  He is hemodynamically stable at time of discharge.    Plan of care was discussed  extensively with patient.  Patient verbalized understanding through teach back method.  All question and concerns addressed upon examination.     Of note, this documentation is completed using the Dragon Dictation system (voice recognition software). There may be spelling and/or grammatical errors that were not corrected prior to final submission.      Pertinent Physical Exam At Time of Discharge  Physical Exam  Constitutional:       Appearance: Normal appearance. He is obese.   HENT:      Head: Normocephalic.      Mouth/Throat:      Mouth: Mucous membranes are moist.   Eyes:      Pupils: Pupils are equal, round, and reactive to light.   Cardiovascular:      Rate and Rhythm: Normal rate and regular rhythm.      Heart sounds: Normal heart sounds, S1 normal and S2 normal.   Pulmonary:      Effort: Pulmonary effort is normal.      Breath sounds: Normal breath sounds.   Abdominal:      General: Bowel sounds are normal.      Palpations: Abdomen is soft.      Tenderness: There is no abdominal tenderness.   Musculoskeletal:         General: Normal range of motion.      Cervical back: Neck supple.      Right lower leg: No edema.      Left lower leg: No edema.   Skin:     General: Skin is warm.   Neurological:      Mental Status: He is alert and oriented to person, place, and time.   Psychiatric:         Mood and Affect: Mood normal.         Behavior: Behavior normal.         Outpatient Follow-Up  Future Appointments   Date Time Provider Department Center   4/21/2025  3:30 PM Roxanne Gallo MD [id number] [place]   4/30/2025 11:00 AM Darrell Durand DO [id number] [place]   9/8/2025  1:00 PM Renny Rain MD [id number] [place]   11/10/2025  7:00 AM Rizwan Norris DO [id number] [place]         Maria Ines Castellanos PA-C  I spent 35 minutes on discharge. Time calculated includes bedside evaluation, counseling, and outpatient care coordination.

## 2025-01-13 NOTE — NURSING NOTE
Patient arrived in [facility] CVIU Room 208 from Cath Lab s/p [facility].  On arrival focused assessment completed and WDL.  Right Radial arterial sheath puncture site is stable.  VASC Band is on.  No oozing or hematoma noted.  Patient provided with cranberry juice, ginger-melvin and a vanilla pudding.  Patient's wife is bedside.

## 2025-01-13 NOTE — PROGRESS NOTES
01/13/25 1101   Discharge Planning   Living Arrangements Spouse/significant other   Support Systems Spouse/significant other   Assistance Needed pt independent in adls and shares iadls w/ spouse. currently brando to afford medications. pt has no medicare plan D. USes good rx or aarp discounts.   Type of Residence Private residence   Number of Stairs to Enter Residence 2   Number of Stairs Within Residence 0   Expected Discharge Disposition Home   Does the patient need discharge transport arranged? No     Pt confirms demo's ins and pcp. Pt and spouse both drive. Pt is scheduled for a LHC today. Spouse is present. Plan is home.

## 2025-01-13 NOTE — DISCHARGE INSTRUCTIONS
Follow up with cardiology in 2 weeks  Follow up with PCP in 1 week    Thank you for choosing [facility]. It has been a pleasure taking part in your medical care. Please follow up with your primary care provider as instructed. If your symptoms should persist or worsen, please contact your primary care physician, or in the case of an emergency proceed to the nearest Emergency Room for further care. If you have any questions about the care you received, please call [facility] at [phone]. Thank you again!                CARDIAC CATHETERIZATION DISCHARGE INSTRUCTIONS     FOR SUDDEN AND SEVERE CHEST PAIN, SHORTNESS OF BREATH, EXCESSIVE BLEEDING, SIGNS OF STROKE, OR CHANGES IN MENTAL STATUS YOU SHOULD CALL 911 IMMEDIATELY.     If your provider has prescribed aspirin and/or clopidogrel (Plavix), or prasugrel (Effient), or ticagrelor (Brilinta), DO NOT STOP THESE MEDICATIONS for any reason without talking to your cardiologist first. If any of these were prescribed, you must take them every day without missing a single dose. If you are getting low on these medications, contact your provider immediately for a refill.     FOR NEXT 24 HOURS (due to the Sedation)    - Upon discharge, you should return home and rest for the remainder of the day and evening. You do not have to stay on bed rest but should not be very active.  It is recommended a responsible adult be with you for the first 24 hours after the procedure.    - No driving for 24 hours after procedure. Please arrange for someone to drive you home from the hospital today.     - Do not operate machinery or use power tools for 24 hours after your procedure.     - Do not make any legal decisions for 24 hours after your procedure.     - Do not drink alcoholic beverages for 24 hours after your procedure.    WOUND CARE   *FOR RADIAL (WRIST) ACCESS*  ·      No lifting more than 5 pounds or excessive use of the wrist for 24 hours - for  example, treat your wrist as if it is sprained.  ·      Do not engage in vigorous activities (tennis, golf, bowling, weights) for at least 48 hours after the procedure.  ·      Do not submerge the wrist for 3-5 days after the procedure.  ·      You should expect mild tingling in your hand and tenderness at the puncture site for up to 3 days.    - The transparent dressing should be removed from the site 24 hours after the procedure.  Dressing can be removed 1/14/25 any time after 1:00 pm     - You may shower the day after your procedure. Wash the site gently with soap and water. Rinse well and pat dry. Keep the area clean and dry. You may apply a Band-Aid to the site. Avoid lotions, ointments, or powders until fully healed.  - It is normal to notice a small bruise around the puncture site and/or a small grape sized or smaller lump. Any large bruising or large lump warrants a call to the office.     - If bleeding should occur, sit or lie down, raise affected arm above heart level and apply pressure to the area for 10 minutes.  If the bleeding stops notify your physician.  If there is a large amount of bleeding or spurting of blood CALL 911 immediately.  DO NOT drive yourself to the hospital.    - You may experience some tenderness, bruising or minimal inflammation.  If you have any concerns or if any of these symptoms become excessive, contact your cardiologist or go to the emergency room.     OTHER INSTRUCTIONS    - You may take acetaminophen (Tylenol) as directed for discomfort.  If pain is not relieved with acetaminophen (Tylenol), contact your doctor.    - If you notice or experience any of the following, you should notify your doctor or seek medical attention  Chest pain or discomfort  Change in mental status or weakness in extremities.  Dizziness, light headedness, or feeling faint.  Change in the site where the procedure was performed, such as bleeding or an increased area of bruising or swelling.  Tingling,  numbness, pain, or coolness in the leg/arm beyond the site where the procedure was performed.  Signs of infection (i.e. shaking chills, temperature > 100 degrees Fahrenheit, warmth, redness) in the leg/arm area where the procedure was performed.  Changes in urination   Bloody or black stools  Vomiting blood  Severe nose bleeds  Any excessive bleeding    - If you DO NOT have an appointment with your cardiologist within 2-4 weeks following your procedure, please contact their office.

## 2025-01-13 NOTE — PROGRESS NOTES
"Daily Progress Note    Demetrio Malave is a 69 y.o. male on day 1 of admission presenting with Chest pain.    Subjective   Patient seen and examined, labs and vitals reviewed. Remains bradycardic. Patient states he feels well today. Denies CP, SOB, abd pain, n/v/d. Cardiology plans for [facility] today, further recs pending results.        Objective   Physical Exam  Constitutional:       Appearance: Normal appearance. He is obese.   HENT:      Head: Normocephalic.      Mouth/Throat:      Mouth: Mucous membranes are moist.   Eyes:      Extraocular Movements: Extraocular movements intact.      Pupils: Pupils are equal, round, and reactive to light.   Cardiovascular:      Rate and Rhythm: Regular rhythm. Bradycardia present.      Heart sounds: Normal heart sounds, S1 normal and S2 normal.   Pulmonary:      Effort: Pulmonary effort is normal.      Breath sounds: Normal breath sounds.   Abdominal:      General: Bowel sounds are normal.      Palpations: Abdomen is soft.   Musculoskeletal:         General: Normal range of motion.      Cervical back: Neck supple.      Right lower leg: No edema.      Left lower leg: No edema.   Skin:     General: Skin is warm.   Neurological:      Mental Status: He is alert and oriented to person, place, and time.   Psychiatric:         Mood and Affect: Mood normal.         Behavior: Behavior normal.         Last Recorded Vitals  Blood pressure 125/72, pulse 62, temperature 36.2 °C (97.2 °F), temperature source Temporal, resp. rate 16, height 1.676 m (5' 6\"), weight 95.3 kg (210 lb), SpO2 94%.  Intake/Output last 3 Shifts:  No intake/output data recorded.    Medications  Scheduled medications  amLODIPine, 5 mg, oral, Daily  aspirin, 81 mg, oral, Daily  atorvastatin, 80 mg, oral, Nightly  heparin (porcine), 5,000 Units, subcutaneous, q8h  isosorbide mononitrate ER, 30 mg, oral, q24h      Continuous medications     PRN medications  PRN medications: acetaminophen **OR** acetaminophen **OR** " acetaminophen, nitroglycerin    Labs  CBC:   Results from last 7 days   Lab Units 01/13/25  0635 01/12/25  0836 01/11/25  1347   WBC AUTO x10*3/uL 5.7 6.4 6.6   RBC AUTO x10*6/uL 4.57 4.54 4.80   HEMOGLOBIN g/dL 13.7 13.7 14.4   HEMATOCRIT % 40.8* 40.2* 42.7   MCV fL 89 89 89   MCH pg 30.0 30.2 30.0   MCHC g/dL 33.6 34.1 33.7   RDW % 12.0 12.0 11.9   PLATELETS AUTO x10*3/uL 178 171 184     CMP:    Results from last 7 days   Lab Units 01/13/25  0635 01/12/25  0836 01/11/25  1347   SODIUM mmol/L 139 137 139   POTASSIUM mmol/L 3.8 4.0 4.1   CHLORIDE mmol/L 106 107 106   CO2 mmol/L 27 25 26   BUN mg/dL 15 17 16   CREATININE mg/dL 0.87 0.94 0.86   GLUCOSE mg/dL 93 99 124*   PROTEIN TOTAL g/dL  --   --  7.2   CALCIUM mg/dL 9.0 8.9 9.6   BILIRUBIN TOTAL mg/dL  --   --  1.1   ALK PHOS U/L  --   --  70   AST U/L  --   --  18   ALT U/L  --   --  25     BMP:    Results from last 7 days   Lab Units 01/13/25  0635 01/12/25  0836 01/11/25  1347   SODIUM mmol/L 139 137 139   POTASSIUM mmol/L 3.8 4.0 4.1   CHLORIDE mmol/L 106 107 106   CO2 mmol/L 27 25 26   BUN mg/dL 15 17 16   CREATININE mg/dL 0.87 0.94 0.86   CALCIUM mg/dL 9.0 8.9 9.6   GLUCOSE mg/dL 93 99 124*     Magnesium:  Results from last 7 days   Lab Units 01/13/25  0635 01/12/25  0836 01/11/25  1347   MAGNESIUM mg/dL 2.18 2.08 2.15     Troponin:    Results from last 7 days   Lab Units 01/11/25  1538 01/11/25  1347   TROPHS ng/L 5 5     BNP:   Results from last 7 days   Lab Units 01/11/25  1347   BNP pg/mL 20     Lipid Panel:         Nutrition             Relevant Results  Results from last 7 days   Lab Units 01/13/25  0635 01/12/25  0836 01/11/25  1347   GLUCOSE mg/dL 93 99 124*     Lab Results   Component Value Date    HGBA1C 5.2 10/25/2023        Assessment/Plan    Chest pain  CAD  HTN/HLD  Sinus bradycardia  -TTE 1/2023 with LVEF 60-65%, impaired LV diastolic filling  -LHC 2/2023 showed double vessel disease, 50% LAD and 50% PDA stenosis  -CXR unremarkable  -Troponin  negative x 2, 5/5  -EKG without acute ischemic changes  -Tele monitoring, sinus bradycardia   -Daily ASA, statin, Imdur  -Cardiology consult, plan for LHC today, further recs after procedure  -Avoid beta blockers  -Daily CBC, BMP, Mg     DVTp: heparin SQ, SCDs  PLAN: anticipate home, hopeful for discharge later today pending [facility] results    Maria Ines Castellanos PA-C    Plan of care was discussed extensively with patient.  Patient verbalized understanding through teach back method.  All question and concerns addressed upon examination.    Of note, this documentation is completed using the Dragon Dictation system (voice recognition software). There may be spelling and/or grammatical errors that were not corrected prior to final submission.

## 2025-01-13 NOTE — POST-PROCEDURE NOTE
Physician Transition of Care Summary  Invasive Cardiovascular Lab    Procedure Date: 1/13/2025  Attending:    * Erik Morelos - Primary  Resident/Fellow/Other Assistant: Surgeons and Role:  * No surgeons found with a matching role *    Indications:   Pre-op Diagnosis      * Unstable angina pectoris (Multi) [I20.0]     * Coronary artery disease involving native coronary artery of native heart with unstable angina pectoris [I25.110]    Post-procedure diagnosis:   Post-op Diagnosis     * Unstable angina pectoris (Multi) [I20.0]     * Coronary artery disease involving native coronary artery of native heart with unstable angina pectoris [I25.110]    Procedure(s):   Left Heart Cath, With LV  36747 - UT CATH PLMT L HRT & ARTS W/NJX & ANGIO IMG S&I        Procedure Findings:   Mild coronary disease with about 40% stenosis of mid LAD and 30% stenosis of PDA, normal left ventricular function    Description of the Procedure:   Left heart catheterization coronary angiography left ventriculogram    Complications:   None    Stents/Implants:   Implants       No implant documentation for this case.            Anticoagulation/Antiplatelet Plan:   Intravenous heparin    Estimated Blood Loss:   * No values recorded between 1/13/2025 12:30 PM and 1/13/2025 12:59 PM *    Anesthesia: Moderate Sedation Anesthesia Staff: No anesthesia staff entered.    Any Specimen(s) Removed:   No specimens collected during this procedure.    Disposition:   Medical therapy      Electronically signed by: Erik Morelos MD, 1/13/2025 12:59 PM

## 2025-01-13 NOTE — NURSING NOTE
This nurse introduced self and role to patient and family, prepared and provided AVS, sat with both, started and completed discharge education, pt verbalized understanding, without further questions or concerns, agreeable and comfortable with discharge at this time, piv removed with catheter intact, no tele, pt states he has all of his belongings, declined transport, discharge complete.

## 2025-01-13 NOTE — NURSING NOTE
VASC Band removed.  Right Radial site is stable.  No oozing or hematoma noted.  Dressing applied.  Education started with patient with regards to site care and restrictions.  Patient was provided with a folder that includes his cardiac catherization pictures as well as a copy of his cardiac catherization discharge instructions.

## 2025-01-13 NOTE — PROGRESS NOTES
Patient is stable status post LHC under the care of Dr. Morelos.  Discussed results of procedure with patient and his wife.  Pictures were provided.  Findings of the LHC revealed mild coronary artery disease with 40% stenosis in the mid LAD, 30% stenosis in the PDA and a normal LVEF.  Medical management is advised.  Patient will be scheduled to follow-up with Dr. Durand in 2 to 3 weeks.  Postprocedural activity, restrictions, potential complications, medications and future follow-up discussed at length.  All questions answered.  Both verbalized understanding.

## 2025-01-14 ENCOUNTER — PATIENT OUTREACH (OUTPATIENT)
Dept: PRIMARY CARE | Facility: CLINIC | Age: 70
End: 2025-01-14
Payer: MEDICARE

## 2025-01-14 NOTE — PROGRESS NOTES
Discharge Facility: [facility]  Discharge Diagnosis: Chest pain secondary to unstable angina CAD  Admission Date: 1/11/2025  Discharge Date: 1/13/2025    PCP Appointment Date:  -Pt will be following up with cardiology    Specialist Appointment Date:   -2/10/2025 1230 cardiology    Hospital Encounter and Summary Linked: Yes    See discharge assessment below for further details    Engagement  Call Start Time: 0942 (1/14/2025  9:43 AM)    Medications  Does the patient have all medications ordered at discharge?: Not applicable (1/14/2025  9:43 AM)  Care Management Interventions: No intervention needed (1/14/2025  9:43 AM)  Is the patient taking all medications as directed (includes completed medication regime)?: Yes (1/14/2025  9:43 AM)    Appointments  Does the patient have a primary care provider?: Yes (1/14/2025  9:43 AM)  Has the patient kept scheduled appointments due by today?: Yes (1/14/2025  9:43 AM)    Self Management  Has home health visited the patient within 72 hours of discharge?: Not applicable (1/14/2025  9:43 AM)    Patient Teaching  Does the patient have access to their discharge instructions?: Yes (1/14/2025  9:43 AM)  What is the patient's perception of their health status since discharge?: Improving (1/14/2025  9:43 AM)  Is the patient/caregiver able to teach back the hierarchy of who to call/visit for symptoms/problems? PCP, Specialist, Home Health nurse, Urgent Care, ED, 911: Yes (1/14/2025  9:43 AM)    Wrap Up  Wrap Up Additional Comments: Pt was admitted to [facility] 1/11-1/13/2025 for chest pain. Pt s/p left heart cath 1/13. Pt reports feeling better since discharge. Pt discharged with no new prescritions or medication changes. Pt reports understanding of discharge instructions. Pt states he has follow up appt scheduled with cardiologist Dr. Durand 2/10/2025. Pt denies any questions, needs, or concerns at this time. He is encouraged to call if questions or needs arise. (1/14/2025   9:43 AM)  Call End Time: 0944 (1/14/2025  9:43 AM)

## 2025-01-20 ENCOUNTER — APPOINTMENT (OUTPATIENT)
Dept: PRIMARY CARE | Facility: CLINIC | Age: 70
End: 2025-01-20
Payer: MEDICARE

## 2025-01-23 LAB
ATRIAL RATE: 65 BPM
P AXIS: 63 DEGREES
P OFFSET: 177 MS
P ONSET: 119 MS
PR INTERVAL: 176 MS
Q ONSET: 207 MS
QRS COUNT: 11 BEATS
QRS DURATION: 106 MS
QT INTERVAL: 408 MS
QTC CALCULATION(BAZETT): 424 MS
QTC FREDERICIA: 418 MS
R AXIS: -39 DEGREES
T AXIS: 69 DEGREES
T OFFSET: 411 MS
VENTRICULAR RATE: 65 BPM

## 2025-01-24 LAB
ATRIAL RATE: 56 BPM
P AXIS: 57 DEGREES
P OFFSET: 190 MS
P ONSET: 135 MS
PR INTERVAL: 174 MS
Q ONSET: 222 MS
QRS COUNT: 9 BEATS
QRS DURATION: 106 MS
QT INTERVAL: 426 MS
QTC CALCULATION(BAZETT): 411 MS
QTC FREDERICIA: 416 MS
R AXIS: -18 DEGREES
T AXIS: 40 DEGREES
T OFFSET: 435 MS
VENTRICULAR RATE: 56 BPM

## 2025-01-27 ENCOUNTER — PATIENT OUTREACH (OUTPATIENT)
Dept: PRIMARY CARE | Facility: CLINIC | Age: 70
End: 2025-01-27
Payer: MEDICARE

## 2025-01-27 NOTE — PROGRESS NOTES
Post hospital discharge follow up call complete.   At time of outreach call the patient feels as if their condition has improved since last visit.    Verified upcoming cardiology appt 2/3/2025 1415.    Pt denies any current needs from PCP and reports he has all of his medication. Pt denies any questions, needs, or concerns at this time. He is encouraged to call if questions or needs arise.

## 2025-01-28 LAB
ALBUMIN SERPL-MCNC: 4.7 G/DL (ref 3.6–5.1)
ALP SERPL-CCNC: 74 U/L (ref 35–144)
ALT SERPL-CCNC: 28 U/L (ref 9–46)
ANION GAP SERPL CALCULATED.4IONS-SCNC: 8 MMOL/L (CALC) (ref 7–17)
AST SERPL-CCNC: 23 U/L (ref 10–35)
BILIRUB SERPL-MCNC: 1.6 MG/DL (ref 0.2–1.2)
BUN SERPL-MCNC: 15 MG/DL (ref 7–25)
CALCIUM SERPL-MCNC: 9.2 MG/DL (ref 8.6–10.3)
CHLORIDE SERPL-SCNC: 103 MMOL/L (ref 98–110)
CHOLEST SERPL-MCNC: 94 MG/DL
CHOLEST/HDLC SERPL: 2.7 (CALC)
CO2 SERPL-SCNC: 28 MMOL/L (ref 20–32)
CREAT SERPL-MCNC: 0.88 MG/DL (ref 0.7–1.35)
EGFRCR SERPLBLD CKD-EPI 2021: 94 ML/MIN/1.73M2
GLUCOSE SERPL-MCNC: 94 MG/DL (ref 65–99)
HDLC SERPL-MCNC: 35 MG/DL
LDLC SERPL CALC-MCNC: 45 MG/DL (CALC)
NONHDLC SERPL-MCNC: 59 MG/DL (CALC)
POTASSIUM SERPL-SCNC: 4.2 MMOL/L (ref 3.5–5.3)
PROT SERPL-MCNC: 6.6 G/DL (ref 6.1–8.1)
SODIUM SERPL-SCNC: 139 MMOL/L (ref 135–146)
TRIGL SERPL-MCNC: 60 MG/DL

## 2025-02-03 ENCOUNTER — APPOINTMENT (OUTPATIENT)
Dept: CARDIOLOGY | Facility: CLINIC | Age: 70
End: 2025-02-03
Payer: MEDICARE

## 2025-02-03 VITALS
WEIGHT: 213.7 LBS | HEIGHT: 66 IN | SYSTOLIC BLOOD PRESSURE: 116 MMHG | HEART RATE: 64 BPM | DIASTOLIC BLOOD PRESSURE: 78 MMHG | BODY MASS INDEX: 34.34 KG/M2

## 2025-02-03 DIAGNOSIS — Z87.891 FORMER SMOKER: ICD-10-CM

## 2025-02-03 DIAGNOSIS — I10 BENIGN ESSENTIAL HYPERTENSION: ICD-10-CM

## 2025-02-03 DIAGNOSIS — I25.10 CORONARY ARTERY DISEASE INVOLVING NATIVE CORONARY ARTERY OF NATIVE HEART, UNSPECIFIED WHETHER ANGINA PRESENT: Primary | ICD-10-CM

## 2025-02-03 DIAGNOSIS — G47.33 OBSTRUCTIVE SLEEP APNEA: ICD-10-CM

## 2025-02-03 DIAGNOSIS — E78.2 MIXED HYPERLIPIDEMIA: ICD-10-CM

## 2025-02-03 PROCEDURE — 1159F MED LIST DOCD IN RCRD: CPT | Performed by: INTERNAL MEDICINE

## 2025-02-03 PROCEDURE — 3074F SYST BP LT 130 MM HG: CPT | Performed by: INTERNAL MEDICINE

## 2025-02-03 PROCEDURE — 3078F DIAST BP <80 MM HG: CPT | Performed by: INTERNAL MEDICINE

## 2025-02-03 PROCEDURE — 1111F DSCHRG MED/CURRENT MED MERGE: CPT | Performed by: INTERNAL MEDICINE

## 2025-02-03 PROCEDURE — 99214 OFFICE O/P EST MOD 30 MIN: CPT | Performed by: INTERNAL MEDICINE

## 2025-02-03 PROCEDURE — 1123F ACP DISCUSS/DSCN MKR DOCD: CPT | Performed by: INTERNAL MEDICINE

## 2025-02-03 PROCEDURE — 3008F BODY MASS INDEX DOCD: CPT | Performed by: INTERNAL MEDICINE

## 2025-02-03 NOTE — PROGRESS NOTES
CARDIOLOGY OFFICE VISIT      CHIEF COMPLAINT  Chief Complaint   Patient presents with    Hospital Follow-up     From [facility]        HISTORY OF PRESENT ILLNESS  Patient is 69-year-old  male with past medical history significant for coronary artery disease, hypertension, hyperlipidemia, obstructive sleep apnea, BPH who presents for follow-up cardiovascular care.     Office visit 2024  Patient has infrequent brief chest pain unrelated to activity.  Denies dyspnea, palpitations, nausea, vomiting.  Occasional dizziness standing up without near syncope or manohar syncope.  Denies edema.  Currently drinks 34-64 ounces water daily.  Was seen in the ER for abdominal pain that lasted 2 days.  He had a CT that identified diverticulosis and cholelithiasis.  He is also had issues with nephrolithiasis.  He did not have follow-up with gastroenterology after this episode.  Patient has previously seen Dr. Jackson. He walks 3 times a week for 1 hour.    Office visit February 3, 2025  Patient had recurrent chest pain and went to the hospital.  He ultimately underwent cardiac cath and it revealed mild coronary artery disease medical therapy was advised.  Ejection fraction was reported 45%.  The patient never saw gastroenterology as I advised at his last visit with myself on 2024 to evaluate noncardiac etiology of his symptoms.  He currently is feeling better.  No further chest pain.  Denies abdominal pain, dyspnea, palpitations, nausea, vomiting.  He has occasional dizziness getting up without near syncope or manohar syncope.  Denies edema.     Past medical history:  As above     Past surgical history:  Right upper chest vascular trauma with repair 1975  Hernia repair x3     Social history:  Patient remotely smokes cigars quit 40 years ago  Rare alcohol use     Family history:  Father history of myocardial infarction  at age 72 from complications of diabetes and leukemia  Mother  in her late  80s     Review of systems have been reviewed and are negative, noncontributory, or as previous mentioned x12 systems.     I personally reviewed EKG January 20, 2023: Normal sinus rhythm     Assessment:  Noncardiac chest pain  Coronary artery disease mild on cardiac cath January 13, 2025  Hypertension  Hyperlipidemia  Obstructive sleep apnea  BPH  Cholelithiasis  Diverticulosis  Nephrolithiasis     Recommendations:  Patient appears to be stable from a cardiac standpoint. Symptoms have improved with medical therapy. No severe obstructive coronary artery disease on recent cardiac cath. Continue medical therapy as prescribed.  No evidence of heart failure.  No symptomatic arrhythmia.  I again advised that the patient follow-up with his gastroenterologist Dr Jackson to get input of recent abdominal pain and cholelithiasis identified on CT abdomen.  Patient would likely benefit from EGD also to evaluate chest pain.  Advise diet, weight loss, exercise program 30 minutes/day. Increase water intake to 64 ounces per day.  Due to reduced ejection fraction on cardiac cath will obtain echocardiogram.  Maintain blood pressure diary.  Follow-up after testing.     Please excuse any errors in grammar or translation related to this dictation. Voice recognition software was utilized to prepare this document.     Recent Cardiovascular Testing:  The following results have been reviewed and updated.     Echo 1/30/2023  EF 60-65%  Negative Bubble Study     CTA Coronaries 2/21/23  Significant stenosis of mesfin mid right posterior descending artery with mixed plaque. Mid PDA FFR 0.73  LAD 50-70% luminal stenosis. Mid LAD FFR 0.86  Coronary Artery Calcium Score 228, 67% percentile      [facility] 1/13/25  1. Mild CAD  2. EF 45%    Labs 1/28/25  TC 94, TG 60, HDL 35, LDL 45    VITALS  Vitals:    02/03/25 1502   BP: 116/78   Pulse: 64     Wt Readings from Last 4 Encounters:   02/03/25 96.9 kg (213 lb 11.2 oz)   01/11/25 95.3 kg (210 lb)   10/28/24 100 kg  (220 lb 7.4 oz)   10/25/24 99.3 kg (219 lb)       PHYSICAL EXAM:  GENERAL:  Well developed, well nourished, in no acute distress.  CHEST:  Symmetric and nontender.  NEURO/PSYCH:  Alert and oriented times three with approppriate behavior and responses.  NECK:  Supple, no JVD, no bruit.  LUNGS:  Clear to auscultation bilaterally, normal respiratory effort.  HEART:  Rate and rhythm regular with no evident murmur, no gallop appreciated.                   There are no rubs, clicks or heaves.  EXTREMITIES:  Warm with good color, no clubbing or cyanosis.  There is no edema noted.  PERIPHERAL VASCULAR:  Pulses present and equally palpable; 2+ throughout.    ASSESSMENT AND PLAN  Diagnoses and all orders for this visit:  Coronary artery disease involving native coronary artery of native heart, unspecified whether angina present  -     Transthoracic Echo (TTE) Complete; Future  Benign essential hypertension  Mixed hyperlipidemia  Obstructive sleep apnea  Former smoker  BMI 34.0-34.9,adult      Past Medical History  Past Medical History:   Diagnosis Date    Coronary artery disease     Hyperlipidemia     Hypertension     Prostatitis        Social History  Social History     Tobacco Use    Smoking status: Former     Types: Cigars     Quit date:      Years since quittin.1    Smokeless tobacco: Never   Substance Use Topics    Alcohol use: Not Currently    Drug use: Never       Family History     Family History   Problem Relation Name Age of Onset    Leukemia Father      Colon cancer Father          Allergies:  No Known Allergies     Outpatient Medications:  Current Outpatient Medications   Medication Instructions    amLODIPine (NORVASC) 5 mg, oral, Daily    aspirin 81 mg, oral, Daily    atorvastatin (LIPITOR) 40 mg, oral, Daily    isosorbide mononitrate ER (IMDUR) 30 mg, oral, Every 24 hours    mometasone (Elocon) 0.1 % ointment Topical, Daily    nitroglycerin (NITROSTAT) 0.4 mg, sublingual, Every 5 min PRN, as directed         Recent Lab Results:    CMP:    Lab Results   Component Value Date     01/27/2025    K 4.2 01/27/2025     01/27/2025    CO2 28 01/27/2025    BUN 15 01/27/2025    CREATININE 0.88 01/27/2025    GLUCOSE 94 01/27/2025    CALCIUM 9.2 01/27/2025       Magnesium:    Lab Results   Component Value Date    MG 2.18 01/13/2025       Lipid Profile:    Lab Results   Component Value Date    TRIG 60 01/27/2025    HDL 35 (L) 01/27/2025    LDLCALC 45 01/27/2025       TSH:    Lab Results   Component Value Date    TSH 2.16 10/25/2023       BNP:   Lab Results   Component Value Date    BNP 20 01/11/2025        PT/INR:    Lab Results   Component Value Date    PROTIME 11.8 04/11/2023    INR 1.0 04/11/2023       HgBA1c:    Lab Results   Component Value Date    HGBA1C 5.2 10/25/2023       BMP:  Lab Results   Component Value Date     01/27/2025     01/13/2025     01/12/2025     01/11/2025    K 4.2 01/27/2025    K 3.8 01/13/2025    K 4.0 01/12/2025    K 4.1 01/11/2025     01/27/2025     01/13/2025     01/12/2025     01/11/2025    CO2 28 01/27/2025    CO2 27 01/13/2025    CO2 25 01/12/2025    CO2 26 01/11/2025    BUN 15 01/27/2025    BUN 15 01/13/2025    BUN 17 01/12/2025    BUN 16 01/11/2025    CREATININE 0.88 01/27/2025    CREATININE 0.87 01/13/2025    CREATININE 0.94 01/12/2025    CREATININE 0.86 01/11/2025       CBC:  Lab Results   Component Value Date    WBC 5.7 01/13/2025    WBC 6.4 01/12/2025    WBC 6.6 01/11/2025    RBC 4.57 01/13/2025    RBC 4.54 01/12/2025    RBC 4.80 01/11/2025    HGB 13.7 01/13/2025    HGB 13.7 01/12/2025    HGB 14.4 01/11/2025    HCT 40.8 (L) 01/13/2025    HCT 40.2 (L) 01/12/2025    HCT 42.7 01/11/2025    MCV 89 01/13/2025    MCV 89 01/12/2025    MCV 89 01/11/2025    MCH 30.0 01/13/2025    MCH 30.2 01/12/2025    MCH 30.0 01/11/2025    MCHC 33.6 01/13/2025    MCHC 34.1 01/12/2025    MCHC 33.7 01/11/2025    RDW 12.0 01/13/2025    RDW 12.0 01/12/2025     RDW 11.9 01/11/2025     01/13/2025     01/12/2025     01/11/2025    MPV 9.6 10/25/2023       Cardiac Enzymes:    Lab Results   Component Value Date    TROPHS 5 01/11/2025    TROPHS 5 01/11/2025    TROPHS 5 02/02/2024       Hepatic Function Panel:    Lab Results   Component Value Date    ALKPHOS 74 01/27/2025    ALT 28 01/27/2025    AST 23 01/27/2025    PROT 6.6 01/27/2025    BILITOT 1.6 (H) 01/27/2025           Darrell Durand DO

## 2025-02-03 NOTE — PATIENT INSTRUCTIONS
Continue same medications and treatments.   Patient educated on proper medication use.   Patient educated on risk factor modification.   Please bring any lab results from other providers / physicians to your next appointment.     Please bring all medicines, vitamins, and herbal supplements with you when you come to the office.     Prescriptions will not be filled unless you are compliant with your follow up appointments or have a follow up appointment scheduled as per instruction of your physician. Refills should be requested at the time of your visit.    Check your blood pressure 3 times a week for 2 weeks prior to your next office visit. Bring your blood pressure readings and your blood pressure machine to your next office visit.       ECHO ORDERED    FOLLOW UP AFTER TESTING    IMary Jo LPN, am scribing for and in the presence of Dr. Darrell Durand, DO, FACC

## 2025-02-10 ENCOUNTER — APPOINTMENT (OUTPATIENT)
Dept: CARDIOLOGY | Facility: CLINIC | Age: 70
End: 2025-02-10
Payer: MEDICARE

## 2025-02-10 ENCOUNTER — ANESTHESIA EVENT (OUTPATIENT)
Dept: GASTROENTEROLOGY | Facility: EXTERNAL LOCATION | Age: 70
End: 2025-02-10

## 2025-02-10 ENCOUNTER — OFFICE VISIT (OUTPATIENT)
Dept: GASTROENTEROLOGY | Facility: CLINIC | Age: 70
End: 2025-02-10
Payer: MEDICARE

## 2025-02-10 VITALS
HEIGHT: 67 IN | OXYGEN SATURATION: 98 % | RESPIRATION RATE: 16 BRPM | WEIGHT: 214 LBS | SYSTOLIC BLOOD PRESSURE: 142 MMHG | BODY MASS INDEX: 33.59 KG/M2 | DIASTOLIC BLOOD PRESSURE: 89 MMHG | HEART RATE: 62 BPM

## 2025-02-10 DIAGNOSIS — R07.89 ATYPICAL CHEST PAIN: Primary | ICD-10-CM

## 2025-02-10 DIAGNOSIS — R10.13 EPIGASTRIC ABDOMINAL PAIN: ICD-10-CM

## 2025-02-10 PROCEDURE — 3079F DIAST BP 80-89 MM HG: CPT | Performed by: STUDENT IN AN ORGANIZED HEALTH CARE EDUCATION/TRAINING PROGRAM

## 2025-02-10 PROCEDURE — 3077F SYST BP >= 140 MM HG: CPT | Performed by: STUDENT IN AN ORGANIZED HEALTH CARE EDUCATION/TRAINING PROGRAM

## 2025-02-10 PROCEDURE — 3008F BODY MASS INDEX DOCD: CPT | Performed by: STUDENT IN AN ORGANIZED HEALTH CARE EDUCATION/TRAINING PROGRAM

## 2025-02-10 PROCEDURE — 1111F DSCHRG MED/CURRENT MED MERGE: CPT | Performed by: STUDENT IN AN ORGANIZED HEALTH CARE EDUCATION/TRAINING PROGRAM

## 2025-02-10 PROCEDURE — 1159F MED LIST DOCD IN RCRD: CPT | Performed by: STUDENT IN AN ORGANIZED HEALTH CARE EDUCATION/TRAINING PROGRAM

## 2025-02-10 PROCEDURE — 99204 OFFICE O/P NEW MOD 45 MIN: CPT | Performed by: STUDENT IN AN ORGANIZED HEALTH CARE EDUCATION/TRAINING PROGRAM

## 2025-02-10 PROCEDURE — 1036F TOBACCO NON-USER: CPT | Performed by: STUDENT IN AN ORGANIZED HEALTH CARE EDUCATION/TRAINING PROGRAM

## 2025-02-10 PROCEDURE — 1123F ACP DISCUSS/DSCN MKR DOCD: CPT | Performed by: STUDENT IN AN ORGANIZED HEALTH CARE EDUCATION/TRAINING PROGRAM

## 2025-02-10 RX ORDER — OMEPRAZOLE 40 MG/1
40 CAPSULE, DELAYED RELEASE ORAL DAILY
Qty: 30 CAPSULE | Refills: 2 | Status: SHIPPED | OUTPATIENT
Start: 2025-02-10 | End: 2026-02-10

## 2025-02-10 NOTE — PROGRESS NOTES
CC: Atypical chest pain, epigastric abdominal pain.    History of Present Illness:   Demetrio Malave is a 69 y.o. male with a PMH of CAD, HTN, HLD, obesity, colon polyps, GERD, BPH, skin cancer, BYRON, headaches, former smoker who presents to clinic for atypical chest pain, epigastric abdominal pain. Occasional abdominal pain. Bowels are normal. The epigastric/chest pain is intermittent and can be severe. No dysphagia. Weight is decreasing on purpose. On ASA.    CT A/P without IV contrast 10/2024: Right renal cyst, diverticulosis, cholelithiasis, right lung nodule.  CT A/P without IV contrast 2/2024: 2 mm nonobstructing renal stone, cholelithiasis, diverticulosis.  CT A/P with IV contrast 4/2023: Fecal like material in the small bowel suggestive of delayed intestinal transit, diverticulosis, right renal cyst.  Colonoscopy 3/2021: 2 polyps (TAs), diverticulosis, hemorrhoids.  Has had other colonoscopies with polyps.    Review of Systems  ROS Negative unless otherwise stated above.    Past Medical/Surgical History  Past Medical History:   Diagnosis Date    Coronary artery disease     Hyperlipidemia     Hypertension     Prostatitis       Past Surgical History:   Procedure Laterality Date    CARDIAC CATHETERIZATION N/A 1/13/2025    Procedure: Left Heart Cath, With LV;  Surgeon: Erik Morelos MD;  Location: [facility];  Service: Cardiovascular;  Laterality: N/A;    CT ANGIO CORONARY ART WITH HEARTFLOW IF SCORE >30%  2/22/2023    CT HEART CORONARY ANGIOGRAM [id number] CT    HERNIA REPAIR  02/16/2017    Hernia Repair    OTHER SURGICAL HISTORY  12/17/2018    Shoulder surgery        Social History   reports that he quit smoking about 45 years ago. His smoking use included cigars. He has never used smokeless tobacco. He reports that he does not currently use alcohol. He reports that he does not use drugs.     Family History  family history includes Colon cancer in his father; Leukemia in his father.      Allergies  No Known Allergies    Medications  Current Outpatient Medications   Medication Instructions    amLODIPine (NORVASC) 5 mg, oral, Daily    aspirin 81 mg, oral, Daily    atorvastatin (LIPITOR) 40 mg, oral, Daily    isosorbide mononitrate ER (IMDUR) 30 mg, oral, Every 24 hours    mometasone (Elocon) 0.1 % ointment Topical, Daily    nitroglycerin (NITROSTAT) 0.4 mg, sublingual, Every 5 min PRN, as directed    omeprazole (PRILOSEC) 40 mg, oral, Daily, Do not crush or chew.        Objective   Visit Vitals  /89   Pulse 62   Resp 16        General: A&Ox3, NAD.  HEENT: AT/NC.   CV: RRR. No murmur.  Resp: CTA bilaterally. No wheezing, rhonchi or rales.   GI: Soft, NT/ND.   Extrem: No edema. Pulses intact.  Skin: No Jaundice.   Neuro: No focal deficits.   Psych: Normal mood and affect.     Lab Results   Component Value Date    WBC 5.7 01/13/2025    HGB 13.7 01/13/2025    HCT 40.8 (L) 01/13/2025    MCV 89 01/13/2025     01/13/2025       Chemistry    Lab Results   Component Value Date/Time     01/27/2025 0715    K 4.2 01/27/2025 0715     01/27/2025 0715    CO2 28 01/27/2025 0715    BUN 15 01/27/2025 0715    CREATININE 0.88 01/27/2025 0715    Lab Results   Component Value Date/Time    CALCIUM 9.2 01/27/2025 0715    ALKPHOS 74 01/27/2025 0715    AST 23 01/27/2025 0715    ALT 28 01/27/2025 0715    BILITOT 1.6 (H) 01/27/2025 0715           ASSESSMENT/PLAN  Demetrio Malave is a 69 y.o. male with a PMH of CAD, HTN, HLD, obesity, colon polyps, GERD, BPH, skin cancer, BYRON, headaches, former smoker who presents to clinic for atypical chest pain, epigastric abdominal pain.     -Start Prilosec 40 mg daily.  -Obtain EGD.  -Antireflux lifestyle.   -Surveillance colonoscopy in 2026.      Oswaldo Moore DO

## 2025-02-11 ENCOUNTER — HOSPITAL ENCOUNTER (OUTPATIENT)
Dept: CARDIOLOGY | Facility: CLINIC | Age: 70
Discharge: HOME | End: 2025-02-11
Payer: MEDICARE

## 2025-02-11 DIAGNOSIS — I25.10 CORONARY ARTERY DISEASE INVOLVING NATIVE CORONARY ARTERY OF NATIVE HEART, UNSPECIFIED WHETHER ANGINA PRESENT: ICD-10-CM

## 2025-02-11 LAB
AORTIC VALVE MEAN GRADIENT: 7 MMHG
AORTIC VALVE PEAK VELOCITY: 1.84 M/S
AV PEAK GRADIENT: 14 MMHG
AVA (PEAK VEL): 2.05 CM2
AVA (VTI): 2.32 CM2
EJECTION FRACTION APICAL 4 CHAMBER: 60.3
EJECTION FRACTION: 63 %
LEFT VENTRICLE INTERNAL DIMENSION DIASTOLE: 5.1 CM (ref 3.5–6)
LEFT VENTRICULAR OUTFLOW TRACT DIAMETER: 2.1 CM
LV EJECTION FRACTION BIPLANE: 62 %
MITRAL VALVE E/A RATIO: 0.86
MITRAL VALVE E/E' RATIO: 6.8
RIGHT VENTRICLE PEAK SYSTOLIC PRESSURE: 28.2 MMHG

## 2025-02-11 PROCEDURE — 93306 TTE W/DOPPLER COMPLETE: CPT | Performed by: INTERNAL MEDICINE

## 2025-02-11 PROCEDURE — 93306 TTE W/DOPPLER COMPLETE: CPT

## 2025-02-13 ENCOUNTER — HOSPITAL ENCOUNTER (OUTPATIENT)
Dept: GASTROENTEROLOGY | Facility: EXTERNAL LOCATION | Age: 70
Discharge: HOME | End: 2025-02-13
Payer: MEDICARE

## 2025-02-13 ENCOUNTER — ANESTHESIA (OUTPATIENT)
Dept: GASTROENTEROLOGY | Facility: EXTERNAL LOCATION | Age: 70
End: 2025-02-13

## 2025-02-13 VITALS
SYSTOLIC BLOOD PRESSURE: 101 MMHG | RESPIRATION RATE: 15 BRPM | HEART RATE: 55 BPM | OXYGEN SATURATION: 100 % | TEMPERATURE: 97.9 F | DIASTOLIC BLOOD PRESSURE: 67 MMHG

## 2025-02-13 DIAGNOSIS — R07.89 ATYPICAL CHEST PAIN: ICD-10-CM

## 2025-02-13 DIAGNOSIS — R10.13 EPIGASTRIC PAIN: Primary | ICD-10-CM

## 2025-02-13 PROCEDURE — 43239 EGD BIOPSY SINGLE/MULTIPLE: CPT | Performed by: STUDENT IN AN ORGANIZED HEALTH CARE EDUCATION/TRAINING PROGRAM

## 2025-02-13 RX ORDER — LIDOCAINE HYDROCHLORIDE 20 MG/ML
INJECTION, SOLUTION INFILTRATION; PERINEURAL AS NEEDED
Status: DISCONTINUED | OUTPATIENT
Start: 2025-02-13 | End: 2025-02-13

## 2025-02-13 RX ORDER — PROPOFOL 10 MG/ML
INJECTION, EMULSION INTRAVENOUS AS NEEDED
Status: DISCONTINUED | OUTPATIENT
Start: 2025-02-13 | End: 2025-02-13

## 2025-02-13 RX ADMIN — PROPOFOL 50 MG: 10 INJECTION, EMULSION INTRAVENOUS at 12:23

## 2025-02-13 RX ADMIN — PROPOFOL 100 MG: 10 INJECTION, EMULSION INTRAVENOUS at 12:21

## 2025-02-13 RX ADMIN — PROPOFOL 50 MG: 10 INJECTION, EMULSION INTRAVENOUS at 12:22

## 2025-02-13 RX ADMIN — LIDOCAINE HYDROCHLORIDE 2 ML: 20 INJECTION, SOLUTION INFILTRATION; PERINEURAL at 12:21

## 2025-02-13 RX ADMIN — PROPOFOL 30 MG: 10 INJECTION, EMULSION INTRAVENOUS at 12:24

## 2025-02-13 RX ADMIN — PROPOFOL 20 MG: 10 INJECTION, EMULSION INTRAVENOUS at 12:25

## 2025-02-13 SDOH — HEALTH STABILITY: MENTAL HEALTH: CURRENT SMOKER: 0

## 2025-02-13 ASSESSMENT — PAIN SCALES - GENERAL
PAINLEVEL_OUTOF10: 0 - NO PAIN
PAIN_LEVEL: 0
PAINLEVEL_OUTOF10: 0 - NO PAIN

## 2025-02-13 ASSESSMENT — PAIN - FUNCTIONAL ASSESSMENT
PAIN_FUNCTIONAL_ASSESSMENT: 0-10

## 2025-02-13 NOTE — ANESTHESIA POSTPROCEDURE EVALUATION
Patient: Demetrio Malave    Procedure Summary       Date: 02/13/25 Room / Location: [facility]    Anesthesia Start: 1216 Anesthesia Stop: 1234    Procedure: EGD Diagnosis: Atypical chest pain    Scheduled Providers: Oswaldo Moore DO Responsible Provider: DIGNA Jarquin    Anesthesia Type: MAC ASA Status: 3            Anesthesia Type: MAC    Vitals Value Taken Time   BP 85/52 02/13/25 1230   Temp 36.6 °C (97.9 °F) 02/13/25 1230   Pulse 63 02/13/25 1230   Resp 14 02/13/25 1230   SpO2 97 02/13/25 1234       Anesthesia Post Evaluation    Patient location during evaluation: PACU  Patient participation: waiting for patient participation  Level of consciousness: responsive to verbal stimuli  Pain score: 0  Pain management: adequate  Airway patency: patent  Cardiovascular status: blood pressure returned to baseline  Respiratory status: acceptable  Hydration status: acceptable  Postoperative Nausea and Vomiting: none      No notable events documented.

## 2025-02-13 NOTE — H&P
Outpatient NR Procedure H&P    Patient Profile  Name Demetrio Malave  Date of Birth 1955  MRN [id number]  PCP Rizwan Norris        Diagnosis: Atypical chest pain, epigastric pain.  Procedure(s):  EGD.    Allergies  No Known Allergies    Past Medical History   Past Medical History:   Diagnosis Date    Coronary artery disease     Hyperlipidemia     Hypertension     Prostatitis        Medication Reviewed - yes  Prior to Admission medications    Medication Sig Start Date End Date Taking? Authorizing Provider   amLODIPine (Norvasc) 5 mg tablet Take 1 tablet (5 mg) by mouth once daily. 10/25/24  Yes Rizwan Norris DO   aspirin 81 mg chewable tablet Chew 1 tablet (81 mg) once daily. 10/25/24  Yes Rizwan Norris DO   atorvastatin (Lipitor) 40 mg tablet Take 1 tablet (40 mg) by mouth once daily. 10/25/24  Yes Rizwan Norris DO   isosorbide mononitrate ER (Imdur) 30 mg 24 hr tablet Take 1 tablet (30 mg) by mouth once every 24 hours. 10/25/24  Yes Rizwan Norris DO   omeprazole (PriLOSEC) 40 mg DR capsule Take 1 capsule (40 mg) by mouth once daily. Do not crush or chew. 2/10/25 2/10/26 Yes Oswaldo Moore, DO   mometasone (Elocon) 0.1 % ointment Apply topically once daily. 10/25/24 10/25/25  Rizwan Norris, DO   nitroglycerin (Nitrostat) 0.4 mg SL tablet Place 1 tablet (0.4 mg) under the tongue every 5 minutes if needed for chest pain. as directed  Patient not taking: Reported on 2/13/2025 10/25/24 10/25/25  Rizwan Norris DO       Physical Exam  Vitals:    02/13/25 1219   BP: 137/72   Pulse: 57   Resp: 11   Temp: 36.7 °C (98.1 °F)   SpO2: 97%        General: A&Ox3, NAD.  HEENT: AT/NC.   CV: RRR. No murmur.  Resp: CTA bilaterally. No wheezing, rhonchi or rales.   GI: Soft, NT/ND.  Extrem: No edema. Pulses intact.  Skin: No Jaundice.   Neuro: No focal deficits.   Psych: Normal mood and affect.        Oropharyngeal Classification II (hard and soft palate, upper portion of tonsils and uvula visible)  ASA PS  Classification 3  Sedation Plan: Deep Sedation.  Procedure Plan - pre-procedural (re)assesment completed by physician:  discharge/transfer patient when discharge criteria met    Oswaldo Moore DO  2/13/2025 12:20 PM

## 2025-02-13 NOTE — ANESTHESIA PREPROCEDURE EVALUATION
Patient: Demetrio Malave    Procedure Information       Anesthesia Start Date/Time: 02/13/25 1216    Scheduled providers: Oswaldo Moore DO    Procedure: EGD    Location: [facility]            Relevant Problems   Cardiac   (+) Benign essential hypertension   (+) CAD (coronary artery disease)   (+) Chest pain   (+) Chest pain, unspecified type   (+) Hyperlipidemia      GI   (+) GERD without esophagitis      ID   (+) Infected sebaceous cyst   (+) Other viral warts      Skin   (+) Rash and other nonspecific skin eruption       Clinical information reviewed:   Tobacco  Allergies  Meds   Med Hx  Surg Hx   Fam Hx  Soc Hx        NPO Detail:  NPO/Void Status  Date of Last Liquid: 02/13/25  Time of Last Liquid: 0600  Date of Last Solid: 02/12/25  Time of Last Solid: 2130         Physical Exam    Airway  Mallampati: II  TM distance: >3 FB  Neck ROM: full     Cardiovascular - normal exam     Dental - normal exam     Pulmonary - normal exam     Abdominal   (+) obese         Anesthesia Plan    History of general anesthesia?: yes  History of complications of general anesthesia?: no    ASA 3     MAC     The patient is not a current smoker.  Patient was previously instructed to abstain from smoking on day of procedure.  Patient did not smoke on day of procedure.    intravenous induction   Anesthetic plan and risks discussed with patient.

## 2025-02-13 NOTE — DISCHARGE INSTRUCTIONS

## 2025-02-17 ENCOUNTER — APPOINTMENT (OUTPATIENT)
Dept: CARDIOLOGY | Facility: CLINIC | Age: 70
End: 2025-02-17
Payer: MEDICARE

## 2025-02-18 LAB
LABORATORY COMMENT REPORT: NORMAL
PATH REPORT.FINAL DX SPEC: NORMAL
PATH REPORT.GROSS SPEC: NORMAL
PATH REPORT.TOTAL CANCER: NORMAL

## 2025-02-19 ENCOUNTER — OFFICE VISIT (OUTPATIENT)
Dept: GASTROENTEROLOGY | Facility: CLINIC | Age: 70
End: 2025-02-19
Payer: MEDICARE

## 2025-02-19 DIAGNOSIS — R10.13 EPIGASTRIC PAIN: Primary | ICD-10-CM

## 2025-02-19 DIAGNOSIS — R07.89 ATYPICAL CHEST PAIN: ICD-10-CM

## 2025-02-19 PROCEDURE — 1123F ACP DISCUSS/DSCN MKR DOCD: CPT | Performed by: STUDENT IN AN ORGANIZED HEALTH CARE EDUCATION/TRAINING PROGRAM

## 2025-02-19 PROCEDURE — G2211 COMPLEX E/M VISIT ADD ON: HCPCS | Performed by: STUDENT IN AN ORGANIZED HEALTH CARE EDUCATION/TRAINING PROGRAM

## 2025-02-19 PROCEDURE — 99213 OFFICE O/P EST LOW 20 MIN: CPT | Performed by: STUDENT IN AN ORGANIZED HEALTH CARE EDUCATION/TRAINING PROGRAM

## 2025-02-19 NOTE — PROGRESS NOTES
"CC: Atypical chest pain, epigastric abdominal pain.    History of Present Illness:   Demetrio Malave is a 69 y.o. male with a PMH of CAD, HTN, HLD, obesity, colon polyps, GERD, BPH, skin cancer, BYRON, headaches, former smoker who presents to clinic for atypical chest pain, epigastric abdominal pain. Patient with recent EGD, results below. Patient is on Prilosec 40 mg daily. He believes it may be giving him some lower abdominal pain. The epigastric abdominal pain is improved. Still with occasional chest pain, but it is improving. Low HR seems to exacerbate the chest pain. No other concerns. No NSAIDs. Takes ASA.     EGD 2/13/2025: Gastropathy. No infection.    CT A/P without IV contrast 10/2024: Right renal cyst, diverticulosis, cholelithiasis, right lung nodule.  CT A/P without IV contrast 2/2024: 2 mm nonobstructing renal stone, cholelithiasis, diverticulosis.  CT A/P with IV contrast 4/2023: Fecal like material in the small bowel suggestive of delayed intestinal transit, diverticulosis, right renal cyst.  Colonoscopy 3/2021: 2 polyps (TAs), diverticulosis, hemorrhoids.  Has had other colonoscopies with polyps.    GI visit 2/10/2025: \"Occasional abdominal pain. Bowels are normal. The epigastric/chest pain is intermittent and can be severe. No dysphagia. Weight is decreasing on purpose. On ASA.\"    Review of Systems  ROS Negative unless otherwise stated above.    Past Medical/Surgical History  Past Medical History:   Diagnosis Date    Coronary artery disease     Hyperlipidemia     Hypertension     Prostatitis       Past Surgical History:   Procedure Laterality Date    CARDIAC CATHETERIZATION N/A 1/13/2025    Procedure: Left Heart Cath, With LV;  Surgeon: Erik Morelos MD;  Location: [facility];  Service: Cardiovascular;  Laterality: N/A;    CT ANGIO CORONARY ART WITH HEARTFLOW IF SCORE >30%  2/22/2023    CT HEART CORONARY ANGIOGRAM [id number] CT    HERNIA REPAIR  02/16/2017    Hernia Repair    OTHER " SURGICAL HISTORY  12/17/2018    Shoulder surgery        Social History   reports that he quit smoking about 45 years ago. His smoking use included cigars. He has never used smokeless tobacco. He reports that he does not currently use alcohol. He reports that he does not use drugs.     Family History  family history includes Colon cancer in his father; Leukemia in his father.     Allergies  No Known Allergies    Medications  Current Outpatient Medications   Medication Instructions    amLODIPine (NORVASC) 5 mg, oral, Daily    aspirin 81 mg, oral, Daily    atorvastatin (LIPITOR) 40 mg, oral, Daily    isosorbide mononitrate ER (IMDUR) 30 mg, oral, Every 24 hours    mometasone (Elocon) 0.1 % ointment Topical, Daily    nitroglycerin (NITROSTAT) 0.4 mg, sublingual, Every 5 min PRN, as directed    omeprazole (PRILOSEC) 40 mg, oral, Daily, Do not crush or chew.        Objective   General: A&Ox3, NAD.  HEENT: AT/NC.   Skin: No Jaundice.   Neuro: No focal deficits.   Psych: Normal mood and affect.     Lab Results   Component Value Date    WBC 5.7 01/13/2025    HGB 13.7 01/13/2025    HCT 40.8 (L) 01/13/2025    MCV 89 01/13/2025     01/13/2025       Chemistry    Lab Results   Component Value Date/Time     01/27/2025 0715    K 4.2 01/27/2025 0715     01/27/2025 0715    CO2 28 01/27/2025 0715    BUN 15 01/27/2025 0715    CREATININE 0.88 01/27/2025 0715    Lab Results   Component Value Date/Time    CALCIUM 9.2 01/27/2025 0715    ALKPHOS 74 01/27/2025 0715    AST 23 01/27/2025 0715    ALT 28 01/27/2025 0715    BILITOT 1.6 (H) 01/27/2025 0715           ASSESSMENT/PLAN  Demetrio Malave is a 69 y.o. male with a PMH of CAD, HTN, HLD, obesity, colon polyps, GERD, BPH, skin cancer, BYRON, headaches, former smoker who presents to clinic for atypical chest pain, epigastric abdominal pain. Improving.     EGD 2/13/2025: Gastropathy. No infection. CT imaging unremarkable.    -Continue Prilosec 40 mg daily.  -Antireflux  lifestyle.   -Surveillance colonoscopy in 2026.    Total video visit time: 7 minutes.  Follow up in 6 months.    Oswaldo Moore DO

## 2025-02-24 ENCOUNTER — APPOINTMENT (OUTPATIENT)
Dept: CARDIOLOGY | Facility: CLINIC | Age: 70
End: 2025-02-24
Payer: MEDICARE

## 2025-02-24 VITALS
DIASTOLIC BLOOD PRESSURE: 80 MMHG | HEART RATE: 56 BPM | BODY MASS INDEX: 33.52 KG/M2 | HEIGHT: 66 IN | SYSTOLIC BLOOD PRESSURE: 124 MMHG | WEIGHT: 208.6 LBS

## 2025-02-24 DIAGNOSIS — I10 BENIGN ESSENTIAL HYPERTENSION: ICD-10-CM

## 2025-02-24 DIAGNOSIS — E78.2 MIXED HYPERLIPIDEMIA: ICD-10-CM

## 2025-02-24 DIAGNOSIS — G47.33 OBSTRUCTIVE SLEEP APNEA: ICD-10-CM

## 2025-02-24 DIAGNOSIS — I25.10 CORONARY ARTERY DISEASE INVOLVING NATIVE CORONARY ARTERY OF NATIVE HEART WITHOUT ANGINA PECTORIS: ICD-10-CM

## 2025-02-24 DIAGNOSIS — Z87.891 FORMER SMOKER: ICD-10-CM

## 2025-02-24 PROCEDURE — 3079F DIAST BP 80-89 MM HG: CPT | Performed by: INTERNAL MEDICINE

## 2025-02-24 PROCEDURE — 1159F MED LIST DOCD IN RCRD: CPT | Performed by: INTERNAL MEDICINE

## 2025-02-24 PROCEDURE — 1036F TOBACCO NON-USER: CPT | Performed by: INTERNAL MEDICINE

## 2025-02-24 PROCEDURE — 3008F BODY MASS INDEX DOCD: CPT | Performed by: INTERNAL MEDICINE

## 2025-02-24 PROCEDURE — 99214 OFFICE O/P EST MOD 30 MIN: CPT | Performed by: INTERNAL MEDICINE

## 2025-02-24 PROCEDURE — 3074F SYST BP LT 130 MM HG: CPT | Performed by: INTERNAL MEDICINE

## 2025-02-24 PROCEDURE — 1123F ACP DISCUSS/DSCN MKR DOCD: CPT | Performed by: INTERNAL MEDICINE

## 2025-02-24 NOTE — PROGRESS NOTES
` CARDIOLOGY OFFICE VISIT      CHIEF COMPLAINT  Chief Complaint   Patient presents with    Follow-up     1 month follow-up to discuss testing results        HISTORY OF PRESENT ILLNESS  Patient is 69-year-old  male with past medical history significant for coronary artery disease, hypertension, hyperlipidemia, obstructive sleep apnea, BPH who presents for follow-up cardiovascular care.      Denies chest pain, abdominal pain, dyspnea, palpitations, nausea, vomiting.  He has occasional dizziness getting up without near syncope or manohar syncope.  Denies edema.  Patient saw gastroenterology and had EGD.  Patient was placed on PPI.  Patient exercise on a treadmill 5 times a week for 1.5 hours.  Thus far he has been intolerant of CPAP.     Past medical history:  As above     Past surgical history:  Right upper chest vascular trauma with repair 1975  Hernia repair x3     Social history:  Patient remotely smokes cigars quit 40 years ago  Rare alcohol use     Family history:  Father history of myocardial infarction  at age 72 from complications of diabetes and leukemia  Mother  in her late 80s     Review of systems have been reviewed and are negative, noncontributory, or as previous mentioned x12 systems.     I personally reviewed EKG 2023: Normal sinus rhythm     Assessment:    Coronary artery disease mild on cardiac cath 2025  Hypertension  Hyperlipidemia  Obstructive sleep apnea  BPH  Gastropathy-  Cholelithiasis  Diverticulosis  Nephrolithiasis     Recommendations:  Patient appears to be stable from a cardiac standpoint. Symptoms have improved with medical therapy. No severe obstructive coronary artery disease on recent cardiac cath.  Patient had some irritation in his stomach on EGD.  At this time we will hold aspirin for 1 month while he is treated with a PPI.  When he resumes aspirin advised specifically enteric-coated aspirin 81 mg, taken in the morning with  food.  Continue medical therapy as prescribed.  No evidence of heart failure.  Normal left ventricular systolic function on echocardiogram.  No symptomatic arrhythmia.   Advise diet, weight loss, exercise program 30 minutes/day. Increase water intake to 64 ounces per day.  Refer to sleep medicine for alternate treatments of sleep apnea.  Follow-up with myself in 1 year.     Please excuse any errors in grammar or translation related to this dictation. Voice recognition software was utilized to prepare this document.     Recent Cardiovascular Testing:  The following results have been reviewed and updated.     Echo 2/11/25  EF 60-65%  Mildly enlarge right ventricle.     CTA Coronaries 2/21/23  Significant stenosis of mesfin mid right posterior descending artery with mixed plaque. Mid PDA FFR 0.73  LAD 50-70% luminal stenosis. Mid LAD FFR 0.86  Coronary Artery Calcium Score 228, 67% percentile      [facility] 1/13/25  1. Mild CAD  2. EF 45%     Labs 1/28/25  TC 94, TG 60, HDL 35, LDL 45    VITALS  Vitals:    02/24/25 1530   BP: 124/80   Pulse: 56     Wt Readings from Last 4 Encounters:   02/24/25 94.6 kg (208 lb 9.6 oz)   02/10/25 97.1 kg (214 lb)   02/03/25 96.9 kg (213 lb 11.2 oz)   01/11/25 95.3 kg (210 lb)       PHYSICAL EXAM:  GENERAL:  Well developed, well nourished, in no acute distress.  CHEST:  Symmetric and nontender.  NEURO/PSYCH:  Alert and oriented times three with approppriate behavior and responses.  NECK:  Supple, no JVD, no bruit.  LUNGS:  Clear to auscultation bilaterally, normal respiratory effort.  HEART:  Rate and rhythm regular with no evident murmur, no gallop appreciated.                   There are no rubs, clicks or heaves.  EXTREMITIES:  Warm with good color, no clubbing or cyanosis.  There is no edema noted.  PERIPHERAL VASCULAR:  Pulses present and equally palpable; 2+ throughout.    ASSESSMENT AND PLAN  Problem List Items Addressed This Visit          Cardiac and Vasculature    Benign essential  hypertension    CAD (coronary artery disease)    Relevant Orders    Comprehensive metabolic panel    Hyperlipidemia    Relevant Orders    Lipid panel       Endocrine/Metabolic    BMI 34.0-34.9,adult       Sleep    Obstructive sleep apnea    Relevant Orders    Referral to Pulmonology       Tobacco    Former smoker       Past Medical History  Past Medical History:   Diagnosis Date    Colon polyp     Coronary artery disease     Diverticulosis     Hyperlipidemia     Hypertension     Prostatitis        Social History  Social History     Tobacco Use    Smoking status: Former     Types: Cigars     Quit date:      Years since quittin.1    Smokeless tobacco: Never   Substance Use Topics    Alcohol use: Not Currently    Drug use: Never       Family History     Family History   Problem Relation Name Age of Onset    Leukemia Father Onesimo Malave     Colon cancer Father Onesimo Malave     Colon polyps Father Onesimo Malave     Colon polyps Brother Renny Malave         Allergies:  No Known Allergies     Outpatient Medications:  Current Outpatient Medications   Medication Instructions    amLODIPine (NORVASC) 5 mg, oral, Daily    aspirin 81 mg, oral, Daily    atorvastatin (LIPITOR) 40 mg, oral, Daily    isosorbide mononitrate ER (IMDUR) 30 mg, oral, Every 24 hours    mometasone (Elocon) 0.1 % ointment Topical, Daily    nitroglycerin (NITROSTAT) 0.4 mg, sublingual, Every 5 min PRN, as directed    omeprazole (PRILOSEC) 40 mg, oral, Daily, Do not crush or chew.        Recent Lab Results:    CMP:    Lab Results   Component Value Date     2025    K 4.2 2025     2025    CO2 28 2025    BUN 15 2025    CREATININE 0.88 2025    GLUCOSE 94 2025    CALCIUM 9.2 2025       Magnesium:    Lab Results   Component Value Date    MG 2.18 2025       Lipid Profile:    Lab Results   Component Value Date    TRIG 60 2025    HDL 35 (L) 2025    LDLCALC 45 2025       TSH:     Lab Results   Component Value Date    TSH 2.16 10/25/2023       BNP:   Lab Results   Component Value Date    BNP 20 01/11/2025        PT/INR:    Lab Results   Component Value Date    PROTIME 11.8 04/11/2023    INR 1.0 04/11/2023       HgBA1c:    Lab Results   Component Value Date    HGBA1C 5.2 10/25/2023       BMP:  Lab Results   Component Value Date     01/27/2025     01/13/2025     01/12/2025     01/11/2025    K 4.2 01/27/2025    K 3.8 01/13/2025    K 4.0 01/12/2025    K 4.1 01/11/2025     01/27/2025     01/13/2025     01/12/2025     01/11/2025    CO2 28 01/27/2025    CO2 27 01/13/2025    CO2 25 01/12/2025    CO2 26 01/11/2025    BUN 15 01/27/2025    BUN 15 01/13/2025    BUN 17 01/12/2025    BUN 16 01/11/2025    CREATININE 0.88 01/27/2025    CREATININE 0.87 01/13/2025    CREATININE 0.94 01/12/2025    CREATININE 0.86 01/11/2025       CBC:  Lab Results   Component Value Date    WBC 5.7 01/13/2025    WBC 6.4 01/12/2025    WBC 6.6 01/11/2025    RBC 4.57 01/13/2025    RBC 4.54 01/12/2025    RBC 4.80 01/11/2025    HGB 13.7 01/13/2025    HGB 13.7 01/12/2025    HGB 14.4 01/11/2025    HCT 40.8 (L) 01/13/2025    HCT 40.2 (L) 01/12/2025    HCT 42.7 01/11/2025    MCV 89 01/13/2025    MCV 89 01/12/2025    MCV 89 01/11/2025    MCH 30.0 01/13/2025    MCH 30.2 01/12/2025    MCH 30.0 01/11/2025    MCHC 33.6 01/13/2025    MCHC 34.1 01/12/2025    MCHC 33.7 01/11/2025    RDW 12.0 01/13/2025    RDW 12.0 01/12/2025    RDW 11.9 01/11/2025     01/13/2025     01/12/2025     01/11/2025    MPV 9.6 10/25/2023       Cardiac Enzymes:    Lab Results   Component Value Date    TROPHS 5 01/11/2025    TROPHS 5 01/11/2025    TROPHS 5 02/02/2024       Hepatic Function Panel:    Lab Results   Component Value Date    ALKPHOS 74 01/27/2025    ALT 28 01/27/2025    AST 23 01/27/2025    PROT 6.6 01/27/2025    BILITOT 1.6 (H) 01/27/2025           Darrell Durand DO

## 2025-02-24 NOTE — PATIENT INSTRUCTIONS
Refer to Dr Dustin [facility]   Hold aspirin for one month. USE EC aspirin.  Patient educated on proper medication use.   Patient educated on risk factor modification.   Please bring any lab results from other providers / physicians to your next appointment.     Please bring all medicines, vitamins, and herbal supplements with you when you come to the office.     Prescriptions will not be filled unless you are compliant with your follow up appointments or have a follow up appointment scheduled as per instruction of your physician. Refills should be requested at the time of your visit.  April 2026  BP instructions  CMP and Lipid

## 2025-02-26 ENCOUNTER — PATIENT OUTREACH (OUTPATIENT)
Dept: PRIMARY CARE | Facility: CLINIC | Age: 70
End: 2025-02-26
Payer: MEDICARE

## 2025-03-04 ENCOUNTER — APPOINTMENT (OUTPATIENT)
Dept: GASTROENTEROLOGY | Facility: CLINIC | Age: 70
End: 2025-03-04
Payer: MEDICARE

## 2025-03-05 ENCOUNTER — APPOINTMENT (OUTPATIENT)
Dept: CARDIOLOGY | Facility: CLINIC | Age: 70
End: 2025-03-05
Payer: MEDICARE

## 2025-03-07 ENCOUNTER — OFFICE VISIT (OUTPATIENT)
Dept: PRIMARY CARE | Facility: CLINIC | Age: 70
End: 2025-03-07
Payer: MEDICARE

## 2025-03-07 VITALS
SYSTOLIC BLOOD PRESSURE: 119 MMHG | OXYGEN SATURATION: 98 % | WEIGHT: 208 LBS | BODY MASS INDEX: 34.09 KG/M2 | HEART RATE: 64 BPM | DIASTOLIC BLOOD PRESSURE: 81 MMHG | TEMPERATURE: 96.9 F

## 2025-03-07 DIAGNOSIS — G44.011 INTRACTABLE EPISODIC CLUSTER HEADACHE: Primary | ICD-10-CM

## 2025-03-07 PROCEDURE — 99213 OFFICE O/P EST LOW 20 MIN: CPT | Performed by: FAMILY MEDICINE

## 2025-03-07 RX ORDER — BUTALBITAL, ACETAMINOPHEN AND CAFFEINE 50; 325; 40 MG/1; MG/1; MG/1
1 CAPSULE ORAL 3 TIMES DAILY
Qty: 30 CAPSULE | Refills: 0 | Status: SHIPPED | OUTPATIENT
Start: 2025-03-07 | End: 2025-03-17

## 2025-03-07 ASSESSMENT — ENCOUNTER SYMPTOMS
CHILLS: 0
ABDOMINAL PAIN: 0
SORE THROAT: 0
PALPITATIONS: 0
SHORTNESS OF BREATH: 0
FEVER: 0
HEADACHES: 1
TROUBLE SWALLOWING: 0

## 2025-03-07 NOTE — PROGRESS NOTES
Subjective   Patient ID: Dmeetrio Malave is a 69 y.o. male who presents for Headache (He states the pain is sharp only in one area at the top left side of his head. Pain has been going on for 6 days, also took tylenol but hasn't relieved the pain. Pain comes and goes, when laying on left side it hurts more. Scalp tender to touch, with the on and off pain.).    HPI     Review of Systems   Constitutional:  Negative for chills and fever.   HENT:  Negative for sore throat and trouble swallowing.    Respiratory:  Negative for shortness of breath.    Cardiovascular:  Negative for chest pain, palpitations and leg swelling.   Gastrointestinal:  Negative for abdominal pain.   Skin:  Negative for rash.   Neurological:  Positive for headaches.       Objective   /81   Pulse 64   Temp 36.1 °C (96.9 °F)   Wt 94.3 kg (208 lb)   SpO2 98%   BMI 34.09 kg/m²     Physical Exam  Constitutional:       Appearance: Normal appearance.   HENT:      Head: Normocephalic.   Eyes:      Conjunctiva/sclera: Conjunctivae normal.   Cardiovascular:      Rate and Rhythm: Normal rate and regular rhythm.      Heart sounds: Normal heart sounds.   Pulmonary:      Effort: Pulmonary effort is normal.      Breath sounds: Normal breath sounds.   Musculoskeletal:      Cervical back: Neck supple.   Skin:     General: Skin is warm and dry.   Neurological:      General: No focal deficit present.      Mental Status: He is alert and oriented to person, place, and time.      Cranial Nerves: No cranial nerve deficit.      Sensory: No sensory deficit.      Motor: No weakness.      Coordination: Coordination normal.      Gait: Gait normal.      Deep Tendon Reflexes: Reflexes normal.       Assessment/Plan   Problem List Items Addressed This Visit             ICD-10-CM    Cluster headaches - Primary G44.009    Relevant Medications    butalbital-acetaminophen-caff (Esgic) -40 mg capsule

## 2025-03-10 ENCOUNTER — PATIENT MESSAGE (OUTPATIENT)
Dept: PRIMARY CARE | Facility: CLINIC | Age: 70
End: 2025-03-10
Payer: MEDICARE

## 2025-03-10 ENCOUNTER — TELEPHONE (OUTPATIENT)
Dept: PRIMARY CARE | Facility: CLINIC | Age: 70
End: 2025-03-10
Payer: MEDICARE

## 2025-03-10 DIAGNOSIS — G44.011 INTRACTABLE EPISODIC CLUSTER HEADACHE: Primary | ICD-10-CM

## 2025-03-10 NOTE — TELEPHONE ENCOUNTER
Occupational Therapy    Orders received, chart review completed. Note patient POD #0. Patient initially planned as Fast Track with plans for dc today however plans to stay overnight secondary to drowsiness and nausea. OT will follow up tomorrow for evaluation. Recommend OOB to chair three times a day for meals and self-completion of ADLs as able and medically stable.      Thank you,    Caryle Hill, OT Sent message in [employer]

## 2025-03-10 NOTE — TELEPHONE ENCOUNTER
----- Message from Rizwan Norris sent at 3/10/2025 11:45 AM EDT -----  See message  ----- Message -----  From: Rizwan Norris DO  Sent: 3/10/2025  12:00 AM EDT  To: Rizwan Norris, DO    How is butabial helping the cluster HA?

## 2025-03-11 ENCOUNTER — CLINICAL SUPPORT (OUTPATIENT)
Dept: PRIMARY CARE | Facility: CLINIC | Age: 70
End: 2025-03-11
Payer: MEDICARE

## 2025-03-11 ENCOUNTER — TELEPHONE (OUTPATIENT)
Dept: PRIMARY CARE | Facility: CLINIC | Age: 70
End: 2025-03-11

## 2025-03-11 DIAGNOSIS — G44.011 INTRACTABLE EPISODIC CLUSTER HEADACHE: ICD-10-CM

## 2025-03-11 PROCEDURE — 96372 THER/PROPH/DIAG INJ SC/IM: CPT | Performed by: FAMILY MEDICINE

## 2025-03-11 RX ORDER — KETOROLAC TROMETHAMINE 30 MG/ML
60 INJECTION, SOLUTION INTRAMUSCULAR; INTRAVENOUS ONCE
Status: COMPLETED | OUTPATIENT
Start: 2025-03-11 | End: 2025-03-11

## 2025-03-11 RX ORDER — KETOROLAC TROMETHAMINE 10 MG/1
10 TABLET, FILM COATED ORAL 2 TIMES DAILY
Qty: 6 TABLET | Refills: 0 | Status: SHIPPED | OUTPATIENT
Start: 2025-03-11 | End: 2025-03-14

## 2025-03-11 RX ADMIN — KETOROLAC TROMETHAMINE 60 MG: 30 INJECTION, SOLUTION INTRAMUSCULAR; INTRAVENOUS at 11:52

## 2025-04-01 ENCOUNTER — HOSPITAL ENCOUNTER (EMERGENCY)
Facility: HOSPITAL | Age: 70
Discharge: HOME | End: 2025-04-02
Payer: MEDICARE

## 2025-04-01 ENCOUNTER — APPOINTMENT (OUTPATIENT)
Dept: RADIOLOGY | Facility: HOSPITAL | Age: 70
End: 2025-04-01
Payer: MEDICARE

## 2025-04-01 ENCOUNTER — PATIENT OUTREACH (OUTPATIENT)
Dept: PRIMARY CARE | Facility: CLINIC | Age: 70
End: 2025-04-01
Payer: MEDICARE

## 2025-04-01 ENCOUNTER — APPOINTMENT (OUTPATIENT)
Dept: CARDIOLOGY | Facility: HOSPITAL | Age: 70
End: 2025-04-01
Payer: MEDICARE

## 2025-04-01 DIAGNOSIS — K80.50 BILIARY COLIC: Primary | ICD-10-CM

## 2025-04-01 DIAGNOSIS — K80.20 CALCULUS OF GALLBLADDER WITHOUT CHOLECYSTITIS WITHOUT OBSTRUCTION: ICD-10-CM

## 2025-04-01 LAB
BASOPHILS # BLD AUTO: 0.03 X10*3/UL (ref 0–0.1)
BASOPHILS NFR BLD AUTO: 0.4 %
EOSINOPHIL # BLD AUTO: 0.22 X10*3/UL (ref 0–0.7)
EOSINOPHIL NFR BLD AUTO: 3.2 %
ERYTHROCYTE [DISTWIDTH] IN BLOOD BY AUTOMATED COUNT: 11.9 % (ref 11.5–14.5)
HCT VFR BLD AUTO: 40.4 % (ref 41–52)
HGB BLD-MCNC: 13.6 G/DL (ref 13.5–17.5)
IMM GRANULOCYTES # BLD AUTO: 0.01 X10*3/UL (ref 0–0.7)
IMM GRANULOCYTES NFR BLD AUTO: 0.1 % (ref 0–0.9)
LYMPHOCYTES # BLD AUTO: 1.42 X10*3/UL (ref 1.2–4.8)
LYMPHOCYTES NFR BLD AUTO: 20.7 %
MCH RBC QN AUTO: 29.8 PG (ref 26–34)
MCHC RBC AUTO-ENTMCNC: 33.7 G/DL (ref 32–36)
MCV RBC AUTO: 89 FL (ref 80–100)
MONOCYTES # BLD AUTO: 0.57 X10*3/UL (ref 0.1–1)
MONOCYTES NFR BLD AUTO: 8.3 %
NEUTROPHILS # BLD AUTO: 4.6 X10*3/UL (ref 1.2–7.7)
NEUTROPHILS NFR BLD AUTO: 67.3 %
NRBC BLD-RTO: 0 /100 WBCS (ref 0–0)
PLATELET # BLD AUTO: 179 X10*3/UL (ref 150–450)
RBC # BLD AUTO: 4.56 X10*6/UL (ref 4.5–5.9)
WBC # BLD AUTO: 6.9 X10*3/UL (ref 4.4–11.3)

## 2025-04-01 PROCEDURE — 83880 ASSAY OF NATRIURETIC PEPTIDE: CPT | Performed by: PHYSICIAN ASSISTANT

## 2025-04-01 PROCEDURE — 84484 ASSAY OF TROPONIN QUANT: CPT | Performed by: PHYSICIAN ASSISTANT

## 2025-04-01 PROCEDURE — 93005 ELECTROCARDIOGRAM TRACING: CPT

## 2025-04-01 PROCEDURE — 36415 COLL VENOUS BLD VENIPUNCTURE: CPT | Performed by: PHYSICIAN ASSISTANT

## 2025-04-01 PROCEDURE — 85025 COMPLETE CBC W/AUTO DIFF WBC: CPT | Performed by: PHYSICIAN ASSISTANT

## 2025-04-01 PROCEDURE — 99285 EMERGENCY DEPT VISIT HI MDM: CPT

## 2025-04-01 PROCEDURE — 71045 X-RAY EXAM CHEST 1 VIEW: CPT

## 2025-04-01 PROCEDURE — 83605 ASSAY OF LACTIC ACID: CPT | Performed by: PHYSICIAN ASSISTANT

## 2025-04-01 PROCEDURE — 71045 X-RAY EXAM CHEST 1 VIEW: CPT | Performed by: RADIOLOGY

## 2025-04-01 PROCEDURE — 80053 COMPREHEN METABOLIC PANEL: CPT | Performed by: PHYSICIAN ASSISTANT

## 2025-04-01 PROCEDURE — 83690 ASSAY OF LIPASE: CPT | Performed by: PHYSICIAN ASSISTANT

## 2025-04-01 RX ORDER — ONDANSETRON HYDROCHLORIDE 2 MG/ML
4 INJECTION, SOLUTION INTRAVENOUS ONCE
Status: COMPLETED | OUTPATIENT
Start: 2025-04-01 | End: 2025-04-02

## 2025-04-01 ASSESSMENT — PAIN DESCRIPTION - FREQUENCY: FREQUENCY: CONSTANT/CONTINUOUS

## 2025-04-01 ASSESSMENT — PAIN DESCRIPTION - PROGRESSION: CLINICAL_PROGRESSION: GRADUALLY IMPROVING

## 2025-04-01 ASSESSMENT — LIFESTYLE VARIABLES
HAVE PEOPLE ANNOYED YOU BY CRITICIZING YOUR DRINKING: NO
EVER HAD A DRINK FIRST THING IN THE MORNING TO STEADY YOUR NERVES TO GET RID OF A HANGOVER: NO
TOTAL SCORE: 0
EVER FELT BAD OR GUILTY ABOUT YOUR DRINKING: NO
HAVE YOU EVER FELT YOU SHOULD CUT DOWN ON YOUR DRINKING: NO

## 2025-04-01 ASSESSMENT — PAIN SCALES - GENERAL: PAINLEVEL_OUTOF10: 6

## 2025-04-01 ASSESSMENT — PAIN DESCRIPTION - ORIENTATION: ORIENTATION: RIGHT;LOWER

## 2025-04-01 ASSESSMENT — PAIN DESCRIPTION - ONSET: ONSET: SUDDEN

## 2025-04-01 ASSESSMENT — PAIN DESCRIPTION - DESCRIPTORS: DESCRIPTORS: PRESSURE

## 2025-04-01 ASSESSMENT — PAIN DESCRIPTION - LOCATION: LOCATION: ABDOMEN

## 2025-04-01 ASSESSMENT — PAIN - FUNCTIONAL ASSESSMENT: PAIN_FUNCTIONAL_ASSESSMENT: 0-10

## 2025-04-01 ASSESSMENT — PAIN DESCRIPTION - PAIN TYPE: TYPE: ACUTE PAIN

## 2025-04-02 ENCOUNTER — APPOINTMENT (OUTPATIENT)
Dept: RADIOLOGY | Facility: HOSPITAL | Age: 70
End: 2025-04-02
Payer: MEDICARE

## 2025-04-02 VITALS
BODY MASS INDEX: 32.62 KG/M2 | WEIGHT: 203 LBS | HEART RATE: 70 BPM | SYSTOLIC BLOOD PRESSURE: 170 MMHG | RESPIRATION RATE: 18 BRPM | TEMPERATURE: 97.9 F | OXYGEN SATURATION: 97 % | DIASTOLIC BLOOD PRESSURE: 84 MMHG | HEIGHT: 66 IN

## 2025-04-02 LAB
ALBUMIN SERPL BCP-MCNC: 4.5 G/DL (ref 3.4–5)
ALP SERPL-CCNC: 73 U/L (ref 33–136)
ALT SERPL W P-5'-P-CCNC: 24 U/L (ref 10–52)
ANION GAP SERPL CALC-SCNC: 10 MMOL/L (ref 10–20)
APPEARANCE UR: CLEAR
AST SERPL W P-5'-P-CCNC: 19 U/L (ref 9–39)
BILIRUB SERPL-MCNC: 0.8 MG/DL (ref 0–1.2)
BILIRUB UR STRIP.AUTO-MCNC: NEGATIVE MG/DL
BNP SERPL-MCNC: 24 PG/ML (ref 0–99)
BUN SERPL-MCNC: 14 MG/DL (ref 6–23)
CALCIUM SERPL-MCNC: 9 MG/DL (ref 8.6–10.3)
CARDIAC TROPONIN I PNL SERPL HS: 6 NG/L (ref 0–20)
CARDIAC TROPONIN I PNL SERPL HS: 7 NG/L (ref 0–20)
CHLORIDE SERPL-SCNC: 105 MMOL/L (ref 98–107)
CO2 SERPL-SCNC: 28 MMOL/L (ref 21–32)
COLOR UR: NORMAL
CREAT SERPL-MCNC: 0.85 MG/DL (ref 0.5–1.3)
EGFRCR SERPLBLD CKD-EPI 2021: >90 ML/MIN/1.73M*2
FLUAV RNA RESP QL NAA+PROBE: NOT DETECTED
FLUBV RNA RESP QL NAA+PROBE: NOT DETECTED
GLUCOSE SERPL-MCNC: 104 MG/DL (ref 74–99)
GLUCOSE UR STRIP.AUTO-MCNC: NORMAL MG/DL
HOLD SPECIMEN: NORMAL
KETONES UR STRIP.AUTO-MCNC: NEGATIVE MG/DL
LACTATE SERPL-SCNC: 0.8 MMOL/L (ref 0.4–2)
LEUKOCYTE ESTERASE UR QL STRIP.AUTO: NEGATIVE
LIPASE SERPL-CCNC: 24 U/L (ref 9–82)
NITRITE UR QL STRIP.AUTO: NEGATIVE
PH UR STRIP.AUTO: 6 [PH]
POTASSIUM SERPL-SCNC: 3.6 MMOL/L (ref 3.5–5.3)
PROT SERPL-MCNC: 6.8 G/DL (ref 6.4–8.2)
PROT UR STRIP.AUTO-MCNC: NEGATIVE MG/DL
RBC # UR STRIP.AUTO: NEGATIVE MG/DL
SARS-COV-2 RNA RESP QL NAA+PROBE: NOT DETECTED
SODIUM SERPL-SCNC: 139 MMOL/L (ref 136–145)
SP GR UR STRIP.AUTO: 1.01
UROBILINOGEN UR STRIP.AUTO-MCNC: NORMAL MG/DL

## 2025-04-02 PROCEDURE — 96375 TX/PRO/DX INJ NEW DRUG ADDON: CPT

## 2025-04-02 PROCEDURE — 96361 HYDRATE IV INFUSION ADD-ON: CPT

## 2025-04-02 PROCEDURE — 2500000004 HC RX 250 GENERAL PHARMACY W/ HCPCS (ALT 636 FOR OP/ED): Performed by: PHYSICIAN ASSISTANT

## 2025-04-02 PROCEDURE — 84484 ASSAY OF TROPONIN QUANT: CPT | Performed by: PHYSICIAN ASSISTANT

## 2025-04-02 PROCEDURE — 76705 ECHO EXAM OF ABDOMEN: CPT

## 2025-04-02 PROCEDURE — 76705 ECHO EXAM OF ABDOMEN: CPT | Performed by: RADIOLOGY

## 2025-04-02 PROCEDURE — 74177 CT ABD & PELVIS W/CONTRAST: CPT

## 2025-04-02 PROCEDURE — 87636 SARSCOV2 & INF A&B AMP PRB: CPT | Performed by: PHYSICIAN ASSISTANT

## 2025-04-02 PROCEDURE — 81003 URINALYSIS AUTO W/O SCOPE: CPT | Performed by: PHYSICIAN ASSISTANT

## 2025-04-02 PROCEDURE — 96374 THER/PROPH/DIAG INJ IV PUSH: CPT

## 2025-04-02 PROCEDURE — 2550000001 HC RX 255 CONTRASTS: Performed by: PHYSICIAN ASSISTANT

## 2025-04-02 RX ORDER — DICYCLOMINE HYDROCHLORIDE 20 MG/1
20 TABLET ORAL 2 TIMES DAILY PRN
Qty: 20 TABLET | Refills: 0 | Status: SHIPPED | OUTPATIENT
Start: 2025-04-02 | End: 2025-04-12

## 2025-04-02 RX ORDER — KETOROLAC TROMETHAMINE 30 MG/ML
15 INJECTION, SOLUTION INTRAMUSCULAR; INTRAVENOUS ONCE
Status: COMPLETED | OUTPATIENT
Start: 2025-04-02 | End: 2025-04-02

## 2025-04-02 RX ORDER — ONDANSETRON 4 MG/1
4 TABLET, ORALLY DISINTEGRATING ORAL EVERY 8 HOURS PRN
Qty: 15 TABLET | Refills: 0 | Status: SHIPPED | OUTPATIENT
Start: 2025-04-02 | End: 2025-04-07

## 2025-04-02 RX ORDER — MORPHINE SULFATE 4 MG/ML
4 INJECTION, SOLUTION INTRAMUSCULAR; INTRAVENOUS ONCE
Status: DISCONTINUED | OUTPATIENT
Start: 2025-04-02 | End: 2025-04-02 | Stop reason: HOSPADM

## 2025-04-02 RX ADMIN — IOHEXOL 75 ML: 350 INJECTION, SOLUTION INTRAVENOUS at 00:37

## 2025-04-02 RX ADMIN — ONDANSETRON 4 MG: 2 INJECTION INTRAMUSCULAR; INTRAVENOUS at 00:00

## 2025-04-02 RX ADMIN — SODIUM CHLORIDE 500 ML: 0.9 INJECTION, SOLUTION INTRAVENOUS at 00:00

## 2025-04-02 RX ADMIN — KETOROLAC TROMETHAMINE 15 MG: 30 INJECTION, SOLUTION INTRAMUSCULAR at 02:12

## 2025-04-02 ASSESSMENT — PAIN SCALES - GENERAL: PAINLEVEL_OUTOF10: 2

## 2025-04-02 NOTE — ED PROVIDER NOTES
HPI   Chief Complaint   Patient presents with    Abdominal Pain     RLQ abdominal pain, hx of kidney stones and gallstones         A 69-year-old male patient comes in the emergency department today with complaints of right upper quadrant abdominal pain that started an hour ago.  States it is a persistent pain and rates it a 6 out of 10 on the pain scale currently states he got up to a 10 out of 10 on the pain scale.  States he feels nauseated with it but otherwise has no other complaints present time.  For this purpose he comes in the emergency department today for the evaluation.              Patient History   Past Medical History:   Diagnosis Date    Colon polyp     Coronary artery disease     Diverticulosis     Hyperlipidemia     Hypertension     Prostatitis      Past Surgical History:   Procedure Laterality Date    CARDIAC CATHETERIZATION N/A 2025    Procedure: Left Heart Cath, With LV;  Surgeon: Erik Morelos MD;  Location: [facility];  Service: Cardiovascular;  Laterality: N/A;    CT ANGIO CORONARY ART WITH HEARTFLOW IF SCORE >30%  2023    CT HEART CORONARY ANGIOGRAM [id number]    HERNIA REPAIR  2017    Hernia Repair    OTHER SURGICAL HISTORY  2018    Shoulder surgery    UPPER GASTROINTESTINAL ENDOSCOPY  2025     Family History   Problem Relation Name Age of Onset    Leukemia Father Onesimo Malave     Colon cancer Father Onesimo Malave     Colon polyps Father Onesimo Malave     Colon polyps Brother Renny Malave      Social History     Tobacco Use    Smoking status: Former     Types: Cigars     Quit date:      Years since quittin.2    Smokeless tobacco: Never   Substance Use Topics    Alcohol use: Not Currently    Drug use: Never       Physical Exam   ED Triage Vitals [25 2315]   Temperature Heart Rate Respirations BP   36.6 °C (97.9 °F) 63 18 172/86      Pulse Ox Temp Source Heart Rate Source Patient Position   97 % Temporal Monitor Sitting      BP  Location FiO2 (%)     Right arm --       Physical Exam  Constitutional:       General: He is in acute distress.      Appearance: Normal appearance. He is ill-appearing. He is not diaphoretic.   HENT:      Head: Normocephalic and atraumatic.      Nose: Nose normal.   Eyes:      Extraocular Movements: Extraocular movements intact.      Conjunctiva/sclera: Conjunctivae normal.      Pupils: Pupils are equal, round, and reactive to light.   Cardiovascular:      Rate and Rhythm: Normal rate and regular rhythm.   Pulmonary:      Effort: Pulmonary effort is normal. No respiratory distress.      Breath sounds: Normal breath sounds. No stridor. No wheezing.   Abdominal:      Tenderness: There is abdominal tenderness in the right upper quadrant and epigastric area. There is guarding. There is no right CVA tenderness or left CVA tenderness.   Musculoskeletal:         General: Normal range of motion.      Cervical back: Normal range of motion.   Skin:     General: Skin is warm and dry.   Neurological:      General: No focal deficit present.      Mental Status: He is alert and oriented to person, place, and time. Mental status is at baseline.   Psychiatric:         Mood and Affect: Mood normal.           ED Course & MDM   Diagnoses as of 04/02/25 0301   Biliary colic   Calculus of gallbladder without cholecystitis without obstruction                 No data recorded     [place] Coma Scale Score: 15 (04/01/25 2317 : Addie Khan RN)                           Medical Decision Making  A 69-year-old male patient comes in the emergency department today with complaints of right upper quadrant abdominal pain that started an hour ago.  States it is a persistent pain and rates it a 6 out of 10 on the pain scale currently states he got up to a 10 out of 10 on the pain scale.  States he feels nauseated with it but otherwise has no other complaints present time.  For this purpose he comes in the emergency department today for the  evaluation.    EKG, chest x-ray, laboratory studies ordered as well as CT of the abdomen pelvis.  Rule out ACS, arrhythmias, electrolyte abnormalities, leukocytosis, acute kidney injury, pneumonia, pneumothorax, pulmonary congestion or pleural effusions.  Rule out acute intra-abdominal surgical need including but not limited to acute cholecystitis, choledocholithiasis, pancreatitis, appendicitis, diverticulitis, colitis, gastroenteritis, bowel perforation or abscess.    Offered patient medication for his pain currently patient declines.  Ordered patient IV fluids as well as some IV Zofran.    Patient's troponin negative, negative COVID-19 and influenza.  BNP negative.  Urinalysis negative lipase negative, metabolic panel within normal limits, lactate negative no leukocytosis or left shift.  CT of the abdomen pelvis shows gallbladder that is distended with fluid and contains several radiopaque stones without evidence of wall thickening.  Patient also has some diverticula without evidence of diverticulitis.  I followed this up with a ultrasound study which shows cholelithiasis but no evidence of acute cholecystitis.  Patient does have a cystic lesion right kidney.    Believe patient is experiencing biliary colic based on clinical exam as well as findings.  Will have patient follow-up with general surgery.  Will provide medications for home.  Patient agrees with this plan expressed verbal understanding.  Questions were answered.    Historian is the patient    Diagnosis: Biliary colic, cholelithiasis      Labs Reviewed   CBC WITH AUTO DIFFERENTIAL - Abnormal       Result Value    WBC 6.9      nRBC 0.0      RBC 4.56      Hemoglobin 13.6      Hematocrit 40.4 (*)     MCV 89      MCH 29.8      MCHC 33.7      RDW 11.9      Platelets 179      Neutrophils % 67.3      Immature Granulocytes %, Automated 0.1      Lymphocytes % 20.7      Monocytes % 8.3      Eosinophils % 3.2      Basophils % 0.4      Neutrophils Absolute 4.60       Immature Granulocytes Absolute, Automated 0.01      Lymphocytes Absolute 1.42      Monocytes Absolute 0.57      Eosinophils Absolute 0.22      Basophils Absolute 0.03     COMPREHENSIVE METABOLIC PANEL - Abnormal    Glucose 104 (*)     Sodium 139      Potassium 3.6      Chloride 105      Bicarbonate 28      Anion Gap 10      Urea Nitrogen 14      Creatinine 0.85      eGFR >90      Calcium 9.0      Albumin 4.5      Alkaline Phosphatase 73      Total Protein 6.8      AST 19      Bilirubin, Total 0.8      ALT 24     LIPASE - Normal    Lipase 24      Narrative:     Venipuncture immediately after or during the administration of Metamizole may lead to falsely low results. Testing should be performed immediately prior to Metamizole dosing.   LACTATE - Normal    Lactate 0.8      Narrative:     Venipuncture immediately after or during the administration of Metamizole may lead to falsely low results. Testing should be performed immediately prior to Metamizole dosing.   B-TYPE NATRIURETIC PEPTIDE - Normal    BNP 24      Narrative:        <100 pg/mL - Heart failure unlikely  100-299 pg/mL - Intermediate probability of acute heart                  failure exacerbation. Correlate with clinical                  context and patient history.    >=300 pg/mL - Heart Failure likely. Correlate with clinical                  context and patient history.    BNP testing is performed using different testing methodology at [facility] than at other [facility]. Direct result comparisons should only be made within the same method.      SARS-COV-2 PCR - Normal    Coronavirus 2019, PCR Not Detected      Narrative:     This assay is an FDA-cleared, in vitro diagnostic nucleic acid amplification test for the qualitative detection and differentiation of SARS CoV-2 from nasopharyngeal specimens collected from individuals with signs and symptoms of respiratory tract infections, and has been validated for use at [facility]. Negative results do not preclude COVID-19 infections and should not be used as the sole basis for diagnosis, treatment, or other management decisions. Testing for SARS CoV-2 is recommended only for patients who meet current clinical and/or epidemiological criteria defined by federal, state, or local public health directives.   INFLUENZA A AND B PCR - Normal    Flu A Result Not Detected      Flu B Result Not Detected      Narrative:     This assay is an in vitro diagnostic multiplex nucleic acid amplification test for the detection and discrimination of Influenza A & B from nasopharyngeal specimens, and has been validated for use at [facility]. Negative results do not preclude Influenza A/B infections, and should not be used as the sole basis for diagnosis, treatment, or other management decisions. If Influenza A/B and RSV PCR results are negative, testing for Parainfluenza virus, Adenovirus and Metapneumovirus is routinely performed for [facility] and [facility] inpatients, and is available on other patients by placing an add-on request.   URINALYSIS WITH REFLEX CULTURE AND MICROSCOPIC - Normal    Color, Urine Light-Yellow      Appearance, Urine Clear      Specific Gravity, Urine 1.011      pH, Urine 6.0      Protein, Urine NEGATIVE      Glucose, Urine Normal      Blood, Urine NEGATIVE      Ketones, Urine NEGATIVE      Bilirubin, Urine NEGATIVE      Urobilinogen, Urine Normal      Nitrite, Urine NEGATIVE      Leukocyte Esterase, Urine NEGATIVE     SERIAL TROPONIN-INITIAL - Normal    Troponin I, High Sensitivity 7      Narrative:     Less than 99th percentile of normal range cutoff-  Female and children under 18 years old <14 ng/L; Male <21 ng/L: Negative  Repeat testing should be performed if clinically indicated.     Female and children under 18 years old 14-50 ng/L; Male 21-50 ng/L:  Consistent with possible cardiac damage and possible increased  clinical   risk. Serial measurements may help to assess extent of myocardial damage.     >50 ng/L: Consistent with cardiac damage, increased clinical risk and  myocardial infarction. Serial measurements may help assess extent of   myocardial damage.      NOTE: Children less than 1 year old may have higher baseline troponin   levels and results should be interpreted in conjunction with the overall   clinical context.     NOTE: Troponin I testing is performed using a different   testing methodology at [facility] than at other   [facility]. Direct result comparisons should only   be made within the same method.   SERIAL TROPONIN, 1 HOUR - Normal    Troponin I, High Sensitivity 6      Narrative:     Less than 99th percentile of normal range cutoff-  Female and children under 18 years old <14 ng/L; Male <21 ng/L: Negative  Repeat testing should be performed if clinically indicated.     Female and children under 18 years old 14-50 ng/L; Male 21-50 ng/L:  Consistent with possible cardiac damage and possible increased clinical   risk. Serial measurements may help to assess extent of myocardial damage.     >50 ng/L: Consistent with cardiac damage, increased clinical risk and  myocardial infarction. Serial measurements may help assess extent of   myocardial damage.      NOTE: Children less than 1 year old may have higher baseline troponin   levels and results should be interpreted in conjunction with the overall   clinical context.     NOTE: Troponin I testing is performed using a different   testing methodology at [facility] than at other   [facility]. Direct result comparisons should only   be made within the same method.   TROPONIN SERIES- (INITIAL, 1 HR)    Narrative:     The following orders were created for panel order Troponin I Series, High Sensitivity (0, 1 HR).  Procedure                               Abnormality         Status                     ---------                                -----------         ------                     Troponin I, High Sensiti...[[id number]]  Normal              Final result               Troponin, High Sensitivi...[[id number]]  Normal              Final result                 Please view results for these tests on the individual orders.   URINALYSIS WITH REFLEX CULTURE AND MICROSCOPIC    Narrative:     The following orders were created for panel order Urinalysis with Reflex Culture and Microscopic.  Procedure                               Abnormality         Status                     ---------                               -----------         ------                     Urinalysis with Reflex C...[[id number]]  Normal              Final result               Extra Urine Gray Tube[[id number]]                            In process                   Please view results for these tests on the individual orders.   EXTRA URINE GRAY TUBE        US gallbladder   Final Result   Cholelithiasis. No sonographic evidence of acute cholecystitis.        3.5 x 4.1 x 3.4 cm cystic lesion in the right kidney with thin septa   measuring up to 2 mm. Recommend repeat ultrasound in 6-12 months to   assess stability.        MACRO:   Critical Finding:  See findings. Notification was initiated on   4/2/2025 at 2:17 am by  Evan Finkelstein.  (**-YCF-**) Instructions:        Signed by: Evan Finkelstein 4/2/2025 2:17 AM   Dictation workstation:   [id number]      CT abdomen pelvis w IV contrast   Final Result   1. Gallbladder is distended with fluid and contains several   radiopaque stones, without evidence of wall thickening,   pericholecystic stranding or other signs of acute cholecystitis.   There is no evidence of biliary dilatation.   2. Scattered diverticula are present in the descending and sigmoid   colon without evidence of acute diverticulitis.   3. Moderate volume of solid stool is present in the transverse and   ascending colon. Correlate with any symptoms of constipation.         MACRO:   None.        Signed by: Chay Henriquez 4/2/2025 12:58 AM   Dictation workstation:   [id number]      XR chest 1 view   Final Result   1. No acute cardiopulmonary abnormality.             Signed by: Silvio Wiggins 4/2/2025 12:10 AM   Dictation workstation:   [id number]          Procedure  ECG 12 lead    Performed by: Kelvin Coburn PA-C  Authorized by: Kelvin Coburn PA-C    Interpretation:     Details:  My EKG interpretation  Rate:     ECG rate:  61    ECG rate assessment: normal    Rhythm:     Rhythm: sinus rhythm    ST segments:     ST segments:  Normal  T waves:     T waves: normal         Kelvin Coburn PA-C  04/02/25 0301

## 2025-04-03 LAB
ATRIAL RATE: 61 BPM
P AXIS: 60 DEGREES
P OFFSET: 147 MS
P ONSET: 112 MS
PR INTERVAL: 186 MS
Q ONSET: 205 MS
QRS COUNT: 10 BEATS
QRS DURATION: 104 MS
QT INTERVAL: 430 MS
QTC CALCULATION(BAZETT): 432 MS
QTC FREDERICIA: 432 MS
R AXIS: -30 DEGREES
T AXIS: 30 DEGREES
T OFFSET: 420 MS
VENTRICULAR RATE: 61 BPM

## 2025-04-07 ENCOUNTER — APPOINTMENT (OUTPATIENT)
Dept: NEUROLOGY | Facility: CLINIC | Age: 70
End: 2025-04-07
Payer: MEDICARE

## 2025-04-07 DIAGNOSIS — R07.89 ATYPICAL CHEST PAIN: ICD-10-CM

## 2025-04-07 RX ORDER — OMEPRAZOLE 40 MG/1
CAPSULE, DELAYED RELEASE ORAL
Qty: 30 CAPSULE | Refills: 2 | Status: SHIPPED | OUTPATIENT
Start: 2025-04-07

## 2025-04-14 ENCOUNTER — APPOINTMENT (OUTPATIENT)
Dept: SURGERY | Facility: CLINIC | Age: 70
End: 2025-04-14
Payer: MEDICARE

## 2025-04-14 VITALS
WEIGHT: 205 LBS | BODY MASS INDEX: 32.95 KG/M2 | DIASTOLIC BLOOD PRESSURE: 82 MMHG | SYSTOLIC BLOOD PRESSURE: 150 MMHG | HEIGHT: 66 IN

## 2025-04-14 DIAGNOSIS — K80.50 BILIARY COLIC: ICD-10-CM

## 2025-04-14 DIAGNOSIS — K80.20 CALCULUS OF GALLBLADDER WITHOUT CHOLECYSTITIS WITHOUT OBSTRUCTION: ICD-10-CM

## 2025-04-14 DIAGNOSIS — R10.31 RIGHT LOWER QUADRANT ABDOMINAL PAIN: Primary | ICD-10-CM

## 2025-04-14 PROCEDURE — 3077F SYST BP >= 140 MM HG: CPT | Performed by: SURGERY

## 2025-04-14 PROCEDURE — 3008F BODY MASS INDEX DOCD: CPT | Performed by: SURGERY

## 2025-04-14 PROCEDURE — 1123F ACP DISCUSS/DSCN MKR DOCD: CPT | Performed by: SURGERY

## 2025-04-14 PROCEDURE — 99204 OFFICE O/P NEW MOD 45 MIN: CPT | Performed by: SURGERY

## 2025-04-14 PROCEDURE — 1160F RVW MEDS BY RX/DR IN RCRD: CPT | Performed by: SURGERY

## 2025-04-14 PROCEDURE — 1159F MED LIST DOCD IN RCRD: CPT | Performed by: SURGERY

## 2025-04-14 PROCEDURE — 3079F DIAST BP 80-89 MM HG: CPT | Performed by: SURGERY

## 2025-04-14 NOTE — PATIENT INSTRUCTIONS
I will get CT scan to figure out the reason for the right lower quadrant pain; I will get HIDA scan to look at your gallbladder

## 2025-04-14 NOTE — PROGRESS NOTES
Subjective   Patient ID: Demetrio Malave is a 69 y.o. male who presents for Hospital Follow-up (Gallblader).  HPI  69-year-old male patient referred for RUQ/epigastric pain and gallstones. He underwent EGD with biopsies on 2/13/2025 by Dr. Moore. Per the report, erythema and gastric erosions noted. Pathology showed gastropathy. Colonoscopy March 11, 2021 by Dr. Astorga showed sigmoid colon diverticulosis, hemorrhoids and polyps with pathology showing tubular adenoma; plan to repeat colonoscopy in 2026.  He went to the ER on April 1, 2025 for epigastric pain radiating to the RUQ. He had similar episode 2 yrs ago with both episodes occurring around 9:30-10 pm with no association with meal but associated with nausea and no vomiting.  April 2, 2025 ultrasound showed gallstones and non-dilated CBD at 3 mm.  CT abdomen/pelvis showed distended gallbladder with gallstones, diverticulosis of the descending and sigmoid colon and moderate colonic stool burden.  Normal LFTs and CBC.  No fevers or chills. For the past 3 to 4 days, he has been having constant, 5 out of 10, sharp pain in the right lower quadrant worse with activities and when getting up from sitting. No relieving factor or radiation.  He is having 1-2 nonbloody formed bowel movements daily. Denies nausea, vomiting, fevers and chills. No previous history of pain like this. [facility] February 11, 2025 showed ejection fraction of 60 to 65%, trivial mitral and tricuspid regurgitation.  Cardiac cath January 13, 2025 showed mild stenosis of the LAD and RCA.        Objective   Physical Exam  Constitutional: no acute distress, well appearing and well nourished  Eyes: conjunctiva and lids with no erythema, swelling or discharge; EOMI  Ears, Nose, Mouth and Throat: external inspection of ears and nose are normal  Pulmonary: normal respiratory effort; clear to auscultation bilaterally, no wheezes or bronchi   Cardiovascular: regular rate and rhythm, no murmurs or extra-heart  sounds; pedal pulses are normal; no extremities edema or varicosities, no peripheral edema  Abdomen: soft, non-distended, moderate tenderness in RLQ, no peritoneal sign, no hernia  Musculoskeletal: digits and nails normal without clubbing or cyanosis; Joints, bones and muscles are normal with normal range of motion; muscle strength/tone is normal  Skin: normal without rashes or lesion  Neurologic: cranial nerve II-XII intact grossly; normal gait  Psychiatric: oriented to person, place and time    Assessment/Plan   69-year-old male patient with intermittent epigastric pain radiating to right upper quadrant (first episode 2 yrs ago and last episode April 1st 2025). Both episodes associated with nausea but no vomiting or association with meal. I reviewed the CT abdomen/pelvis, gallbladder ultrasound, CBC and CMP from the visit.  Normal LFTs and white count.  Gallstones without evidence of acute cholecystitis.  Of particular concern is the constant, sharp right lower quadrant pain that started 3 to 4 days ago.  He does have history of diverticulosis.  I will obtain CT abdomen/pelvis to rule out diverticulitis and other intra-abdominal processes.  Regarding the epigastric and right upper quadrant pain, will obtain HIDA with CCK to rule out cholecystitis.  If positive, he will undergo robotic cholecystectomy with cholangiogram.  Further recommendations after the scans are done         Crescencio Powers MD 04/14/25 11:19 AM

## 2025-04-17 ENCOUNTER — HOSPITAL ENCOUNTER (OUTPATIENT)
Dept: RADIOLOGY | Facility: HOSPITAL | Age: 70
Discharge: HOME | End: 2025-04-17
Payer: MEDICARE

## 2025-04-17 ENCOUNTER — TELEPHONE (OUTPATIENT)
Dept: SURGERY | Facility: CLINIC | Age: 70
End: 2025-04-17
Payer: MEDICARE

## 2025-04-17 DIAGNOSIS — R10.31 RIGHT LOWER QUADRANT ABDOMINAL PAIN: ICD-10-CM

## 2025-04-17 DIAGNOSIS — K80.20 CALCULUS OF GALLBLADDER WITHOUT CHOLECYSTITIS WITHOUT OBSTRUCTION: ICD-10-CM

## 2025-04-17 PROCEDURE — 74177 CT ABD & PELVIS W/CONTRAST: CPT

## 2025-04-17 PROCEDURE — 78227 HEPATOBIL SYST IMAGE W/DRUG: CPT

## 2025-04-17 PROCEDURE — A9537 TC99M MEBROFENIN: HCPCS | Performed by: SURGERY

## 2025-04-17 PROCEDURE — 2550000001 HC RX 255 CONTRASTS: Performed by: SURGERY

## 2025-04-17 PROCEDURE — 3430000001 HC RX 343 DIAGNOSTIC RADIOPHARMACEUTICALS: Performed by: SURGERY

## 2025-04-17 PROCEDURE — 2500000004 HC RX 250 GENERAL PHARMACY W/ HCPCS (ALT 636 FOR OP/ED): Mod: JZ | Performed by: SURGERY

## 2025-04-17 RX ORDER — SINCALIDE 5 UG/5ML
1.9 INJECTION, POWDER, LYOPHILIZED, FOR SOLUTION INTRAVENOUS ONCE
Status: COMPLETED | OUTPATIENT
Start: 2025-04-17 | End: 2025-04-17

## 2025-04-17 RX ORDER — KIT FOR THE PREPARATION OF TECHNETIUM TC 99M MEBROFENIN 45 MG/10ML
5.8 INJECTION, POWDER, LYOPHILIZED, FOR SOLUTION INTRAVENOUS
Status: COMPLETED | OUTPATIENT
Start: 2025-04-17 | End: 2025-04-17

## 2025-04-17 RX ADMIN — SINCALIDE 1.9 MCG: 5 INJECTION, POWDER, LYOPHILIZED, FOR SOLUTION INTRAVENOUS at 14:10

## 2025-04-17 RX ADMIN — KIT FOR THE PREPARATION OF TECHNETIUM TC 99M MEBROFENIN 5.8 MILLICURIE: 45 INJECTION, POWDER, LYOPHILIZED, FOR SOLUTION INTRAVENOUS at 12:54

## 2025-04-17 RX ADMIN — IOHEXOL 75 ML: 350 INJECTION, SOLUTION INTRAVENOUS at 15:16

## 2025-04-17 NOTE — TELEPHONE ENCOUNTER
I discussed with patient the HIDA scan which was normal.  I reviewed his CT abdomen/pelvis.  Per my read, moderate colonic stool burden especially on the right side.  Renal cyst.  Duodenal diverticulum.  We talked about incorporating fiber supplement in his diet.

## 2025-04-21 ENCOUNTER — APPOINTMENT (OUTPATIENT)
Dept: NEUROLOGY | Facility: CLINIC | Age: 70
End: 2025-04-21
Payer: MEDICARE

## 2025-04-23 ENCOUNTER — APPOINTMENT (OUTPATIENT)
Dept: CARDIOLOGY | Facility: CLINIC | Age: 70
End: 2025-04-23
Payer: MEDICARE

## 2025-04-30 ENCOUNTER — APPOINTMENT (OUTPATIENT)
Dept: CARDIOLOGY | Facility: CLINIC | Age: 70
End: 2025-04-30
Payer: MEDICARE

## 2025-05-02 ENCOUNTER — APPOINTMENT (OUTPATIENT)
Facility: CLINIC | Age: 70
End: 2025-05-02
Payer: MEDICARE

## 2025-05-02 VITALS
DIASTOLIC BLOOD PRESSURE: 76 MMHG | HEART RATE: 61 BPM | SYSTOLIC BLOOD PRESSURE: 135 MMHG | HEIGHT: 67 IN | TEMPERATURE: 98.1 F | WEIGHT: 209 LBS | OXYGEN SATURATION: 97 % | BODY MASS INDEX: 32.8 KG/M2

## 2025-05-02 DIAGNOSIS — G47.33 OSA (OBSTRUCTIVE SLEEP APNEA): Primary | ICD-10-CM

## 2025-05-02 DIAGNOSIS — E66.9 OBESITY (BMI 30-39.9): ICD-10-CM

## 2025-05-02 PROCEDURE — 99204 OFFICE O/P NEW MOD 45 MIN: CPT | Performed by: GENERAL PRACTICE

## 2025-05-02 PROCEDURE — G2211 COMPLEX E/M VISIT ADD ON: HCPCS | Performed by: GENERAL PRACTICE

## 2025-05-02 PROCEDURE — 1123F ACP DISCUSS/DSCN MKR DOCD: CPT | Performed by: GENERAL PRACTICE

## 2025-05-02 PROCEDURE — 3078F DIAST BP <80 MM HG: CPT | Performed by: GENERAL PRACTICE

## 2025-05-02 PROCEDURE — 1036F TOBACCO NON-USER: CPT | Performed by: GENERAL PRACTICE

## 2025-05-02 PROCEDURE — 1159F MED LIST DOCD IN RCRD: CPT | Performed by: GENERAL PRACTICE

## 2025-05-02 PROCEDURE — 1160F RVW MEDS BY RX/DR IN RCRD: CPT | Performed by: GENERAL PRACTICE

## 2025-05-02 PROCEDURE — 3075F SYST BP GE 130 - 139MM HG: CPT | Performed by: GENERAL PRACTICE

## 2025-05-02 PROCEDURE — 3008F BODY MASS INDEX DOCD: CPT | Performed by: GENERAL PRACTICE

## 2025-05-02 PROCEDURE — 1126F AMNT PAIN NOTED NONE PRSNT: CPT | Performed by: GENERAL PRACTICE

## 2025-05-02 ASSESSMENT — PAIN SCALES - GENERAL: PAINLEVEL_OUTOF10: 0-NO PAIN

## 2025-05-02 NOTE — PROGRESS NOTES
Patient: Demetrio Malave    [id number]  : 1955 -- AGE 69 y.o.    Provider: Dustin Hardy DO     Location [facility]   Service Date: 2025              [facility]  New Visit Note        HISTORY OF PRESENT ILLNESS     The patient's referring provider is: No ref. provider found    HISTORY OF PRESENT ILLNESS   Demetrio Malave is a 69 y.o. male who presents to a [facility] for a sleep medicine evaluation with concerns of Consult (Possible sleep apnea ).     The patient  has a past medical history of Colon polyp, Coronary artery disease, Diverticulosis, Hyperlipidemia, Hypertension, and Prostatitis..    PAST SLEEP HISTORY    Pmhx includes HTN, GERD, CAD, chronic rhinitis, former smoker.    Patient states that he had a sleep study done due to snoring and nocturia. He was found to have sleep apnea and was started on pap therapy. He used it for a few months and donated it during COVID. He struggled with getting tangled in the tubing and his mask was not comfortable.     CURRENT HISTORY    On today's visit, 2025, the patient reports that he is establishing care for his sleep apnea.     Sleep schedule  on weekdays / work days:  Usual Bedtime: 10pm  Sleep latency: few min  Wake time : 6am  Total sleep time average/day: 6 hours/day  Awakenings: 8x per night, nocturia, short.   Naps: sometimes dozes off while sitting in his chair.       Sleep schedule  on weekends/non work days :  Usual Bedtime:   11pm  Wake time : 6am    Sleep aids: no  Stimulants: no    Occupation: retired; used to do maintenance/ , used to be on call.   Now tries to bike and uses treadmill; also does work outside.     Preferred sleeping position: SLEEP POSITION: supine and sidelying    Sleep-related ROS:    Snoring: y   Witnessed apneas:  y      Gasping/ choking: n    Am Dry mouth:   y          Nasal congestion:   sometimes  am headaches: n    Sleep is  "described as refreshing .     Daytime sleepiness: y  Fatigue or decreased energy: y  Difficulty remembering things in daytime: sometimes  Difficulty staying focused in daytime: : n  Irritable during the day: n    Drowsy driving: n  Hx of car accident: n  Near-miss Car accident: n      RLS screen:  RLSSCREEN: - Sensations: Patient does not have unusual sensations in their extremities that cause an urge to move them     Sleep-related behaviors: DENIES    ESS: 3        REVIEW OF SYSTEMS     REVIEW OF SYSTEMS  Review of Systems   All other systems reviewed and are negative.          ALLERGIES AND MEDICATIONS     ALLERGIES  Allergies[1]    MEDICATIONS  Current Medications[2]      PAST HISTORY     PAST MEDICAL HISTORY  He  has a past medical history of Colon polyp, Coronary artery disease, Diverticulosis, Hyperlipidemia, Hypertension, and Prostatitis.    PAST SURGICAL HISTORY:  Surgical History[3]    FAMILY HISTORY  Family History[4]  DOES/DOES NOT EC: does have a family history of sleep disorder.      SOCIAL HISTORY  He  reports that he quit smoking about 45 years ago. His smoking use included cigars. He has never used smokeless tobacco. He reports that he does not currently use alcohol. He reports that he does not use drugs.     Caffeine consumption: 2 cups coffee in am.   Alcohol consumption: occasionally  Smoking: n  Marijuana: n  Other drugs: n      PHYSICAL EXAM     VITAL SIGNS: /76   Pulse 61   Temp 36.7 °C (98.1 °F) (Temporal)   Ht 1.689 m (5' 6.5\")   Wt 94.8 kg (209 lb)   SpO2 97%   BMI 33.23 kg/m²      PREVIOUS WEIGHTS:  Wt Readings from Last 3 Encounters:   05/02/25 94.8 kg (209 lb)   04/14/25 93 kg (205 lb)   04/01/25 92.1 kg (203 lb)       Physical Exam  Constitutional: Alert and oriented, cooperative, no obvious distress.   HENT: normocephalic.   Eyes: PERRLA, nonicteric   Neck: Supple, trachea midline   respiratory: CTA bilaterally, no wheezing/ crackles/ cough  Cardiac: no rub/ gallops  GI:BS " in all 4 quadrants, Soft, nontender, no masses  musculoskeletal/ Extremities: No clubbing  integumentary: no significant rashes observed.   Neurologic: AOx3.   psychiatric: appropriate mood and affect.  Modified Mallampati: 3        RESULTS/DATA         ASSESSMENT/PLAN     Mr. Malave is a 69 y.o. male and  has a past medical history of Colon polyp, Coronary artery disease, Diverticulosis, Hyperlipidemia, Hypertension, and Prostatitis. He was referred to the [facility] for evaluation of sleep apnea    Problem List Items Addressed This Visit    None      Problem List and Orders  Pmhx includes HTN, GERD, CAD, chronic rhinitis, former smoker.    1- BYRON  PSG 11/4/2019 --> mild BYRON, AHI 8.1, REM AHI 35.8,  SpO2 nayla 81%.      Reviewed and discussed the above sleep study results and management options in details. All questions answered, patient verbalizes understanding.     - orderred sleep study    -do not drive or operate heavy machinery if drowsy.  -avoid sleeping on your back.   -avoid sedating substances/ medication, alcohol, illicit drugs and tobacco.    2- Obesity  counseled on eating a healthy diet and exercising as tolerated.    Follow up after sleep study or sooner as needed.            [1] No Known Allergies  [2]   Current Outpatient Medications   Medication Sig Dispense Refill    amLODIPine (Norvasc) 5 mg tablet Take 1 tablet (5 mg) by mouth once daily. 90 tablet 3    aspirin 81 mg chewable tablet Chew 1 tablet (81 mg) once daily. 90 tablet 3    atorvastatin (Lipitor) 40 mg tablet Take 1 tablet (40 mg) by mouth once daily. 90 tablet 3    isosorbide mononitrate ER (Imdur) 30 mg 24 hr tablet Take 1 tablet (30 mg) by mouth once every 24 hours. 90 tablet 3    mometasone (Elocon) 0.1 % ointment Apply topically once daily. 45 g 3    nitroglycerin (Nitrostat) 0.4 mg SL tablet Place 1 tablet (0.4 mg) under the tongue every 5 minutes if needed for chest pain. as directed 30 tablet 2     omeprazole (PriLOSEC) 40 mg DR capsule TAKE ONE CAPSULE (40 MG) BY MOUTH ONCE DAILY. DO NOT CRUSH OR CHEW. 30 capsule 2     No current facility-administered medications for this visit.   [3]   Past Surgical History:  Procedure Laterality Date    CARDIAC CATHETERIZATION N/A 01/13/2025    Procedure: Left Heart Cath, With LV;  Surgeon: Erik Morelos MD;  Location: [facility];  Service: Cardiovascular;  Laterality: N/A;    CT ANGIO CORONARY ART WITH HEARTFLOW IF SCORE >30%  02/22/2023    CT HEART CORONARY ANGIOGRAM [facility] [id number] CT    HERNIA REPAIR  02/16/2017    Hernia Repair    OTHER SURGICAL HISTORY  12/17/2018    Shoulder surgery    UPPER GASTROINTESTINAL ENDOSCOPY  02/13/2025   [4]   Family History  Problem Relation Name Age of Onset    Leukemia Father Onesimo Malave     Colon cancer Father Onesimo Malave     Colon polyps Father Onesimo Malave     Colon polyps Brother Renny Malave

## 2025-05-08 ENCOUNTER — OFFICE VISIT (OUTPATIENT)
Dept: GASTROENTEROLOGY | Facility: CLINIC | Age: 70
End: 2025-05-08
Payer: MEDICARE

## 2025-05-08 DIAGNOSIS — K59.00 CONSTIPATION, UNSPECIFIED CONSTIPATION TYPE: ICD-10-CM

## 2025-05-08 DIAGNOSIS — R10.9 ABDOMINAL PAIN, UNSPECIFIED ABDOMINAL LOCATION: Primary | ICD-10-CM

## 2025-05-08 PROCEDURE — 99213 OFFICE O/P EST LOW 20 MIN: CPT | Performed by: STUDENT IN AN ORGANIZED HEALTH CARE EDUCATION/TRAINING PROGRAM

## 2025-05-08 PROCEDURE — G2211 COMPLEX E/M VISIT ADD ON: HCPCS | Performed by: STUDENT IN AN ORGANIZED HEALTH CARE EDUCATION/TRAINING PROGRAM

## 2025-05-08 NOTE — PROGRESS NOTES
"CC: Abdominal pain, constipation.     History of Present Illness:   Demetrio Malave is a 69 y.o. male with a PMH of CAD, HTN, HLD, obesity, colon polyps, GERD, BPH, skin cancer, BYRON, headaches, former smoker who presents to clinic for abdominal pain and constipation. He has been in the ED twice. Has multiple CT scans and HIDA scan. He was told it was likely constipation. Patient states he has been trying fiber supplements, but he is not sure it is helping with constipation. Has a constant pain in the R mid abdomen. No other concerns.     CT A/P 4/2025: No acute pathology, increased stool burden per my review.   HIDA scan 4/2025: WNL.   CT A/P 4/2025: gallstones/fluid, constipation.   EGD 2/13/2025: Gastropathy. No infection.    CT A/P without IV contrast 10/2024: Right renal cyst, diverticulosis, cholelithiasis, right lung nodule.  CT A/P without IV contrast 2/2024: 2 mm nonobstructing renal stone, cholelithiasis, diverticulosis.  CT A/P with IV contrast 4/2023: Fecal like material in the small bowel suggestive of delayed intestinal transit, diverticulosis, right renal cyst.  Colonoscopy 3/2021: 2 polyps (TAs), diverticulosis, hemorrhoids.  Has had other colonoscopies with polyps.    GI visit 2/2025: \"presents to clinic for atypical chest pain, epigastric abdominal pain. Patient with recent EGD, results below. Patient is on Prilosec 40 mg daily. He believes it may be giving him some lower abdominal pain. The epigastric abdominal pain is improved. Still with occasional chest pain, but it is improving. Low HR seems to exacerbate the chest pain. No other concerns. No NSAIDs. Takes ASA.\"    GI visit 2/10/2025: \"Occasional abdominal pain. Bowels are normal. The epigastric/chest pain is intermittent and can be severe. No dysphagia. Weight is decreasing on purpose. On ASA.\"    Review of Systems  ROS Negative unless otherwise stated above.    Past Medical/Surgical History  Past Medical History:   Diagnosis Date    Colon " polyp     Coronary artery disease     Diverticulosis     Hyperlipidemia     Hypertension     Prostatitis       Past Surgical History:   Procedure Laterality Date    CARDIAC CATHETERIZATION N/A 01/13/2025    Procedure: Left Heart Cath, With LV;  Surgeon: Erik Mroelos MD;  Location: [facility];  Service: Cardiovascular;  Laterality: N/A;    CT ANGIO CORONARY ART WITH HEARTFLOW IF SCORE >30%  02/22/2023    CT HEART CORONARY ANGIOGRAM [facility] [id number] CT    HERNIA REPAIR  02/16/2017    Hernia Repair    OTHER SURGICAL HISTORY  12/17/2018    Shoulder surgery    UPPER GASTROINTESTINAL ENDOSCOPY  02/13/2025        Social History   reports that he quit smoking about 45 years ago. His smoking use included cigars. He has never used smokeless tobacco. He reports that he does not currently use alcohol. He reports that he does not use drugs.     Family History  family history includes Colon cancer in his father; Colon polyps in his brother and father; Leukemia in his father.     Allergies  No Known Allergies    Medications  Current Outpatient Medications   Medication Instructions    amLODIPine (NORVASC) 5 mg, oral, Daily    aspirin 81 mg, oral, Daily    atorvastatin (LIPITOR) 40 mg, oral, Daily    isosorbide mononitrate ER (IMDUR) 30 mg, oral, Every 24 hours    mometasone (Elocon) 0.1 % ointment Topical, Daily    nitroglycerin (NITROSTAT) 0.4 mg, sublingual, Every 5 min PRN, as directed    omeprazole (PriLOSEC) 40 mg DR capsule TAKE ONE CAPSULE (40 MG) BY MOUTH ONCE DAILY. DO NOT CRUSH OR CHEW.        Objective   General: A&Ox3, NAD.  HEENT: AT/NC.   Skin: No Jaundice.   Neuro: No focal deficits.   Psych: Normal mood and affect.     Lab Results   Component Value Date    WBC 6.9 04/01/2025    HGB 13.6 04/01/2025    HCT 40.4 (L) 04/01/2025    MCV 89 04/01/2025     04/01/2025       Chemistry    Lab Results   Component Value Date/Time     04/01/2025 2334     01/27/2025 0715    K 3.6 04/01/2025 2334    K  4.2 01/27/2025 0715     04/01/2025 2334     01/27/2025 0715    CO2 28 04/01/2025 2334    CO2 28 01/27/2025 0715    BUN 14 04/01/2025 2334    BUN 15 01/27/2025 0715    CREATININE 0.85 04/01/2025 2334    CREATININE 0.88 01/27/2025 0715    Lab Results   Component Value Date/Time    CALCIUM 9.0 04/01/2025 2334    CALCIUM 9.2 01/27/2025 0715    ALKPHOS 73 04/01/2025 2334    ALKPHOS 74 01/27/2025 0715    AST 19 04/01/2025 2334    AST 23 01/27/2025 0715    ALT 24 04/01/2025 2334    ALT 28 01/27/2025 0715    BILITOT 0.8 04/01/2025 2334    BILITOT 1.6 (H) 01/27/2025 0715           ASSESSMENT/PLAN  Demetrio Malave is a 69 y.o. male with a PMH of CAD, HTN, HLD, obesity, colon polyps, GERD, BPH, skin cancer, BYRON, headaches, former smoker who presents to clinic for abdominal pain and constipation.     CT A/P 4/2025: No acute pathology, increased stool burden per my review.   HIDA scan 4/2025: WNL.   CT A/P 4/2025: gallstones/fluid, constipation.   EGD 2/13/2025: Gastropathy. No infection.    CT A/P without IV contrast 10/2024: Right renal cyst, diverticulosis, cholelithiasis, right lung nodule.  CT A/P without IV contrast 2/2024: 2 mm nonobstructing renal stone, cholelithiasis, diverticulosis.  CT A/P with IV contrast 4/2023: Fecal like material in the small bowel suggestive of delayed intestinal transit, diverticulosis, right renal cyst.  Colonoscopy 3/2021: 2 polyps (TAs), diverticulosis, hemorrhoids.  Has had other colonoscopies with polyps.    -Consider 1/2 bowel prep.   -Start Miralax BID and titrate as needed.  -Increase water intake.  -Continue Prilosec 40 mg daily.  -Antireflux lifestyle.   -Surveillance colonoscopy in 2026.    Total video visit time: 6 minutes.  Follow up in 6 months.    Oswaldo Moore DO

## 2025-05-09 ENCOUNTER — CLINICAL SUPPORT (OUTPATIENT)
Dept: SLEEP MEDICINE | Facility: HOSPITAL | Age: 70
End: 2025-05-09
Payer: MEDICARE

## 2025-05-09 DIAGNOSIS — G47.33 OSA (OBSTRUCTIVE SLEEP APNEA): ICD-10-CM

## 2025-05-09 NOTE — PROGRESS NOTES
Type of Study: HOME SLEEP STUDY - NOMAD     The patient received equipment and instructions for use of the [facility] Nomad HSAT device [id number]. The patient was instructed how to apply the effort belts, cannula, thermistor. It was also explained how the Nomad and oximeter components work.  The patient was asked to record their sleep for an 8-hour period.     The patient was informed of their responsibility for the device and acknowledged this by signing the HSAT device contract. The patient was asked to return the device on 5/10/2025 by the hour of 12:00pm to the Sleep Center.     The patient was instructed to call 911 as usual for any medical- emergencies while at home.  The patient was also given a phone number for troubleshooting when using the device in case there were additional questions.

## 2025-05-20 ENCOUNTER — PATIENT MESSAGE (OUTPATIENT)
Facility: CLINIC | Age: 70
End: 2025-05-20
Payer: MEDICARE

## 2025-06-05 ENCOUNTER — APPOINTMENT (OUTPATIENT)
Facility: CLINIC | Age: 70
End: 2025-06-05
Payer: MEDICARE

## 2025-06-11 ENCOUNTER — OFFICE VISIT (OUTPATIENT)
Facility: CLINIC | Age: 70
End: 2025-06-11
Payer: MEDICARE

## 2025-06-11 VITALS — BODY MASS INDEX: 32.95 KG/M2 | WEIGHT: 205 LBS | HEIGHT: 66 IN

## 2025-06-11 DIAGNOSIS — G47.33 OSA (OBSTRUCTIVE SLEEP APNEA): Primary | ICD-10-CM

## 2025-06-11 DIAGNOSIS — E66.9 OBESITY (BMI 30-39.9): ICD-10-CM

## 2025-06-11 PROCEDURE — 3008F BODY MASS INDEX DOCD: CPT | Performed by: GENERAL PRACTICE

## 2025-06-11 PROCEDURE — 1159F MED LIST DOCD IN RCRD: CPT | Performed by: GENERAL PRACTICE

## 2025-06-11 PROCEDURE — 99214 OFFICE O/P EST MOD 30 MIN: CPT | Performed by: GENERAL PRACTICE

## 2025-06-11 PROCEDURE — 1036F TOBACCO NON-USER: CPT | Performed by: GENERAL PRACTICE

## 2025-06-11 ASSESSMENT — COLUMBIA-SUICIDE SEVERITY RATING SCALE - C-SSRS
1. IN THE PAST MONTH, HAVE YOU WISHED YOU WERE DEAD OR WISHED YOU COULD GO TO SLEEP AND NOT WAKE UP?: NO
6. HAVE YOU EVER DONE ANYTHING, STARTED TO DO ANYTHING, OR PREPARED TO DO ANYTHING TO END YOUR LIFE?: NO
2. HAVE YOU ACTUALLY HAD ANY THOUGHTS OF KILLING YOURSELF?: NO

## 2025-06-11 ASSESSMENT — SLEEP AND FATIGUE QUESTIONNAIRES
HOW LIKELY ARE YOU TO NOD OFF OR FALL ASLEEP WHEN YOU ARE A PASSENGER IN A CAR FOR AN HOUR WITHOUT A BREAK: WOULD NEVER DOZE
SITING INACTIVE IN A PUBLIC PLACE LIKE A CLASS ROOM OR A MOVIE THEATER: WOULD NEVER DOZE
HOW LIKELY ARE YOU TO NOD OFF OR FALL ASLEEP WHILE WATCHING TV: SLIGHT CHANCE OF DOZING
HOW LIKELY ARE YOU TO NOD OFF OR FALL ASLEEP IN A CAR, WHILE STOPPED FOR A FEW MINUTES IN TRAFFIC: WOULD NEVER DOZE
HOW LIKELY ARE YOU TO NOD OFF OR FALL ASLEEP WHILE SITTING AND TALKING TO SOMEONE: WOULD NEVER DOZE
HOW LIKELY ARE YOU TO NOD OFF OR FALL ASLEEP WHILE LYING DOWN TO REST IN THE AFTERNOON WHEN CIRCUMSTANCES PERMIT: WOULD NEVER DOZE
HOW LIKELY ARE YOU TO NOD OFF OR FALL ASLEEP WHILE SITTING AND READING: WOULD NEVER DOZE
HOW LIKELY ARE YOU TO NOD OFF OR FALL ASLEEP WHILE SITTING QUIETLY AFTER LUNCH WITHOUT ALCOHOL: WOULD NEVER DOZE
ESS-CHAD TOTAL SCORE: 1

## 2025-06-11 ASSESSMENT — PATIENT HEALTH QUESTIONNAIRE - PHQ9
SUM OF ALL RESPONSES TO PHQ9 QUESTIONS 1 AND 2: 0
2. FEELING DOWN, DEPRESSED OR HOPELESS: NOT AT ALL
1. LITTLE INTEREST OR PLEASURE IN DOING THINGS: NOT AT ALL

## 2025-06-11 NOTE — PROGRESS NOTES
Patient: Demetrio Malave    [id number]  : 1955 -- AGE 69 y.o.    Provider: Dustin Hardy DO     Location [facility]   Service Date: 2025              [facility]  Follow up Visit Note      Virtual or Telephone Consent  An interactive audio and video telecommunication system which permits real time communications between the patient (at the originating site) and provider (at the distant site) was utilized to provide this telehealth service.     Verbal consent was requested and obtained from Demetrio Malave on this date, 25 for a telehealth visit and the patient's location was confirmed at the time of the visit.    HISTORY OF PRESENT ILLNESS     The patient's referring provider is: No ref. provider found    HISTORY OF PRESENT ILLNESS   Demetrio Malave is a 69 y.o. male who presents to a [facility] for a sleep medicine evaluation with concerns of Sleep Apnea.     The patient  has a past medical history of Colon polyp, Coronary artery disease, Diverticulosis, Hyperlipidemia, Hypertension, and Prostatitis..    PAST SLEEP HISTORY    Pmhx includes HTN, GERD, CAD, chronic rhinitis, former smoker.    Patient states that he had a sleep study done due to snoring and nocturia. He was found to have sleep apnea and was started on pap therapy. He used it for a few months and donated it during COVID. He struggled with getting tangled in the tubing and his mask was not comfortable.     CURRENT HISTORY    25  the patient reports that he is establishing care for his sleep apnea.     25   Patient had a sleep study and would like to discuss his results.    Sleep schedule  on weekdays / work days:  Usual Bedtime: 11pm  Sleep latency: few min  Wake time : 6-7am  Total sleep time average/day: 6.5 hours/day  Awakenings: 1-6x per night, nocturia, short.   Naps: sometimes dozes off while sitting in his chair.     Sleep schedule  on  "weekends/non work days :  Usual Bedtime:   11pm  Wake time : 6am    Sleep aids: no  Stimulants: no    Occupation: retired; used to do maintenance/ , used to be on call.   Now tries to bike and uses treadmill; also does work outside.     Preferred sleeping position: SLEEP POSITION: supine and sidelying    Sleep-related ROS:    Snoring: y   Witnessed apneas:  y   but unsure   Gasping/ choking: n    Am Dry mouth:   y          Nasal congestion:   sometimes  am headaches: n    Sleep is described as refreshing .     Daytime sleepiness: sometimes  Fatigue or decreased energy: sometimes      Drowsy driving: n  Hx of car accident: n  Near-miss Car accident: n      RLS screen:  RLSSCREEN: - Sensations: Patient does not have unusual sensations in their extremities that cause an urge to move them     Sleep-related behaviors: DENIES    ESS: 1          REVIEW OF SYSTEMS     REVIEW OF SYSTEMS  Review of Systems   All other systems reviewed and are negative.          ALLERGIES AND MEDICATIONS     ALLERGIES  Allergies[1]    MEDICATIONS  Current Medications[2]      PAST HISTORY     PAST MEDICAL HISTORY  He  has a past medical history of Colon polyp, Coronary artery disease, Diverticulosis, Hyperlipidemia, Hypertension, and Prostatitis.    PAST SURGICAL HISTORY:  Surgical History[3]    FAMILY HISTORY  Family History[4]  DOES/DOES NOT EC: does have a family history of sleep disorder.      SOCIAL HISTORY  He  reports that he quit smoking about 45 years ago. His smoking use included cigars and cigarettes. He has never used smokeless tobacco. He reports that he does not currently use alcohol. He reports that he does not use drugs.     Caffeine consumption: 2 cups coffee in am.   Alcohol consumption: occasionally  Smoking: n  Marijuana: n  Other drugs: n      PHYSICAL EXAM     VITAL SIGNS: Ht 1.676 m (5' 6\")   Wt 93 kg (205 lb)   BMI 33.09 kg/m²      PREVIOUS WEIGHTS:  Wt Readings from Last 3 Encounters:   06/11/25 93 kg " (205 lb)   05/02/25 94.8 kg (209 lb)   04/14/25 93 kg (205 lb)       Physical Exam  Constitutional: Alert and oriented, cooperative, no obvious distress.   HENT: normocephalic.   Neurologic: AOx3.   psychiatric: appropriate mood and affect.        RESULTS/DATA         ASSESSMENT/PLAN     Mr. Malave is a 69 y.o. male and  has a past medical history of Colon polyp, Coronary artery disease, Diverticulosis, Hyperlipidemia, Hypertension, and Prostatitis. He was referred to the [facility] for evaluation of sleep apnea    Problem List Items Addressed This Visit    None      Problem List and Orders  Pmhx includes HTN, GERD, CAD, chronic rhinitis, former smoker.    1- BYRON  PSG 11/4/2019 --> mild BYRON, AHI 8.1, REM AHI 35.8,  SpO2 nayla 81%.    HST 5/9/2025--> mild BYRON, AURELIANO 3% 13.5% 9.2, MELA 0, SpO2 nayla 78%.    Reviewed and discussed the above sleep study results and management options in details. All questions answered, patient verbalizes understanding.     -Patient will try positional therapy  Patient is currently not interested in PAP therapy or obtaining an oral appliance or speech therapy, he will let us know if he decides to pursue any of these options.    -do not drive or operate heavy machinery if drowsy.  -avoid sleeping on your back.   -avoid sedating substances/ medication, alcohol, illicit drugs and tobacco.    2- Obesity  counseled on eating a healthy diet and exercising as tolerated.  He joined weight watchers    Follow up as needed.            [1] No Known Allergies  [2]   Current Outpatient Medications   Medication Sig Dispense Refill    amLODIPine (Norvasc) 5 mg tablet Take 1 tablet (5 mg) by mouth once daily. 90 tablet 3    aspirin 81 mg chewable tablet Chew 1 tablet (81 mg) once daily. 90 tablet 3    atorvastatin (Lipitor) 40 mg tablet Take 1 tablet (40 mg) by mouth once daily. 90 tablet 3    isosorbide mononitrate ER (Imdur) 30 mg 24 hr tablet Take 1 tablet (30 mg) by mouth  once every 24 hours. 90 tablet 3    mometasone (Elocon) 0.1 % ointment Apply topically once daily. 45 g 3    nitroglycerin (Nitrostat) 0.4 mg SL tablet Place 1 tablet (0.4 mg) under the tongue every 5 minutes if needed for chest pain. as directed 30 tablet 2    omeprazole (PriLOSEC) 40 mg DR capsule TAKE ONE CAPSULE (40 MG) BY MOUTH ONCE DAILY. DO NOT CRUSH OR CHEW. 30 capsule 2     No current facility-administered medications for this visit.   [3]   Past Surgical History:  Procedure Laterality Date    CARDIAC CATHETERIZATION N/A 01/13/2025    Procedure: Left Heart Cath, With LV;  Surgeon: Erik Morelos MD;  Location: [facility];  Service: Cardiovascular;  Laterality: N/A;    CT ANGIO CORONARY ART WITH HEARTFLOW IF SCORE >30%  02/22/2023    CT HEART CORONARY ANGIOGRAM [facility] [id number] CT    HERNIA REPAIR  02/16/2017    Hernia Repair    OTHER SURGICAL HISTORY  12/17/2018    Shoulder surgery    UPPER GASTROINTESTINAL ENDOSCOPY  02/13/2025   [4]   Family History  Problem Relation Name Age of Onset    Diabetes Mother Jessica Ananda     Hearing loss Mother Jessica Roundup     Heart disease Mother Jessica Malave     Leukemia Father Onesimo Ananda     Colon cancer Father Onesimo Ananda     Colon polyps Father Onesimo Roundup     Cancer Father Onesimo Ananda     Diabetes Father Onesimo Roundup     Heart disease Father Onesimo Ananda     Heart attack Father Onesimo Roundup     Colon polyps Brother Renny Riveray     Cancer Brother Renny Riveray     Diabetes Brother Renny Ananda     Heart disease Brother Renny Ananda     Hypertension Brother Renny Malave     COPD Brother Aravind Roundup     Hearing loss Brother Aravind Ananda     Hypertension Brother Aravind Ananda     Diabetes Sister Susan Woodyard     Hypertension Sister Susan Woodyard     Diabetes Sister Mary Jo Roundup     Hypertension Sister Mary Jo Ananda     Heart attack Sister Mary Jo Ananda     Hearing loss Sister Ольга Prybis     Hypertension Sister Ольга Prybis      Diabetes Brother Onesimo Malave jr     Hypertension Brother Onesimo Malave jr     Intellectual Disability Brother Iker Malave

## 2025-07-28 ENCOUNTER — PATIENT MESSAGE (OUTPATIENT)
Dept: GASTROENTEROLOGY | Facility: CLINIC | Age: 70
End: 2025-07-28
Payer: MEDICARE

## 2025-07-28 DIAGNOSIS — R07.89 ATYPICAL CHEST PAIN: ICD-10-CM

## 2025-07-28 RX ORDER — OMEPRAZOLE 40 MG/1
CAPSULE, DELAYED RELEASE ORAL
Qty: 30 CAPSULE | Refills: 2 | OUTPATIENT
Start: 2025-07-28

## 2025-09-02 LAB — PSA SERPL-MCNC: 0.84 NG/ML

## 2025-09-03 ENCOUNTER — HOSPITAL ENCOUNTER (OUTPATIENT)
Dept: RADIOLOGY | Facility: HOSPITAL | Age: 70
Discharge: HOME | End: 2025-09-03
Payer: MEDICARE

## 2025-09-03 DIAGNOSIS — N20.1 URETERAL CALCULUS: ICD-10-CM

## 2025-09-03 DIAGNOSIS — N28.1 RENAL CYST: ICD-10-CM

## 2025-09-03 PROCEDURE — 74176 CT ABD & PELVIS W/O CONTRAST: CPT

## 2025-09-03 PROCEDURE — 74176 CT ABD & PELVIS W/O CONTRAST: CPT | Performed by: STUDENT IN AN ORGANIZED HEALTH CARE EDUCATION/TRAINING PROGRAM

## 2025-09-04 ENCOUNTER — APPOINTMENT (OUTPATIENT)
Facility: CLINIC | Age: 70
End: 2025-09-04
Payer: MEDICARE

## 2025-09-05 ENCOUNTER — TELEPHONE (OUTPATIENT)
Dept: GASTROENTEROLOGY | Facility: CLINIC | Age: 70
End: 2025-09-05
Payer: MEDICARE

## 2025-09-08 ENCOUNTER — APPOINTMENT (OUTPATIENT)
Dept: UROLOGY | Facility: CLINIC | Age: 70
End: 2025-09-08
Payer: MEDICARE

## 2025-09-09 ENCOUNTER — APPOINTMENT (OUTPATIENT)
Dept: UROLOGY | Facility: CLINIC | Age: 70
End: 2025-09-09
Payer: MEDICARE

## 2025-10-31 ENCOUNTER — APPOINTMENT (OUTPATIENT)
Dept: PRIMARY CARE | Facility: CLINIC | Age: 70
End: 2025-10-31
Payer: MEDICARE

## 2025-11-10 ENCOUNTER — APPOINTMENT (OUTPATIENT)
Dept: PRIMARY CARE | Facility: CLINIC | Age: 70
End: 2025-11-10
Payer: MEDICARE

## 2025-11-17 ENCOUNTER — APPOINTMENT (OUTPATIENT)
Dept: UROLOGY | Facility: CLINIC | Age: 70
End: 2025-11-17
Payer: MEDICARE

## 2026-03-30 ENCOUNTER — APPOINTMENT (OUTPATIENT)
Dept: CARDIOLOGY | Facility: CLINIC | Age: 71
End: 2026-03-30
Payer: MEDICARE

## (undated) DEVICE — TUBING, MANIFOLD, LOW PRESSURE

## (undated) DEVICE — SHEATH, GLIDESHEATH, SLENDER, 6FR 10CM

## (undated) DEVICE — CATHETER, OPTITORQUE, 5FR, JACKY, 3.5/ 2H/110CM, CURVED

## (undated) DEVICE — BAND, VASCULAR, RADIAL HEMOSTAT, EXTRA-LONG 29CM